# Patient Record
Sex: FEMALE | Race: BLACK OR AFRICAN AMERICAN | Employment: UNEMPLOYED | URBAN - METROPOLITAN AREA
[De-identification: names, ages, dates, MRNs, and addresses within clinical notes are randomized per-mention and may not be internally consistent; named-entity substitution may affect disease eponyms.]

---

## 2017-01-01 ENCOUNTER — ED HISTORICAL/CONVERTED ENCOUNTER (OUTPATIENT)
Dept: OTHER | Age: 32
End: 2017-01-01

## 2017-02-02 ENCOUNTER — HOSPITAL ENCOUNTER (EMERGENCY)
Age: 32
Discharge: HOME | End: 2017-02-02
Attending: EMERGENCY MEDICINE
Payer: MEDICAID

## 2017-02-02 VITALS
BODY MASS INDEX: 25.76 KG/M2 | TEMPERATURE: 98.7 F | DIASTOLIC BLOOD PRESSURE: 78 MMHG | HEIGHT: 68 IN | RESPIRATION RATE: 12 BRPM | OXYGEN SATURATION: 100 % | SYSTOLIC BLOOD PRESSURE: 127 MMHG | HEART RATE: 86 BPM | WEIGHT: 170 LBS

## 2017-02-02 DIAGNOSIS — S39.012A BACK STRAIN, INITIAL ENCOUNTER: Primary | ICD-10-CM

## 2017-02-02 PROCEDURE — 99282 EMERGENCY DEPT VISIT SF MDM: CPT

## 2017-02-02 RX ORDER — METHOCARBAMOL 500 MG/1
500 TABLET, FILM COATED ORAL 4 TIMES DAILY
Qty: 30 TAB | Refills: 0 | Status: SHIPPED | OUTPATIENT
Start: 2017-02-02 | End: 2017-06-25

## 2017-02-02 RX ORDER — TRAMADOL HYDROCHLORIDE AND ACETAMINOPHEN 37.5; 325 MG/1; MG/1
1 TABLET ORAL
Qty: 12 TAB | Refills: 0 | Status: SHIPPED | OUTPATIENT
Start: 2017-02-02 | End: 2017-06-25

## 2017-02-02 NOTE — ED NOTES
Patient given copy of discharge instructions and 2 script(s). Patient given a current medication reconciliation form and verbalized understanding of their medications and importance of discussing medications at follow-up. Patient stable at discharge. Ambulatory out of ED with self.

## 2017-02-02 NOTE — ED NOTES
Pt reports mid, lower back pain that began yesterday while pt was cleaning a bathroom, denies any urinary symptoms; denies any numbness or tingling down legs      Emergency Department Nursing Plan of Care       The Nursing Plan of Care is developed from the Nursing assessment and Emergency Department Attending provider initial evaluation. The plan of care may be reviewed in the ED Provider note.     The Plan of Care was developed with the following considerations:   Patient / Family readiness to learn indicated by:verbalized understanding  Persons(s) to be included in education: patient  Barriers to Learning/Limitations:No    Signed     Rupert Early RN    2/2/2017   2:59 PM

## 2017-02-02 NOTE — LETTER
[address] 
[facility] EMERGENCY DEPT 
[address] [place] [address] [address] [id number] [phone] Work/School Note Date: 2/2/2017 To Whom It May concern: 
 
Beka Camacho was seen and treated today in the emergency room by the following provider(s): 
Attending Provider: Radha Pascal MD 
Physician Assistant: Leander Pozo PA-C. Beka Camacho may return to work on 2/4/2017. Sincerely, Leander Pozo PA-C

## 2017-02-02 NOTE — ED PROVIDER NOTES
Patient is a 28 y.o. female presenting with back pain. Back Pain    This is a new (Pt reports bending over to scrub a ub at work yesterday and when she stood back up she had low back pain. Pain has continued today at work. Denies fever, chills, abd pain, n/v, urinary sxs.) problem. The current episode started yesterday. The problem has not changed since onset. The problem occurs constantly. Patient reports work related injury. The pain is associated with no known injury. The pain is present in the lower back. The quality of the pain is described as aching. The pain does not radiate. The pain is at a severity of 8/10. The symptoms are aggravated by bending, certain positions and twisting. The pain is worse during the day. Pertinent negatives include no chest pain, no fever, no numbness, no weight loss, no headaches, no abdominal pain, no abdominal swelling, no bowel incontinence, no perianal numbness, no bladder incontinence, no dysuria, no pelvic pain, no leg pain, no paresthesias, no paresis, no tingling and no weakness. Treatments tried: tylenol. The treatment provided mild relief.         Past Medical History:   Diagnosis Date    Asthma     Delivery normal     Depression     Ectopic pregnancy     Miscarriage     Other ill-defined conditions(199.89)      ectopic pregnancy    Psychiatric disorder      anger        Past Surgical History:   Procedure Laterality Date    Pr abdomen surgery proc unlisted      Hx cholecystectomy      Hx orthopaedic       Right arm/leg    Hx gyn       right oophetrecomy    Hx hernia repair  12/5/14     Open repair right inguinal hernia with mesh         Family History:   Problem Relation Age of Onset    Cancer Mother      breast    Hypertension Mother     Thyroid Disease Mother     Sickle Cell Trait Father     Asthma Sister     Asthma Brother     Asthma Sister     Asthma Sister     Asthma Sister     Asthma Sister     Asthma Sister     Asthma Brother     Asthma Brother     Asthma Brother     Asthma Brother     Sickle Cell Anemia Brother     Asthma Brother     Diabetes Paternal Grandmother        Social History     Social History    Marital status:      Spouse name: N/A    Number of children: N/A    Years of education: N/A     Occupational History    Not on file. Social History Main Topics    Smoking status: Former Smoker     Packs/day: 0.50     Types: Cigarettes     Quit date: 04/2016    Smokeless tobacco: Never Used      Comment: social smoker    Alcohol use Yes      Comment: occasionally    Drug use: No      Comment: last used March 1, 2015    Sexual activity: No     Other Topics Concern    Not on file     Social History Narrative         ALLERGIES: Ibuprofen    Review of Systems   Constitutional: Negative for activity change, appetite change, chills, fatigue, fever and weight loss. HENT: Negative. Eyes: Negative. Respiratory: Negative for cough and shortness of breath. Cardiovascular: Negative. Negative for chest pain. Gastrointestinal: Negative. Negative for abdominal pain, bowel incontinence, constipation, diarrhea, nausea and vomiting. Genitourinary: Negative. Negative for bladder incontinence, difficulty urinating, dysuria, flank pain, frequency and pelvic pain. Musculoskeletal: Positive for back pain and myalgias. Negative for arthralgias, gait problem, joint swelling, neck pain and neck stiffness. Skin: Negative. Negative for rash and wound. Neurological: Negative. Negative for tingling, weakness, numbness, headaches and paresthesias. Psychiatric/Behavioral: Negative. Vitals:    02/02/17 1439   BP: 127/78   Pulse: 86   Resp: 12   Temp: 98.7 °F (37.1 °C)   SpO2: 100%   Weight: 77.1 kg (170 lb)   Height: 5' 8\" (1.727 m)            Physical Exam   Constitutional: She is oriented to person, place, and time. She appears well-developed and well-nourished. No distress.    HENT:   Head: Normocephalic and atraumatic. Right Ear: External ear normal.   Left Ear: External ear normal.   Nose: Nose normal.   Mouth/Throat: Oropharynx is clear and moist. No oropharyngeal exudate. Eyes: Conjunctivae and EOM are normal. Pupils are equal, round, and reactive to light. Neck: Normal range of motion. Neck supple. Cardiovascular: Normal rate, regular rhythm, normal heart sounds and intact distal pulses. Pulmonary/Chest: Effort normal and breath sounds normal.   Abdominal: Soft. Bowel sounds are normal. There is no tenderness. Musculoskeletal: Normal range of motion. She exhibits tenderness. She exhibits no edema or deformity. Cervical back: Normal.        Thoracic back: Normal.        Lumbar back: She exhibits tenderness, bony tenderness and pain. She exhibits normal range of motion, no swelling, no edema, no deformity, no laceration, no spasm and normal pulse. Back:    Moderate paraspinal TTP. No midline spinal TTP. ROM limited due to pain. SLR (-) while seated. Neurological: She is alert and oriented to person, place, and time. No cranial nerve deficit. Skin: Skin is warm and dry. She is not diaphoretic. Psychiatric: She has a normal mood and affect. Her behavior is normal. Judgment and thought content normal.   Nursing note and vitals reviewed. MDM  Number of Diagnoses or Management Options  Back strain, initial encounter:   Diagnosis management comments: DDx: back strain, sprain, herniated disc, fx, dislocation (unlikely based on hx)    LABORATORY TESTS:  No results found for this or any previous visit (from the past 12 hour(s)). IMAGING RESULTS:  No orders to display    MEDICATIONS GIVEN:  Medications - No data to display    IMPRESSION:  Back strain, initial encounter  (primary encounter diagnosis)    PLAN:  1. Discharge Medication List as of 2/2/2017  3:15 PM    START taking these medications    methocarbamol (ROBAXIN) 500 mg tablet  Take 1 Tab by mouth four (4) times daily. , Print, Disp-30 Tab, R-0    traMADol-acetaminophen (ULTRACET) 37.5-325 mg per tablet  Take 1 Tab by mouth every six (6) hours as needed for Pain. Max Daily Amount: 4 Tabs., Print, Disp-12 Tab, R-0      CONTINUE these medications which have NOT CHANGED    albuterol (PROVENTIL HFA, VENTOLIN HFA) 90 mcg/actuation inhaler  Take 2 Puffs by inhalation every four (4) hours as needed for Wheezing., Print, Disp-1 Inhaler, R-0        2. Follow-up Information     Follow up With Details Comments Contact Info    Shelia Gan MD Schedule an appointment as soon as possible for a visit   in 1 week As needed, If symptoms worsen [address] [facility]  [phone]      [facility] Schedule an appointment as soon as possible for a visit in 1   week If symptoms worsen, As needed [place] [address] [place] [address] Markel Stoner  Schedule an appointment as soon as possible for a   visit in 1 week As needed, If symptoms worsen [address] [facility] [address] [phone]      Return to ED if worse               Patient Progress  Patient progress: stable    ED Course       Procedures    3:20 PM  I have discussed with patient their diagnosis, treatment, and follow up plan. The patient agrees to follow up as outlined in discharge paperwork and also to return to the ED with any worsening.  Diego Stein PA-C

## 2017-03-10 ENCOUNTER — HOSPITAL ENCOUNTER (EMERGENCY)
Age: 32
Discharge: HOME | End: 2017-03-10
Attending: EMERGENCY MEDICINE | Admitting: EMERGENCY MEDICINE
Payer: MEDICAID

## 2017-03-10 VITALS
BODY MASS INDEX: 25.82 KG/M2 | TEMPERATURE: 99.2 F | DIASTOLIC BLOOD PRESSURE: 82 MMHG | WEIGHT: 170.38 LBS | OXYGEN SATURATION: 100 % | HEIGHT: 68 IN | SYSTOLIC BLOOD PRESSURE: 124 MMHG | HEART RATE: 86 BPM | RESPIRATION RATE: 16 BRPM

## 2017-03-10 DIAGNOSIS — R51.9 ACUTE NONINTRACTABLE HEADACHE, UNSPECIFIED HEADACHE TYPE: ICD-10-CM

## 2017-03-10 DIAGNOSIS — H92.01 OTALGIA, RIGHT: ICD-10-CM

## 2017-03-10 DIAGNOSIS — J02.9 PHARYNGITIS, UNSPECIFIED ETIOLOGY: Primary | ICD-10-CM

## 2017-03-10 PROCEDURE — 99282 EMERGENCY DEPT VISIT SF MDM: CPT

## 2017-03-10 RX ORDER — AMOXICILLIN 875 MG/1
875 TABLET, FILM COATED ORAL 2 TIMES DAILY
Qty: 20 TAB | Refills: 0 | Status: SHIPPED | OUTPATIENT
Start: 2017-03-10 | End: 2017-03-20

## 2017-03-10 RX ORDER — BUTALBITAL, ACETAMINOPHEN AND CAFFEINE 300; 40; 50 MG/1; MG/1; MG/1
1 CAPSULE ORAL
Qty: 15 CAP | Refills: 0 | Status: SHIPPED | OUTPATIENT
Start: 2017-03-10 | End: 2017-06-25

## 2017-03-10 NOTE — ED PROVIDER NOTES
Patient is a 28 y.o. female presenting with sore throat. The history is provided by the patient. No  was used. Sore Throat    This is a new problem. The problem has not changed since onset. There has been no fever. Associated symptoms include ear pain and headaches. Pertinent negatives include no shortness of breath. She has had no exposure to strep. She has tried nothing for the symptoms. Past Medical History:   Diagnosis Date    Asthma     Delivery normal     Depression     Ectopic pregnancy     Miscarriage     Other ill-defined conditions(799.89)     ectopic pregnancy    Psychiatric disorder     anger        Past Surgical History:   Procedure Laterality Date    ABDOMEN SURGERY PROC UNLISTED      HX CHOLECYSTECTOMY      HX GYN      right oophetrecomy    HX HERNIA REPAIR  12/5/14    Open repair right inguinal hernia with mesh    HX ORTHOPAEDIC      Right arm/leg         Family History:   Problem Relation Age of Onset    Cancer Mother      breast    Hypertension Mother     Thyroid Disease Mother     Sickle Cell Trait Father     Asthma Sister     Asthma Brother     Asthma Sister     Asthma Sister     Asthma Sister     Asthma Sister     Asthma Sister     Asthma Brother     Asthma Brother     Asthma Brother     Asthma Brother     Sickle Cell Anemia Brother     Asthma Brother     Diabetes Paternal Grandmother        Social History     Social History    Marital status:      Spouse name: N/A    Number of children: N/A    Years of education: N/A     Occupational History    Not on file.      Social History Main Topics    Smoking status: Former Smoker     Packs/day: 0.50     Types: Cigarettes     Quit date: 04/2016    Smokeless tobacco: Never Used      Comment: social smoker    Alcohol use Yes      Comment: occasionally    Drug use: No      Comment: last used March 1, 2015    Sexual activity: No     Other Topics Concern    Not on file     Social History Narrative         ALLERGIES: Ibuprofen    Review of Systems   Constitutional: Negative for fatigue and fever. HENT: Positive for ear pain and sore throat. Respiratory: Negative for shortness of breath and wheezing. Cardiovascular: Negative for chest pain and palpitations. Gastrointestinal: Negative for abdominal pain. Musculoskeletal: Negative for arthralgias, myalgias, neck pain and neck stiffness. Skin: Negative for pallor and rash. Neurological: Positive for headaches. Negative for dizziness, tremors and weakness. Hematological: Negative for adenopathy. Psychiatric/Behavioral: Negative for agitation and behavioral problems. All other systems reviewed and are negative. Vitals:    03/10/17 1734   BP: 124/82   Pulse: 86   Resp: 16   Temp: 99.2 °F (37.3 °C)   SpO2: 100%   Weight: 77.3 kg (170 lb 6 oz)   Height: 5' 8\" (1.727 m)            Physical Exam   Constitutional: She is oriented to person, place, and time. She appears well-developed and well-nourished. No distress. HENT:   Head: Normocephalic and atraumatic. Nose: Nose normal.   Mouth/Throat: No oropharyngeal exudate. Posterior oropharynx erythema R TM retracted   Eyes: Conjunctivae and EOM are normal. Right eye exhibits no discharge. Left eye exhibits no discharge. Neck: Normal range of motion. Neck supple. No tracheal deviation present. Cardiovascular: Normal rate, regular rhythm and normal heart sounds. No murmur heard. Pulmonary/Chest: Effort normal and breath sounds normal. No respiratory distress. She has no wheezes. She exhibits no tenderness. Abdominal: Soft. Bowel sounds are normal. She exhibits no distension. There is no tenderness. There is no rebound. Musculoskeletal: Normal range of motion. She exhibits no edema or tenderness. Lymphadenopathy:     She has no cervical adenopathy. Neurological: She is alert and oriented to person, place, and time. No cranial nerve deficit.    Skin: Skin is warm and dry. No rash noted. No erythema. Psychiatric: She has a normal mood and affect. Her behavior is normal. Judgment and thought content normal.   Nursing note and vitals reviewed. MDM  Number of Diagnoses or Management Options  Diagnosis management comments: DDX pharyngitis otitis       Amount and/or Complexity of Data Reviewed  Discuss the patient with other providers: yes      ED Course       Procedures      IMPRESSION  Headache  Pharyngitis  Otitis  PLAN  Amoxicillin  fiorcet  Follow up PCP  6:21 PM  I have discussed with patient their diagnosis, treatment, and follow up plan. The patient agrees to follow up as outlined in discharge paperwork and also to return to the ED with any worsening.  Raimundo Vasquez NP

## 2017-03-10 NOTE — ED NOTES
Patient (s)  given copy of dc instructions and 2 paper script(s) and  electronic scripts. Patient (s)  verbalized understanding of instructions and script (s). Patient given a current medication reconciliation form and verbalized understanding of their medications. Patient (s) verbalized understanding of the importance of discussing medications with his or her physician or clinic they will be following up with. Patient alert and oriented and in no acute distress. Patient offered wheelchair from treatment area to hospital entrance, patient decline wheelchair.

## 2017-03-10 NOTE — DISCHARGE INSTRUCTIONS
Earache: Care Instructions  Your Care Instructions  Even though infection is a common cause of ear pain, not all ear pain means an infection. If you have ear pain and don't have an infection, it could be because of a jaw problem, such as temporomandibular joint (TMJ) pain. Or it could be because of a neck problem. When ear discomfort or pain is mild or comes and goes without other symptoms, home treatment may be all you need. Follow-up care is a key part of your treatment and safety. Be sure to make and go to all appointments, and call your doctor if you are having problems. It's also a good idea to know your test results and keep a list of the medicines you take. How can you care for yourself at home? · Apply heat on the ear to ease pain. To apply heat, put a warm water bottle, a heating pad set on low, or a warm cloth on your ear. Do not go to sleep with a heating pad on your skin. · Take an over-the-counter pain medicine, such as acetaminophen (Tylenol), ibuprofen (Advil, Motrin), or naproxen (Aleve). Be safe with medicines. Read and follow all instructions on the label. · Do not take two or more pain medicines at the same time unless the doctor told you to. Many pain medicines have acetaminophen, which is Tylenol. Too much acetaminophen (Tylenol) can be harmful. · Never insert anything, such as a cotton swab or a abdiel pin, into the ear. When should you call for help? Call your doctor now or seek immediate medical care if:  · You have a fever. · You feel a lump in your neck or jaw. · The area around the ear starts to swell. · There is pus or blood draining from the ear. · There is new or different drainage from the ear. Watch closely for changes in your health, and be sure to contact your doctor if:  · Your pain gets worse. · You do not get better as expected. Where can you learn more? Go to http://bethany-nia.info/.   Enter X896 in the search box to learn more about \"Earache: Care Instructions. \"  Current as of: July 29, 2016  Content Version: 11.1  © 0235-2511 [employer]. Care instructions adapted under license by [employer] (which disclaims liability or warranty for this information). If you have questions about a medical condition or this instruction, always ask your healthcare professional. [place] [address] any warranty or liability for your use of this information.

## 2017-04-17 ENCOUNTER — HOSPITAL ENCOUNTER (EMERGENCY)
Age: 32
Discharge: HOME | End: 2017-04-17
Attending: EMERGENCY MEDICINE | Admitting: EMERGENCY MEDICINE
Payer: MEDICAID

## 2017-04-17 VITALS
DIASTOLIC BLOOD PRESSURE: 83 MMHG | OXYGEN SATURATION: 98 % | TEMPERATURE: 98.4 F | HEIGHT: 68 IN | HEART RATE: 89 BPM | BODY MASS INDEX: 25.76 KG/M2 | RESPIRATION RATE: 16 BRPM | SYSTOLIC BLOOD PRESSURE: 123 MMHG | WEIGHT: 170 LBS

## 2017-04-17 DIAGNOSIS — F43.10 PTSD (POST-TRAUMATIC STRESS DISORDER): Primary | ICD-10-CM

## 2017-04-17 LAB
ALBUMIN SERPL BCP-MCNC: 3.8 G/DL (ref 3.5–5)
ALBUMIN/GLOB SERPL: 1.1 {RATIO} (ref 1.1–2.2)
ALP SERPL-CCNC: 76 U/L (ref 45–117)
ALT SERPL-CCNC: 17 U/L (ref 12–78)
AMPHET UR QL SCN: NEGATIVE
ANION GAP BLD CALC-SCNC: 10 MMOL/L (ref 5–15)
APPEARANCE UR: CLEAR
AST SERPL W P-5'-P-CCNC: 15 U/L (ref 15–37)
BACTERIA URNS QL MICRO: NEGATIVE /HPF
BARBITURATES UR QL SCN: POSITIVE
BASOPHILS # BLD AUTO: 0 K/UL (ref 0–0.1)
BASOPHILS # BLD: 0 % (ref 0–1)
BENZODIAZ UR QL: NEGATIVE
BILIRUB SERPL-MCNC: 0.3 MG/DL (ref 0.2–1)
BILIRUB UR QL: NEGATIVE
BUN SERPL-MCNC: 16 MG/DL (ref 6–20)
BUN/CREAT SERPL: 21 (ref 12–20)
CALCIUM SERPL-MCNC: 8.6 MG/DL (ref 8.5–10.1)
CANNABINOIDS UR QL SCN: NEGATIVE
CHLORIDE SERPL-SCNC: 106 MMOL/L (ref 97–108)
CO2 SERPL-SCNC: 27 MMOL/L (ref 21–32)
COCAINE UR QL SCN: NEGATIVE
COLOR UR: ABNORMAL
CREAT SERPL-MCNC: 0.78 MG/DL (ref 0.55–1.02)
DRUG SCRN COMMENT,DRGCM: ABNORMAL
EOSINOPHIL # BLD: 0.3 K/UL (ref 0–0.4)
EOSINOPHIL NFR BLD: 4 % (ref 0–7)
EPITH CASTS URNS QL MICRO: ABNORMAL /LPF
ERYTHROCYTE [DISTWIDTH] IN BLOOD BY AUTOMATED COUNT: 14.3 % (ref 11.5–14.5)
ETHANOL SERPL-MCNC: <10 MG/DL
GLOBULIN SER CALC-MCNC: 3.6 G/DL (ref 2–4)
GLUCOSE SERPL-MCNC: 93 MG/DL (ref 65–100)
GLUCOSE UR STRIP.AUTO-MCNC: NEGATIVE MG/DL
HCG UR QL: NEGATIVE
HCT VFR BLD AUTO: 36.9 % (ref 35–47)
HGB BLD-MCNC: 12 G/DL (ref 11.5–16)
HGB UR QL STRIP: ABNORMAL
KETONES UR QL STRIP.AUTO: NEGATIVE MG/DL
LEUKOCYTE ESTERASE UR QL STRIP.AUTO: NEGATIVE
LYMPHOCYTES # BLD AUTO: 37 % (ref 12–49)
LYMPHOCYTES # BLD: 2.7 K/UL (ref 0.8–3.5)
MCH RBC QN AUTO: 27.5 PG (ref 26–34)
MCHC RBC AUTO-ENTMCNC: 32.5 G/DL (ref 30–36.5)
MCV RBC AUTO: 84.4 FL (ref 80–99)
METHADONE UR QL: NEGATIVE
MONOCYTES # BLD: 0.7 K/UL (ref 0–1)
MONOCYTES NFR BLD AUTO: 9 % (ref 5–13)
NEUTS SEG # BLD: 3.6 K/UL (ref 1.8–8)
NEUTS SEG NFR BLD AUTO: 50 % (ref 32–75)
NITRITE UR QL STRIP.AUTO: NEGATIVE
OPIATES UR QL: NEGATIVE
PCP UR QL: NEGATIVE
PH UR STRIP: 6 [PH] (ref 5–8)
PLATELET # BLD AUTO: 263 K/UL (ref 150–400)
POTASSIUM SERPL-SCNC: 3.6 MMOL/L (ref 3.5–5.1)
PROT SERPL-MCNC: 7.4 G/DL (ref 6.4–8.2)
PROT UR STRIP-MCNC: NEGATIVE MG/DL
RBC # BLD AUTO: 4.37 M/UL (ref 3.8–5.2)
RBC #/AREA URNS HPF: ABNORMAL /HPF (ref 0–5)
SODIUM SERPL-SCNC: 143 MMOL/L (ref 136–145)
SP GR UR REFRACTOMETRY: 1.02 (ref 1–1.03)
UA: UC IF INDICATED,UAUC: ABNORMAL
UROBILINOGEN UR QL STRIP.AUTO: 1 EU/DL (ref 0.2–1)
WBC # BLD AUTO: 7.3 K/UL (ref 3.6–11)
WBC URNS QL MICRO: ABNORMAL /HPF (ref 0–4)

## 2017-04-17 PROCEDURE — 80307 DRUG TEST PRSMV CHEM ANLYZR: CPT | Performed by: EMERGENCY MEDICINE

## 2017-04-17 PROCEDURE — 99284 EMERGENCY DEPT VISIT MOD MDM: CPT

## 2017-04-17 PROCEDURE — 81025 URINE PREGNANCY TEST: CPT

## 2017-04-17 PROCEDURE — 36415 COLL VENOUS BLD VENIPUNCTURE: CPT | Performed by: EMERGENCY MEDICINE

## 2017-04-17 PROCEDURE — 80053 COMPREHEN METABOLIC PANEL: CPT | Performed by: EMERGENCY MEDICINE

## 2017-04-17 PROCEDURE — 90791 PSYCH DIAGNOSTIC EVALUATION: CPT

## 2017-04-17 PROCEDURE — 85025 COMPLETE CBC W/AUTO DIFF WBC: CPT | Performed by: EMERGENCY MEDICINE

## 2017-04-17 PROCEDURE — 81001 URINALYSIS AUTO W/SCOPE: CPT | Performed by: EMERGENCY MEDICINE

## 2017-04-17 NOTE — DISCHARGE INSTRUCTIONS
Post-Traumatic Stress Disorder (PTSD): Care Instructions  Your Care Instructions  Post-traumatic stress disorder (PTSD) is a mental condition that can result from being in or seeing a traumatic or terrifying event. These events can include combat, a terrorist attack, a natural disaster, a serious accident, an assault, or a rape. If you have PTSD, you may often relive the experience in nightmares or flashbacks. These are clear and frightening memories of the event. You may also have trouble sleeping. PTSD affects people in very different ways. It can interfere with daily activities such as work or school, and it can make you withdraw from friends or loved ones. Follow-up care is a key part of your treatment and safety. Be sure to make and go to all appointments, and call your doctor if you are having problems. It's also a good idea to know your test results and keep a list of the medicines you take. How can you care for yourself at home? · Take medicines exactly as directed. Call your doctor if you think you are having a problem with your medicine. · Go to your counseling sessions and follow-up appointments. · Recognize and accept your anxiety. Then, when you are in a situation that makes you anxious, say to yourself, \"This is not an emergency. I feel uncomfortable, but I am not in danger. I can keep going even if I feel anxious. \"  · Be kind to your body:  ¨ Relieve tension with exercise or a massage. ¨ Get enough rest.  ¨ Avoid alcohol, caffeine, nicotine, and illegal drugs. They can increase your anxiety level and cause sleep problems. ¨ Learn and do relaxation techniques. See below for more about these techniques. · Engage your mind. Get out and do something you enjoy. Go to a funny movie, or take a walk or hike. Plan your day. Having too much or too little to do can make you anxious. · Keep a record of your symptoms.  Discuss your fears with a good friend or family member, or join a support group for people with similar problems. Talking to others sometimes relieves stress. · Get involved in social groups, or volunteer to help others. Being alone sometimes makes things seem worse than they are. · Get at least 30 minutes of exercise on most days of the week. Walking is a good choice. You also may want to do other activities, such as running, swimming, cycling, or playing tennis or team sports. · Keep the numbers for these national suicide hotlines: 4-225-888-TALK (0-526.665.8891) and 2-861-QTTXPWI (1-429.759.5474). If you or someone you know talks about suicide or feeling hopeless, get help right away. Relaxation techniques  Do relaxation exercises 10 to 20 minutes a day. You can play soothing, relaxing music while you do them, if you wish. · Tell others in your house that you are going to do your relaxation exercises. Ask them not to disturb you. · Find a comfortable place, away from all distractions and noise. · Lie down on your back, or sit with your back straight. · Focus on your breathing. Make it slow and steady. · Breathe in through your nose. Breathe out through either your nose or mouth. · Breathe deeply, filling up the area between your navel and your rib cage. Breathe so that your belly goes up and down. · Do not hold your breath. · Breathe like this for 5 to 10 minutes. Notice the feeling of calmness throughout your whole body. As you continue to breathe slowly and deeply, relax by doing the following for another 5 to 10 minutes:  · Tighten and relax each muscle group in your body. You can begin at your toes and work your way up to your head. · Imagine your muscle groups relaxing and becoming heavy. · Empty your mind of all thoughts. · Let yourself relax more and more deeply. · Become aware of the state of calmness that surrounds you.   · When your relaxation time is over, you can bring yourself back to alertness by moving your fingers and toes and then your hands and feet and then stretching and moving your entire body. Sometimes people fall asleep during relaxation, but they usually wake up shortly afterward. · Always give yourself time to return to full alertness before you drive a car or do anything that might cause an accident if you are not fully alert. Never play a relaxation tape while you drive a car. When should you call for help? Call 911 anytime you think you may need emergency care. For example, call if:  · You feel you cannot stop from hurting yourself or someone else. Watch closely for changes in your health, and be sure to contact your doctor if:  · Your PTSD symptoms are getting worse. · You have new or worsening symptoms of anxiety. · You are not getting better as expected. Where can you learn more? Go to http://bethany-nia.info/. [place] G in the search box to learn more about \"Post-Traumatic Stress Disorder (PTSD): Care Instructions. \"  Current as of: July 26, 2016  Content Version: 11.2  © 6780-7192 [employer]. Care instructions adapted under license by [employer] (which disclaims liability or warranty for this information). If you have questions about a medical condition or this instruction, always ask your healthcare professional. [place] [address] any warranty or liability for your use of this information. Grief (Actual/Anticipated): Care Instructions  Your Care Instructions  Grief is your emotional reaction to a major loss. The words \"sorrow\" and \"heartache\" often are used to describe feelings of grief. You feel grief when you lose a beloved person, pet, place, or thing. It is also natural to feel grief when you lose a valued way of life, such as a job, marriage, or good health. You may begin to grieve before a loss occurs. You may grieve for a loved one who is sick and dying. Children and adults often feel the pain of loss before a big move or divorce.  This type of grief helps you get ready for a loss. Grief is different for each person. There is no \"normal\" or \"expected\" period of time for grieving. Some people adjust to their loss within a couple of months. Others may take 2 years or longer, especially if their lives were changed a lot or if the loss was sudden and shocking. Grieving can cause problems such as headaches, loss of appetite, and trouble with thinking or sleeping. You may withdraw from friends and family and behave in ways that are unusual for you. Grief may cause you to question your beliefs and views about life. Grief is natural and does not require medical treatment. But if you have trouble sleeping, it may help to take sleeping pills for a short time. It may help to talk with people who have been through or are going through similar losses. You may also want to talk to a counselor about your feelings. Talking about your loss, sharing your cares and concerns, and getting support from others are important parts of healthy grieving. Follow-up care is a key part of your treatment and safety. Be sure to make and go to all appointments, and call your doctor if you are having problems. Its also a good idea to know your test results and keep a list of the medicines you take. How can you care for yourself at home? · Get enough sleep. Your mind helps make sense of your life while you sleep. Missing sleep can lead to illness and make it harder for you to deal with your grief. · Eat healthy foods. Try to avoid eating only foods that give you comfort. Ask someone to join you for a meal if you do not like eating alone. Consider taking a multivitamin every day. · Get some exercise every day. Even a walk can help you deal with your grief. Other exercises, such as yoga, can also help you manage stress. · Comfort yourself. Take time to look at photos or use special items that make you feel better. · Stay involved in your life. Do not withdraw from the activities you enjoy.  People you know at work, [employer], clubs, or other groups can help you get through your period of grief. · Think about joining a support group to help you deal with your grief. There are many support groups to help people recover from grief. When should you call for help? Be sure to contact your doctor if:  · You feel that life is meaningless, or you think about killing yourself. · A grieving person you know talks about hurting himself or herself. · You have any of the following problems that last for 2 or more weeks:  ¨ You feel sad a lot or cry all the time. ¨ You have trouble sleeping, or you sleep too much. ¨ You find it hard to concentrate, make decisions, or remember things. ¨ You change how you normally eat. ¨ You feel guilty about the death or loss you have suffered. ¨ You are using alcohol or drugs to help you cope with your loss. Where can you learn more? Go to http://bethany-nia.info/. Enter H249 in the search box to learn more about \"Grief (Actual/Anticipated): Care Instructions. \"  Current as of: February 24, 2016  Content Version: 11.2  © 8408-6220 [employer]. Care instructions adapted under license by [employer] (which disclaims liability or warranty for this information). If you have questions about a medical condition or this instruction, always ask your healthcare professional. [address] any warranty or liability for your use of this information.

## 2017-04-17 NOTE — ED PROVIDER NOTES
HPI Comments: Darren Martinez is a 28 y.o. female with hx depression, bipolar disorder, who presents ambulatory to the ED c/o worsening depression, anxiety x few weeks. She reports intermittent life stressors over the past year, and states she recently lost 2 family members. She believes she has PTSD form the loss of her family members. She has had previous hospital admissions for psychiatric conditions, and has not taken any medication for 1 year. She is currently on her menstrual cycle. She has a hx of migraines and currently admits to HA. She specifically denies any SI, fevers, chills, nausea, vomiting, chest pain, shortness of breath, rash, diarrhea, sweating or weight loss. PCP: Kailey Cantrell MD  Soc Hx: -tobacco (former), +EtOH    There are no other complaints, changes or physical findings at this time. The history is provided by the patient.         Past Medical History:   Diagnosis Date    Asthma     Delivery normal     Depression     Ectopic pregnancy     Miscarriage     Other ill-defined conditions(799.89)     ectopic pregnancy    Psychiatric disorder     anger        Past Surgical History:   Procedure Laterality Date    ABDOMEN SURGERY PROC UNLISTED      HX CHOLECYSTECTOMY      HX GYN      right oophetrecomy    HX HERNIA REPAIR  12/5/14    Open repair right inguinal hernia with mesh    HX ORTHOPAEDIC      Right arm/leg         Family History:   Problem Relation Age of Onset    Cancer Mother      breast    Hypertension Mother     Thyroid Disease Mother     Sickle Cell Trait Father     Asthma Sister     Asthma Brother     Asthma Sister     Asthma Sister    Tilman Stakes Asthma Sister    Til[place] Stakes Asthma Sister     Asthma Sister     Asthma Brother     Asthma Brother     Asthma Brother     Asthma Brother     Sickle Cell Anemia Brother     Asthma Brother     Diabetes Paternal Grandmother        Social History     Social History    Marital status:      Spouse name: N/A    Number of children: N/A    Years of education: N/A     Occupational History    Not on file. Social History Main Topics    Smoking status: Former Smoker     Packs/day: 0.50     Types: Cigarettes     Quit date: 04/2016    Smokeless tobacco: Never Used      Comment: social smoker    Alcohol use Yes      Comment: occasionally    Drug use: No      Comment: last used March 1, 2015    Sexual activity: No     Other Topics Concern    Not on file     Social History Narrative         ALLERGIES: Ibuprofen    Review of Systems   Constitutional: Negative. Negative for activity change, appetite change, chills, fatigue, fever and unexpected weight change. HENT: Negative for congestion, hearing loss, rhinorrhea, sneezing and voice change. Eyes: Negative. Negative for pain and visual disturbance. Respiratory: Negative. Negative for apnea, cough, choking, chest tightness and shortness of breath. Cardiovascular: Negative. Negative for chest pain and palpitations. Gastrointestinal: Negative. Negative for abdominal distention, abdominal pain, blood in stool, diarrhea, nausea and vomiting. Genitourinary: Negative. Negative for difficulty urinating, flank pain, frequency and urgency. No discharge   Musculoskeletal: Negative. Negative for arthralgias, back pain, myalgias and neck stiffness. Skin: Negative. Negative for color change and rash. Neurological: Positive for headaches. Negative for dizziness, seizures, syncope, speech difficulty, weakness and numbness. Hematological: Negative for adenopathy. Psychiatric/Behavioral: Positive for dysphoric mood. Negative for agitation, behavioral problems and suicidal ideas. The patient is nervous/anxious. All other systems reviewed and are negative.       Vitals:    04/17/17 1141   BP: 123/83   Pulse: 89   Resp: 16   Temp: 98.4 °F (36.9 °C)   SpO2: 98%   Weight: 77.1 kg (170 lb)   Height: 5' 8\" (1.727 m)            Physical Exam     Physical Examination: General appearance - WDWN, in no apparent distress  Head - NC/AT  Eyes - pupils equal, round  and reactive, extraocular eye movements intact, conj/sclera clear, anicteric  Mouth - mucous membranes moist, pharynx normal without lesions  Nose/Ears - nares clear, Tms & canals clear  Neck - supple, no significant adenopathy, trachea midline, no crepitus, c spine diffusely non-tender, no step offs  Chest - Normal respiratory effort, clear to auscultation bilaterally, no wheezes/rales/rhonchi  Heart - normal rate and regular rhythm, S1 and S2 normal, no murmurs, gallops, or rubs  Abdomen - soft, nontender, nondistended, nabs, no masses, guarding, rebound or rigidity  Neurological - alert, oriented, normal speech, cranial nerves intact, no focal motor findings, motor & sensory diffusely intact, normal gait  Extremities/MS - peripheral pulses normal, no pedal edema, all joints atraumatic, FROM, non-tender, no gross deformities, spine diffusely non-tender  Skin - normal coloration and turgor, no rashes, no lesions or lacerations    MDM  Number of Diagnoses or Management Options  Diagnosis management comments: PTSD, depression, anxiety, stress       Amount and/or Complexity of Data Reviewed  Clinical lab tests: ordered and reviewed  Review and summarize past medical records: yes  Discuss the patient with other providers: yes [facility])      ED Course       Procedures    LABORATORY TESTS:  Recent Results (from the past 12 hour(s))   URINALYSIS W/ REFLEX CULTURE    Collection Time: 04/17/17 12:14 PM   Result Value Ref Range    Color YELLOW/STRAW      Appearance CLEAR CLEAR      Specific gravity 1.020 1.003 - 1.030      pH (UA) 6.0 5.0 - 8.0      Protein NEGATIVE  NEG mg/dL    Glucose NEGATIVE  NEG mg/dL    Ketone NEGATIVE  NEG mg/dL    Bilirubin NEGATIVE  NEG      Blood TRACE (A) NEG      Urobilinogen 1.0 0.2 - 1.0 EU/dL    Nitrites NEGATIVE  NEG      Leukocyte Esterase NEGATIVE  NEG      WBC 0-4 0 - 4 /hpf    RBC 0-5 0 - 5 /hpf Epithelial cells FEW FEW /lpf    Bacteria NEGATIVE  NEG /hpf    UA:UC IF INDICATED CULTURE NOT INDICATED BY UA RESULT CNI     DRUG SCREEN, URINE    Collection Time: 04/17/17 12:14 PM   Result Value Ref Range    AMPHETAMINE NEGATIVE  NEG      BARBITURATES POSITIVE (A) NEG      BENZODIAZEPINE NEGATIVE  NEG      COCAINE NEGATIVE  NEG      METHADONE NEGATIVE  NEG      OPIATES NEGATIVE  NEG      PCP(PHENCYCLIDINE) NEGATIVE  NEG      THC (TH-CANNABINOL) NEGATIVE  NEG      Drug screen comment (NOTE)    HCG URINE, QL. - POC    Collection Time: 04/17/17 12:15 PM   Result Value Ref Range    Pregnancy test,urine (POC) NEGATIVE  NEG     CBC WITH AUTOMATED DIFF    Collection Time: 04/17/17 12:36 PM   Result Value Ref Range    WBC 7.3 3.6 - 11.0 K/uL    RBC 4.37 3.80 - 5.20 M/uL    HGB 12.0 11.5 - 16.0 g/dL    HCT 36.9 35.0 - 47.0 %    MCV 84.4 80.0 - 99.0 FL    MCH 27.5 26.0 - 34.0 PG    MCHC 32.5 30.0 - 36.5 g/dL    RDW 14.3 11.5 - 14.5 %    PLATELET 126 162 - 163 K/uL    NEUTROPHILS 50 32 - 75 %    LYMPHOCYTES 37 12 - 49 %    MONOCYTES 9 5 - 13 %    EOSINOPHILS 4 0 - 7 %    BASOPHILS 0 0 - 1 %    ABS. NEUTROPHILS 3.6 1.8 - 8.0 K/UL    ABS. LYMPHOCYTES 2.7 0.8 - 3.5 K/UL    ABS. MONOCYTES 0.7 0.0 - 1.0 K/UL    ABS. EOSINOPHILS 0.3 0.0 - 0.4 K/UL    ABS. BASOPHILS 0.0 0.0 - 0.1 K/UL   METABOLIC PANEL, COMPREHENSIVE    Collection Time: 04/17/17 12:36 PM   Result Value Ref Range    Sodium 143 136 - 145 mmol/L    Potassium 3.6 3.5 - 5.1 mmol/L    Chloride 106 97 - 108 mmol/L    CO2 27 21 - 32 mmol/L    Anion gap 10 5 - 15 mmol/L    Glucose 93 65 - 100 mg/dL    BUN 16 6 - 20 MG/DL    Creatinine 0.78 0.55 - 1.02 MG/DL    BUN/Creatinine ratio 21 (H) 12 - 20      GFR est AA >60 >60 ml/min/1.73m2    GFR est non-AA >60 >60 ml/min/1.73m2    Calcium 8.6 8.5 - 10.1 MG/DL    Bilirubin, total 0.3 0.2 - 1.0 MG/DL    ALT (SGPT) 17 12 - 78 U/L    AST (SGOT) 15 15 - 37 U/L    Alk.  phosphatase 76 45 - 117 U/L    Protein, total 7.4 6.4 - 8.2 g/dL    Albumin 3.8 3.5 - 5.0 g/dL    Globulin 3.6 2.0 - 4.0 g/dL    A-G Ratio 1.1 1.1 - 2.2     ETHYL ALCOHOL    Collection Time: 04/17/17 12:36 PM   Result Value Ref Range    ALCOHOL(ETHYL),SERUM <10 <10 MG/DL       IMPRESSION:  1. PTSD (post-traumatic stress disorder)        PLAN:  1. Discharge Medication List as of 4/17/2017  1:36 PM        2. Follow-up Information     Follow up With Details Comments [address], [place] Schedule an appointment as soon as possible for a visit in 1 day  [address] [facility]  [address] [facility]   [facility]  [phone]        Return to ED if worse     DISCHARGE NOTE  1:50 PM  The patient has been re-evaluated and is ready for discharge. Reviewed available results, diagnosis, and discharge instructions with patient. Pt has conveyed understanding and agreement with the diagnosis and plan. Pt agrees to F/U as recommended, or return to the ED if their sxs worsen. Written by Nany La ED Scribe, as dictated by Shellie Garcia MD.    This note is prepared by Nany La acting as Scribe for Shellie Thomson. Roland Garcia, [address] [place] [address]. Roland Garcia MD: The scribe's documentation has been prepared under my direction and personally reviewed by me in its entirety. I confirm that the note above accurately reflects all work, treatment, procedures, and medical decision making performed by me.

## 2017-04-17 NOTE — ED NOTES
Patient here with depression. Patient reports hx of loss of three children, patient states that she has had recent loss of babies with other family members that have bought previous emotions \"cycled back through\". Patient denies suicidal thoughts however reports worsening depression.

## 2017-04-17 NOTE — BSMART NOTE
Comprehensive Assessment Form Part 1      Section I - Disposition    Axis I - Anxiety Disorder NOS   PTSD vs Depression Disorder   Axis II - Deferred  Axis III -   Past Medical History:   Diagnosis Date    Asthma     Delivery normal     Depression     Ectopic pregnancy     Miscarriage     Other ill-defined conditions     ectopic pregnancy    Psychiatric disorder     anger    Axis IV - lack of structure, grief/recent loss  [place] - 56      The Medical Doctor to Psychiatrist conference was not completed. The Medical Doctor is in agreement with Psychiatrist disposition because of (reason) patient does not warrant inpatient care. The plan is discharge with resources and follow up with PCP and [facility] outpatient clinic. The on-call Psychiatrist consulted was Dr. Linnea Medina. The admitting Psychiatrist will be Dr. Shelley Carson. The admitting Diagnosis is N/A. The Payor source is [facility]. Section II - Integrated Summary  Summary:  Patient is a 33yo female who presents to ER with bogdan for a mental health problem. She reports increased anxiety and depression due to recent deaths. She reports that she lost 3 children years ago but that her sister has had pregnancy complications/stillbirths that she has been present for and this has caused her to have symptoms of sleeplessness, increased irritability, agitation, labile mood, and lack of focus. Patient is hoping to receive medication and/or referrals for a psychiatrist as she has not had care recently and thinks this would be beneficial. Patient recently saw Dr. Casey June but he lost his license and she has not had recent care. She reports last medication was 1 year ago and she was diagnosed with Bipolar disorder. Patient given resources for local psychiatrists, counselors, trauma therapists, and grief support groups. Bogdan reports he will be home and patient denies suicidal or homicidal ideation.  Reports she would never hurt herself or others due to having 3 children at home that she loves and wouldn't want to hurt them by hurting herself. The patienthas demonstrated mental capacity to provide informed consent. The information is given by the patient and spouse/SO. The Chief Complaint is mental health problem. The Precipitant Factors are recent losses triggering thoughts of the death of 3 of her children. Previous Hospitalizations: Yes  The patient has not previously been in restraints. Current Psychiatrist and/or  is N/A. Lethality Assessment:    The potential for suicide noted by the following: not noted . The potential for homicide is not noted. The patient has not been a perpetrator of sexual or physical abuse. There are not pending charges. The patient is not felt to be at risk for self harm or harm to others. The attending nurse was advised that security has not been notified. Section III - Psychosocial  The patient's overall mood and attitude is anxious and irritable. Feelings of helplessness and hopelessness are observed by verbal report. Generalized anxiety is not observed. Panic is not observed. Phobias are not observed. Obsessive compulsive tendencies are not observed. Section IV - Mental Status Exam  The patient's appearance shows no evidence of impairment. The patient's behavior shows poor eye contact and is restless. The patient is oriented to time, place, person and situation. The patient's speech shows no evidence of impairment. The patient's mood is anxious and is irritable. The range of affect is constricted. The patient's thought content demonstrates no evidence of impairment. The thought process shows no evidence of impairment. The patient's perception shows no evidence of impairment. The patient's memory shows no evidence of impairment. The patient's appetite shows no evidence of impairment. The patient's sleep shows no evidence of impairment. The patient's insight shows no evidence of impairment.   The patient's judgement shows no evidence of impairment. Section V - Substance Abuse  The patient is not using substances. Section VI - Living Arrangements  The patient has a significant other/fiance. The patient lives with a significant other and with a child/children. The patient has 3 children ages 12, 15, 3. The patient does plan to return home upon discharge. The patient does not have legal issues pending. The patient's source of income comes from employment. Mosque and cultural practices have not been voiced at this time. The patient's greatest support comes from [facility] and this person will be involved with the treatment. The patient has been in an event described as horrible or outside the realm of ordinary life experience either currently or in the past.  The patient has not been a victim of sexual/physical abuse. Section VII - Other Areas of Clinical Concern  The highest grade achieved is not assessed with the overall quality of school experience being described as not assessed. The patient is currently employed and speaks [place] as a primary language. The patient has no communication impairments affecting communication. The patient's preference for learning can be described as: can read and write adequately.   The patient's hearing is normal.  The patient's vision is normal.      Sherlyn Palacio MA [facility] Resident in Counseling

## 2017-04-17 NOTE — ED NOTES
Patient given copy of dc instructions and 0 paper script(s) and 0 electronic scripts. Patient  verbalized understanding of instructions and script (s). Patient given a current medication reconciliation form and verbalized understanding of their medications. Patient  verbalized understanding of the importance of discussing medications with  his or her physician or clinic they will be following up with. Patient alert and oriented and in no acute distress. Patient offered wheelchair from treatment area to hospital entrance, patient declines wheelchair. BSMART at bedside just prior to leaving.

## 2017-04-17 NOTE — ED TRIAGE NOTES
Pt c/o worsening depression, recently lost 2 family members in the past few weeks, c/o HI but denies SI (no specific plan of specific person), PMH of bipolar, been off medication for around 1 year

## 2017-05-15 ENCOUNTER — HOSPITAL ENCOUNTER (EMERGENCY)
Age: 32
Discharge: HOME | End: 2017-05-15
Attending: EMERGENCY MEDICINE
Payer: MEDICAID

## 2017-05-15 VITALS
DIASTOLIC BLOOD PRESSURE: 95 MMHG | TEMPERATURE: 98.7 F | SYSTOLIC BLOOD PRESSURE: 142 MMHG | BODY MASS INDEX: 25.56 KG/M2 | HEIGHT: 68 IN | RESPIRATION RATE: 16 BRPM | OXYGEN SATURATION: 100 % | HEART RATE: 91 BPM | WEIGHT: 168.65 LBS

## 2017-05-15 DIAGNOSIS — N30.00 ACUTE CYSTITIS WITHOUT HEMATURIA: Primary | ICD-10-CM

## 2017-05-15 LAB
ALBUMIN SERPL BCP-MCNC: 4 G/DL (ref 3.5–5)
ALBUMIN/GLOB SERPL: 1 {RATIO} (ref 1.1–2.2)
ALP SERPL-CCNC: 81 U/L (ref 45–117)
ALT SERPL-CCNC: 20 U/L (ref 12–78)
ANION GAP BLD CALC-SCNC: 7 MMOL/L (ref 5–15)
APPEARANCE UR: CLEAR
AST SERPL W P-5'-P-CCNC: 10 U/L (ref 15–37)
BACTERIA URNS QL MICRO: ABNORMAL /HPF
BASOPHILS # BLD AUTO: 0.1 K/UL (ref 0–0.1)
BASOPHILS # BLD: 1 % (ref 0–1)
BILIRUB SERPL-MCNC: 0.3 MG/DL (ref 0.2–1)
BILIRUB UR QL: NEGATIVE
BUN SERPL-MCNC: 18 MG/DL (ref 6–20)
BUN/CREAT SERPL: 21 (ref 12–20)
CALCIUM SERPL-MCNC: 8.9 MG/DL (ref 8.5–10.1)
CHLORIDE SERPL-SCNC: 103 MMOL/L (ref 97–108)
CO2 SERPL-SCNC: 28 MMOL/L (ref 21–32)
COLOR UR: ABNORMAL
CREAT SERPL-MCNC: 0.85 MG/DL (ref 0.55–1.02)
EOSINOPHIL # BLD: 0.4 K/UL (ref 0–0.4)
EOSINOPHIL NFR BLD: 4 % (ref 0–7)
EPITH CASTS URNS QL MICRO: ABNORMAL /LPF
ERYTHROCYTE [DISTWIDTH] IN BLOOD BY AUTOMATED COUNT: 14.2 % (ref 11.5–14.5)
GLOBULIN SER CALC-MCNC: 4.1 G/DL (ref 2–4)
GLUCOSE SERPL-MCNC: 86 MG/DL (ref 65–100)
GLUCOSE UR STRIP.AUTO-MCNC: NEGATIVE MG/DL
HCG UR QL: NEGATIVE
HCT VFR BLD AUTO: 38.9 % (ref 35–47)
HGB BLD-MCNC: 12.9 G/DL (ref 11.5–16)
HGB UR QL STRIP: ABNORMAL
KETONES UR QL STRIP.AUTO: NEGATIVE MG/DL
LEUKOCYTE ESTERASE UR QL STRIP.AUTO: NEGATIVE
LIPASE SERPL-CCNC: 129 U/L (ref 73–393)
LYMPHOCYTES # BLD AUTO: 30 % (ref 12–49)
LYMPHOCYTES # BLD: 2.5 K/UL (ref 0.8–3.5)
MCH RBC QN AUTO: 27.8 PG (ref 26–34)
MCHC RBC AUTO-ENTMCNC: 33.2 G/DL (ref 30–36.5)
MCV RBC AUTO: 83.8 FL (ref 80–99)
MONOCYTES # BLD: 0.8 K/UL (ref 0–1)
MONOCYTES NFR BLD AUTO: 10 % (ref 5–13)
NEUTS SEG # BLD: 4.6 K/UL (ref 1.8–8)
NEUTS SEG NFR BLD AUTO: 55 % (ref 32–75)
NITRITE UR QL STRIP.AUTO: NEGATIVE
PH UR STRIP: 6.5 [PH] (ref 5–8)
PLATELET # BLD AUTO: 269 K/UL (ref 150–400)
POTASSIUM SERPL-SCNC: 3.9 MMOL/L (ref 3.5–5.1)
PROT SERPL-MCNC: 8.1 G/DL (ref 6.4–8.2)
PROT UR STRIP-MCNC: NEGATIVE MG/DL
RBC # BLD AUTO: 4.64 M/UL (ref 3.8–5.2)
RBC #/AREA URNS HPF: ABNORMAL /HPF (ref 0–5)
SODIUM SERPL-SCNC: 138 MMOL/L (ref 136–145)
SP GR UR REFRACTOMETRY: 1.02 (ref 1–1.03)
UROBILINOGEN UR QL STRIP.AUTO: 1 EU/DL (ref 0.2–1)
WBC # BLD AUTO: 8.3 K/UL (ref 3.6–11)
WBC URNS QL MICRO: ABNORMAL /HPF (ref 0–4)

## 2017-05-15 PROCEDURE — 85025 COMPLETE CBC W/AUTO DIFF WBC: CPT | Performed by: PHYSICIAN ASSISTANT

## 2017-05-15 PROCEDURE — 83690 ASSAY OF LIPASE: CPT | Performed by: PHYSICIAN ASSISTANT

## 2017-05-15 PROCEDURE — 81025 URINE PREGNANCY TEST: CPT | Performed by: EMERGENCY MEDICINE

## 2017-05-15 PROCEDURE — 81001 URINALYSIS AUTO W/SCOPE: CPT | Performed by: EMERGENCY MEDICINE

## 2017-05-15 PROCEDURE — 80053 COMPREHEN METABOLIC PANEL: CPT | Performed by: PHYSICIAN ASSISTANT

## 2017-05-15 PROCEDURE — 36415 COLL VENOUS BLD VENIPUNCTURE: CPT | Performed by: PHYSICIAN ASSISTANT

## 2017-05-15 PROCEDURE — 99283 EMERGENCY DEPT VISIT LOW MDM: CPT

## 2017-05-15 RX ORDER — CEPHALEXIN 500 MG/1
500 CAPSULE ORAL 3 TIMES DAILY
Qty: 21 CAP | Refills: 0 | Status: SHIPPED | OUTPATIENT
Start: 2017-05-15 | End: 2017-05-22

## 2017-05-15 RX ORDER — HYDROCODONE BITARTRATE AND ACETAMINOPHEN 5; 325 MG/1; MG/1
1 TABLET ORAL
Qty: 15 TAB | Refills: 0 | Status: SHIPPED | OUTPATIENT
Start: 2017-05-15 | End: 2017-06-25

## 2017-05-15 NOTE — ED NOTES
PA has reviewed discharge instructions with the patient. The patient verbalized understanding. Patient discharged home ambulatory.

## 2017-05-15 NOTE — ED PROVIDER NOTES
HPI Comments: Darren Martinez is a 28 y.o. female with PMHx significant for asthma, depression and ectopic pregnancy presenting ambulatory to [facility] ED with c/o cramping lower abdominal pain x 1 week. the pt reports that she usually experiences similar pain a day prior to beginning her menstrual cycle but notes that it has been a week now and her pain has still not improved. Per the pt's medical records the pt was last seen here in the ED on 04/17/2017 with c/o worsening depression, anxiety x few weeks s/p recently losing 2 family members. She denies n/v/d, fever or any chance of pregnancy. PCP: Kailey Cantrell MD  PSHx: cholecystectomy   Social History:  (-) Tobacco (quit 04/01/2016),   (+) EtOH,      (-) Drugs     There are no other complaints, changes, or physical findings at this time. The history is provided by the patient.         Past Medical History:   Diagnosis Date    Asthma     Delivery normal     Depression     Ectopic pregnancy     Miscarriage     Other ill-defined conditions     ectopic pregnancy    Psychiatric disorder     anger        Past Surgical History:   Procedure Laterality Date    ABDOMEN SURGERY PROC UNLISTED      HX CHOLECYSTECTOMY      HX GYN      right oophetrecomy    HX HERNIA REPAIR  12/5/14    Open repair right inguinal hernia with mesh    HX ORTHOPAEDIC      Right arm/leg         Family History:   Problem Relation Age of Onset    Cancer Mother      breast    Hypertension Mother     Thyroid Disease Mother     Sickle Cell Trait Father     Asthma Sister     Asthma Brother     Asthma Sister     Asthma Sister     Asthma Sister     Asthma Sister     Asthma Sister     Asthma Brother     Asthma Brother     Asthma Brother     Asthma Brother     Sickle Cell Anemia Brother     Asthma Brother     Diabetes Paternal Grandmother        Social History     Social History    Marital status:      Spouse name: N/A    Number of children: N/A    Years of education: N/A     Occupational History    Not on file. Social History Main Topics    Smoking status: Current Some Day Smoker     Packs/day: 0.25     Types: Cigarettes     Last attempt to quit: 04/2016    Smokeless tobacco: Never Used      Comment: social smoker    Alcohol use Yes      Comment: occasionally    Drug use: No      Comment: last used March 1, 2015    Sexual activity: No     Other Topics Concern    Not on file     Social History Narrative         ALLERGIES: Ibuprofen    Review of Systems   Constitutional: Negative for fatigue and fever. HENT: Negative for ear pain and sore throat. Eyes: Negative for pain, redness and visual disturbance. Respiratory: Negative for cough and shortness of breath. Cardiovascular: Negative for chest pain and palpitations. Gastrointestinal: Positive for abdominal pain. Negative for nausea and vomiting. Genitourinary: Negative for dysuria, frequency and urgency. Musculoskeletal: Negative for back pain, gait problem, neck pain and neck stiffness. Skin: Negative for rash and wound. Neurological: Negative for dizziness, weakness, light-headedness, numbness and headaches. All other systems reviewed and are negative. Vitals:    05/15/17 1419   BP: (!) 142/95   Pulse: 91   Resp: 16   Temp: 98.7 °F (37.1 °C)   SpO2: 100%   Weight: 76.5 kg (168 lb 10.4 oz)   Height: 5' 8\" (1.727 m)            Physical Exam   Constitutional: She is oriented to person, place, and time. She appears well-developed and well-nourished. No distress. HENT:   Head: Normocephalic and atraumatic. Mouth/Throat: Oropharynx is clear and moist.   Eyes: Conjunctivae and EOM are normal. Pupils are equal, round, and reactive to light. No scleral icterus. Neck: Normal range of motion. Neck supple. Cardiovascular: Normal rate and regular rhythm. Exam reveals no gallop. No murmur heard. Pulmonary/Chest: Effort normal. No stridor. No respiratory distress. She has no wheezes. She has no rales. Abdominal: Soft. Bowel sounds are normal. She exhibits no distension and no mass. There is no tenderness. There is no rebound and no guarding. Mild suprapubic discomfort    Musculoskeletal: Normal range of motion. She exhibits no edema. Lymphadenopathy:     She has no cervical adenopathy. Neurological: She is alert and oriented to person, place, and time. No cranial nerve deficit. Coordination normal.   Skin: Skin is warm and dry. No rash noted. No erythema. Psychiatric: She has a normal mood and affect. Nursing note and vitals reviewed. MDM  Number of Diagnoses or Management Options  Acute cystitis without hematuria:   Diagnosis management comments: DDx includes hepatitis, pancreatitis, cholecystitis, appendicitis, diverticulitis, obstruction, UTI, pyelonephritis, gastroenteritis, gastritis. Though some of these considerations can be excluded by history and physical exam, others may require additional testing such as laboratory and imaging. Amount and/or Complexity of Data Reviewed  Clinical lab tests: ordered and reviewed  Review and summarize past medical records: yes    Patient Progress  Patient progress: stable    ED Course       Procedures    4:54 PM  Carlene Alexander's final results have been reviewed with her. She has been counseled regarding her diagnosis. She verbally conveys understanding and agreement of the signs, symptoms, diagnosis, treatment and prognosis .      LABORATORY TESTS:  Recent Results (from the past 12 hour(s))   URINALYSIS W/ RFLX MICROSCOPIC    Collection Time: 05/15/17  2:24 PM   Result Value Ref Range    Color YELLOW/STRAW      Appearance CLEAR CLEAR      Specific gravity 1.024 1.003 - 1.030      pH (UA) 6.5 5.0 - 8.0      Protein NEGATIVE  NEG mg/dL    Glucose NEGATIVE  NEG mg/dL    Ketone NEGATIVE  NEG mg/dL    Bilirubin NEGATIVE  NEG      Blood SMALL (A) NEG      Urobilinogen 1.0 0.2 - 1.0 EU/dL    Nitrites NEGATIVE  NEG      Leukocyte Esterase NEGATIVE  NEG      WBC 0-4 0 - 4 /hpf    RBC 5-10 0 - 5 /hpf    Epithelial cells FEW FEW /lpf    Bacteria 1+ (A) NEG /hpf   HCG URINE, QL    Collection Time: 05/15/17  2:24 PM   Result Value Ref Range    HCG urine, Ql. NEGATIVE  NEG     CBC WITH AUTOMATED DIFF    Collection Time: 05/15/17  2:44 PM   Result Value Ref Range    WBC 8.3 3.6 - 11.0 K/uL    RBC 4.64 3.80 - 5.20 M/uL    HGB 12.9 11.5 - 16.0 g/dL    HCT 38.9 35.0 - 47.0 %    MCV 83.8 80.0 - 99.0 FL    MCH 27.8 26.0 - 34.0 PG    MCHC 33.2 30.0 - 36.5 g/dL    RDW 14.2 11.5 - 14.5 %    PLATELET 252 106 - 135 K/uL    NEUTROPHILS 55 32 - 75 %    LYMPHOCYTES 30 12 - 49 %    MONOCYTES 10 5 - 13 %    EOSINOPHILS 4 0 - 7 %    BASOPHILS 1 0 - 1 %    ABS. NEUTROPHILS 4.6 1.8 - 8.0 K/UL    ABS. LYMPHOCYTES 2.5 0.8 - 3.5 K/UL    ABS. MONOCYTES 0.8 0.0 - 1.0 K/UL    ABS. EOSINOPHILS 0.4 0.0 - 0.4 K/UL    ABS. BASOPHILS 0.1 0.0 - 0.1 K/UL   METABOLIC PANEL, COMPREHENSIVE    Collection Time: 05/15/17  2:44 PM   Result Value Ref Range    Sodium 138 136 - 145 mmol/L    Potassium 3.9 3.5 - 5.1 mmol/L    Chloride 103 97 - 108 mmol/L    CO2 28 21 - 32 mmol/L    Anion gap 7 5 - 15 mmol/L    Glucose 86 65 - 100 mg/dL    BUN 18 6 - 20 MG/DL    Creatinine 0.85 0.55 - 1.02 MG/DL    BUN/Creatinine ratio 21 (H) 12 - 20      GFR est AA >60 >60 ml/min/1.73m2    GFR est non-AA >60 >60 ml/min/1.73m2    Calcium 8.9 8.5 - 10.1 MG/DL    Bilirubin, total 0.3 0.2 - 1.0 MG/DL    ALT (SGPT) 20 12 - 78 U/L    AST (SGOT) 10 (L) 15 - 37 U/L    Alk. phosphatase 81 45 - 117 U/L    Protein, total 8.1 6.4 - 8.2 g/dL    Albumin 4.0 3.5 - 5.0 g/dL    Globulin 4.1 (H) 2.0 - 4.0 g/dL    A-G Ratio 1.0 (L) 1.1 - 2.2     LIPASE    Collection Time: 05/15/17  2:44 PM   Result Value Ref Range    Lipase 129 73 - 393 U/L     MEDICATIONS GIVEN:  Medications - No data to display    IMPRESSION:  1. Acute cystitis without hematuria        PLAN:  1.    Current Discharge Medication List      START taking these medications    Details   cephALEXin (KEFLEX) 500 mg capsule Take 1 Cap by mouth three (3) times daily for 7 days. Qty: 21 Cap, Refills: 0      HYDROcodone-acetaminophen (NORCO) 5-325 mg per tablet Take 1 Tab by mouth every four (4) hours as needed for Pain. Max Daily Amount: 6 Tabs. Qty: 15 Tab, Refills: 0           2. Follow-up Information     Follow up With Details Comments Contact Info    Shelia Geronimo MD Schedule an appointment as soon as possible for a visit PRIMARY CARE: call to schedule follow up [address] [facility]  [phone]          Return to ED if worse     DISCHARGE NOTE  4:54 PM  The patient has been re-evaluated and is ready for discharge. Reviewed available results with patient. Counseled pt on diagnosis and care plan. Pt has expressed understanding, and all questions have been answered. Pt agrees with plan and agrees to F/U as recommended, or return to the ED if their sxs worsen. Discharge instructions have been provided and explained to the pt, along with reasons to return to the ED. This note is prepared by Janine Chin, acting as Scribe for Kenisha Hook. DORI Oneil: The scribe's documentation has been prepared under my direction and personally reviewed by me in its entirety. I confirm that the note above accurately reflects all work, treatment, procedures, and medical decision making performed by me.

## 2017-05-15 NOTE — DISCHARGE INSTRUCTIONS
Urinary Tract Infection in Women: Care Instructions  Your Care Instructions    A urinary tract infection, or UTI, is a general term for an infection anywhere between the kidneys and the urethra (where urine comes out). Most UTIs are bladder infections. They often cause pain or burning when you urinate. UTIs are caused by bacteria and can be cured with antibiotics. Be sure to complete your treatment so that the infection goes away. Follow-up care is a key part of your treatment and safety. Be sure to make and go to all appointments, and call your doctor if you are having problems. It's also a good idea to know your test results and keep a list of the medicines you take. How can you care for yourself at home? · Take your antibiotics as directed. Do not stop taking them just because you feel better. You need to take the full course of antibiotics. · Drink extra water and other fluids for the next day or two. This may help wash out the bacteria that are causing the infection. (If you have kidney, heart, or liver disease and have to limit fluids, talk with your doctor before you increase your fluid intake.)  · Avoid drinks that are carbonated or have caffeine. They can irritate the bladder. · Urinate often. Try to empty your bladder each time. · To relieve pain, take a hot bath or lay a heating pad set on low over your lower belly or genital area. Never go to sleep with a heating pad in place. To prevent UTIs  · Drink plenty of water each day. This helps you urinate often, which clears bacteria from your system. (If you have kidney, heart, or liver disease and have to limit fluids, talk with your doctor before you increase your fluid intake.)  · Urinate when you need to. · Urinate right after you have sex. · Change sanitary pads often. · Avoid douches, bubble baths, feminine hygiene sprays, and other feminine hygiene products that have deodorants.   · After going to the bathroom, wipe from front to back.  When should you call for help? Call your doctor now or seek immediate medical care if:  · Symptoms such as fever, chills, nausea, or vomiting get worse or appear for the first time. · You have new pain in your back just below your rib cage. This is called flank pain. · There is new blood or pus in your urine. · You have any problems with your antibiotic medicine. Watch closely for changes in your health, and be sure to contact your doctor if:  · You are not getting better after taking an antibiotic for 2 days. · Your symptoms go away but then come back. Where can you learn more? Go to http://bethany-nia.info/. Enter B772 in the search box to learn more about \"Urinary Tract Infection in Women: Care Instructions. \"  Current as of: November 28, 2016  Content Version: 11.2  © 0648-5082 [employer]. Care instructions adapted under license by [employer] (which disclaims liability or warranty for this information). If you have questions about a medical condition or this instruction, always ask your healthcare professional. [address] any warranty or liability for your use of this information.

## 2017-05-15 NOTE — ED NOTES
Assumed care of patient who presents to ED with c/o 1 week hx abdominal pain which has become worse over last 2-3 days. The pain is lower mid abdomen. She denies nausea, vomiting, and dysuria. She also denies vaginal discharge. Patient is in NAD and is resting in position of comfort. Updated her on test results. Call bell in reach and visitor in room. Will continue to monitor.

## 2017-06-25 ENCOUNTER — HOSPITAL ENCOUNTER (EMERGENCY)
Age: 32
Discharge: HOME | End: 2017-06-25
Attending: EMERGENCY MEDICINE
Payer: MEDICAID

## 2017-06-25 VITALS
WEIGHT: 170 LBS | RESPIRATION RATE: 17 BRPM | HEART RATE: 64 BPM | DIASTOLIC BLOOD PRESSURE: 77 MMHG | BODY MASS INDEX: 25.18 KG/M2 | OXYGEN SATURATION: 100 % | TEMPERATURE: 98.5 F | SYSTOLIC BLOOD PRESSURE: 135 MMHG | HEIGHT: 69 IN

## 2017-06-25 DIAGNOSIS — M76.899 ADDUCTOR TENDONITIS: Primary | ICD-10-CM

## 2017-06-25 PROCEDURE — 99282 EMERGENCY DEPT VISIT SF MDM: CPT

## 2017-06-25 RX ORDER — HYDROCODONE BITARTRATE AND ACETAMINOPHEN 5; 325 MG/1; MG/1
1 TABLET ORAL
Qty: 8 TAB | Refills: 0 | Status: SHIPPED | OUTPATIENT
Start: 2017-06-25 | End: 2017-06-27

## 2017-06-26 NOTE — ED NOTES
Discharge instructions were given to the patient by Farhan Mosher RN. The patient left the Emergency Department ambulatory, alert and oriented and in no acute distress with 1 prescription. The patient was encouraged to call or return to the ED for worsening issues or problems and was encouraged to schedule a follow up appointment for continuing care. The patient verbalized understanding of discharge instructions and prescriptions, all questions were answered. The patient has no further concerns at this time.

## 2017-06-26 NOTE — ED NOTES
Pt presents ambulatory to ED complaining of right sided groin pain x3 weeks. Pt reports taking Tylenol with no relief. Pt denies recent injury or trauma to leg. Pt is alert and oriented x 4, RR even and unlabored, skin is warm and dry. Assesment completed and pt updated on plan of care. Emergency Department Nursing Plan of Care       The Nursing Plan of Care is developed from the Nursing assessment and Emergency Department Attending provider initial evaluation. The plan of care may be reviewed in the ED Provider note.     The Plan of Care was developed with the following considerations:   Patient / Family readiness to learn indicated by:verbalized understanding  Persons(s) to be included in education: patient  Barriers to Learning/Limitations:No    Signed     Nancy Deleon RN    6/25/2017   8:50 PM

## 2017-06-26 NOTE — ED PROVIDER NOTES
HPI Comments: Jaz Concepcion is a 28 y.o. female with PMHx of asthma / depression who presents ambulatory to the ED c/o intermittent episodes of L groin pain radiating down her L leg x 3 weeks. Pt notes that her pain has become more constant over the last 3 days. She states that her pain is exacerbated with ambulation. She denies any known trauma or injury. Pt specifically denies nausea, vomiting, fever, chills, CP, or SOB. Social hx: - Tobacco use (former - 4/1/2016), + EtOH use (occasional), - Illicit drug use    PCP: Jeff Todd MD    There are no other complaints, changes or physical findings at this time. The history is provided by the patient. No  was used. Past Medical History:   Diagnosis Date    Asthma     Delivery normal     Depression     Ectopic pregnancy     Miscarriage     Other ill-defined conditions     ectopic pregnancy    Psychiatric disorder     anger        Past Surgical History:   Procedure Laterality Date    ABDOMEN SURGERY PROC UNLISTED      HX CHOLECYSTECTOMY      HX GYN      right oophetrecomy    HX HERNIA REPAIR  12/5/14    Open repair right inguinal hernia with mesh    HX ORTHOPAEDIC      Right arm/leg         Family History:   Problem Relation Age of Onset    Cancer Mother      breast    Hypertension Mother     Thyroid Disease Mother     Sickle Cell Trait Father     Asthma Sister     Asthma Brother     Asthma Sister     Asthma Sister     Asthma Sister     Asthma Sister     Asthma Sister     Asthma Brother     Asthma Brother     Asthma Brother     Asthma Brother     Sickle Cell Anemia Brother     Asthma Brother     Diabetes Paternal Grandmother        Social History     Social History    Marital status:      Spouse name: N/A    Number of children: N/A    Years of education: N/A     Occupational History    Not on file.      Social History Main Topics    Smoking status: Former Smoker     Packs/day: 0.25 Types: Cigarettes     Quit date: 04/2016    Smokeless tobacco: Never Used      Comment: social smoker    Alcohol use Yes      Comment: occasionally    Drug use: No      Comment: last used March 1, 2015    Sexual activity: No     Other Topics Concern    Not on file     Social History Narrative         ALLERGIES: Ibuprofen    Review of Systems   Constitutional: Negative for chills, diaphoresis, fever and unexpected weight change. HENT: Negative for congestion and sore throat. Eyes: Negative for discharge and visual disturbance. Respiratory: Negative for cough and shortness of breath. Cardiovascular: Negative for chest pain. - dyspnea on exacerbation   Gastrointestinal: Negative for abdominal pain, diarrhea, nausea and vomiting. Endocrine: Negative for polydipsia, polyphagia and polyuria. Genitourinary: Negative for dysuria and frequency. Musculoskeletal: Negative for arthralgias and neck pain.        + L groin pain   Skin: Negative for rash and wound. Allergic/Immunologic: Negative for environmental allergies, food allergies and immunocompromised state. Neurological: Negative for seizures, syncope and headaches. Hematological: Negative for adenopathy. Does not bruise/bleed easily. Psychiatric/Behavioral: Negative for behavioral problems, hallucinations and sleep disturbance. Vitals:    06/25/17 2035   BP: 135/77   Pulse: 64   Resp: 17   Temp: 98.5 °F (36.9 °C)   SpO2: 100%   Weight: 77.1 kg (170 lb)   Height: 5' 9\" (1.753 m)            Physical Exam   Constitutional: She is oriented to person, place, and time. She appears well-nourished. No distress. HENT:   Head: Normocephalic and atraumatic. Mouth/Throat: Oropharynx is clear and moist.   Eyes: Conjunctivae are normal. Right eye exhibits no discharge. Left eye exhibits no discharge. Neck: Neck supple. No tracheal deviation present. Cardiovascular: Normal rate and regular rhythm.   Exam reveals no gallop and no friction rub.    No murmur heard. Pulmonary/Chest: Breath sounds normal. No respiratory distress. She has no wheezes. She has no rales. Abdominal: Soft. Bowel sounds are normal. She exhibits no distension. There is no tenderness. Musculoskeletal: She exhibits no edema. L groin pain brought on by SLR and adduction of L leg. Tenderness of L adductor tendon. Lymphadenopathy:     She has no cervical adenopathy. Neurological: She is alert and oriented to person, place, and time. Skin: Skin is warm. No rash noted. She is not diaphoretic. Psychiatric: She has a normal mood and affect. Thought content normal.        MDM  Number of Diagnoses or Management Options  Adductor tendonitis:   Diagnosis management comments: DDx: strain, sprain, overuse. No signs of DVT. Amount and/or Complexity of Data Reviewed  Review and summarize past medical records: yes    Patient Progress  Patient progress: stable    ED Course       Procedures      IMPRESSION:  1. Adductor tendonitis        PLAN:  1. Current Discharge Medication List      START taking these medications    Details   HYDROcodone-acetaminophen (NORCO) 5-325 mg per tablet Take 1 Tab by mouth every four (4) hours as needed for Pain. Max Daily Amount: 6 Tabs. Qty: 8 Tab, Refills: 0           2. Follow-up Information     Follow up With Details Comments 350 Pete Wray MD   [facility] [phone]          Return to ED if worse     DISCHARGE NOTE  8:57 PM  The patient has been re-evaluated and is ready for discharge. Reviewed available results with patient. Counseled pt on diagnosis and care plan. Pt has expressed understanding, and all questions have been answered. Pt agrees with plan and agrees to F/U as recommended, or return to the ED if their sxs worsen. Discharge instructions have been provided and explained to the pt, along with reasons to return to the ED.     This note is prepared by Rajwinder Quiñones, acting as Scribe for Margaret More MD.    Margaret More MD: The scribe's documentation has been prepared under my direction and personally reviewed by me in its entirety. I confirm that the note above accurately reflects all work, treatment, procedures, and medical decision making performed by me.

## 2017-06-27 ENCOUNTER — HOSPITAL ENCOUNTER (EMERGENCY)
Age: 32
Discharge: HOME | End: 2017-06-27
Attending: EMERGENCY MEDICINE
Payer: MEDICAID

## 2017-06-27 ENCOUNTER — APPOINTMENT (OUTPATIENT)
Dept: GENERAL RADIOLOGY | Age: 32
End: 2017-06-27
Attending: PHYSICIAN ASSISTANT
Payer: MEDICAID

## 2017-06-27 VITALS
HEIGHT: 68 IN | BODY MASS INDEX: 26.06 KG/M2 | DIASTOLIC BLOOD PRESSURE: 86 MMHG | HEART RATE: 68 BPM | OXYGEN SATURATION: 99 % | SYSTOLIC BLOOD PRESSURE: 137 MMHG | RESPIRATION RATE: 16 BRPM | TEMPERATURE: 98.6 F | WEIGHT: 171.96 LBS

## 2017-06-27 DIAGNOSIS — M79.652 ACUTE THIGH PAIN, LEFT: Primary | ICD-10-CM

## 2017-06-27 LAB
APPEARANCE UR: CLEAR
BACTERIA URNS QL MICRO: NEGATIVE /HPF
BILIRUB UR QL: NEGATIVE
COLOR UR: ABNORMAL
EPITH CASTS URNS QL MICRO: ABNORMAL /LPF
GLUCOSE UR STRIP.AUTO-MCNC: NEGATIVE MG/DL
HCG UR QL: NEGATIVE
HGB UR QL STRIP: NEGATIVE
KETONES UR QL STRIP.AUTO: ABNORMAL MG/DL
LEUKOCYTE ESTERASE UR QL STRIP.AUTO: NEGATIVE
MUCOUS THREADS URNS QL MICRO: ABNORMAL /LPF
NITRITE UR QL STRIP.AUTO: NEGATIVE
PH UR STRIP: 6 [PH] (ref 5–8)
PROT UR STRIP-MCNC: NEGATIVE MG/DL
RBC #/AREA URNS HPF: ABNORMAL /HPF (ref 0–5)
SP GR UR REFRACTOMETRY: 1.02 (ref 1–1.03)
UA: UC IF INDICATED,UAUC: ABNORMAL
UROBILINOGEN UR QL STRIP.AUTO: 1 EU/DL (ref 0.2–1)
WBC URNS QL MICRO: ABNORMAL /HPF (ref 0–4)

## 2017-06-27 PROCEDURE — 74011250637 HC RX REV CODE- 250/637: Performed by: PHYSICIAN ASSISTANT

## 2017-06-27 PROCEDURE — 81025 URINE PREGNANCY TEST: CPT

## 2017-06-27 PROCEDURE — 73502 X-RAY EXAM HIP UNI 2-3 VIEWS: CPT

## 2017-06-27 PROCEDURE — 99283 EMERGENCY DEPT VISIT LOW MDM: CPT

## 2017-06-27 PROCEDURE — 81001 URINALYSIS AUTO W/SCOPE: CPT | Performed by: PHYSICIAN ASSISTANT

## 2017-06-27 RX ORDER — CYCLOBENZAPRINE HCL 10 MG
5 TABLET ORAL
Qty: 20 TAB | Refills: 0 | Status: SHIPPED | OUTPATIENT
Start: 2017-06-27 | End: 2017-08-22

## 2017-06-27 RX ORDER — HYDROCODONE BITARTRATE AND ACETAMINOPHEN 10; 325 MG/1; MG/1
1 TABLET ORAL
Status: COMPLETED | OUTPATIENT
Start: 2017-06-27 | End: 2017-06-27

## 2017-06-27 RX ORDER — CYCLOBENZAPRINE HCL 10 MG
10 TABLET ORAL
Status: COMPLETED | OUTPATIENT
Start: 2017-06-27 | End: 2017-06-27

## 2017-06-27 RX ORDER — HYDROCODONE BITARTRATE AND ACETAMINOPHEN 7.5; 325 MG/1; MG/1
1 TABLET ORAL
Qty: 20 TAB | Refills: 0 | Status: SHIPPED | OUTPATIENT
Start: 2017-06-27 | End: 2017-08-22

## 2017-06-27 RX ADMIN — CYCLOBENZAPRINE HYDROCHLORIDE 10 MG: 10 TABLET, FILM COATED ORAL at 21:05

## 2017-06-27 RX ADMIN — HYDROCODONE BITARTRATE AND ACETAMINOPHEN 1 TABLET: 10; 325 TABLET ORAL at 21:05

## 2017-06-27 NOTE — LETTER
[address] 
[facility] EMERGENCY DEPT 
[address] [place] [address] [address] [id number] [phone] Work/School Note Date: 6/27/2017 To Whom It May concern: 
 
Mitch Lou was seen and treated today in the emergency room by the following provider(s): 
Attending Provider: Ronit Flannery MD 
Physician Assistant: HOWARD Lopez. Mitch Lou may return to work on 6/30/17 or sooner, if feeling better. Sincerely, HOWARD Lopez

## 2017-06-28 NOTE — ED PROVIDER NOTES
HPI Comments: Terrell Villeda is a 28 y.o. female with PMhx significant for depression and asthma who presents ambulatory to the ED c/o sharp, stabbing, intermittent left anterior thigh pain x3 weeks becoming constant over the last 2 days. The pt states that she was seen at [facility] on 6/25 and was discharged with medication for pain management. She endorses that she has been taking the medication WNR of her sx leading her back to the ED. She expresses that she is on her feet a lot at work but does not do any heavy lifting. She denies any injuries to the affected area. The pt denies being on any hormone therapy or any h/o DM, kidney disease, liver or thyroid disease. She specifically denies any color changes to the LLE. PCP: Raffi Dennis MD      There are no other complaints, changes or physical findings at this time. Written by Cherise Hodges ED Scribe, as dictated by DORI Farias    The history is provided by the patient. No  was used.         Past Medical History:   Diagnosis Date    Asthma     Delivery normal     Depression     Ectopic pregnancy     Miscarriage     Other ill-defined conditions     ectopic pregnancy    Psychiatric disorder     anger        Past Surgical History:   Procedure Laterality Date    ABDOMEN SURGERY PROC UNLISTED      HX CHOLECYSTECTOMY      HX GYN      right oophetrecomy    HX HERNIA REPAIR  12/5/14    Open repair right inguinal hernia with mesh    HX ORTHOPAEDIC      Right arm/leg         Family History:   Problem Relation Age of Onset    Cancer Mother      breast    Hypertension Mother     Thyroid Disease Mother     Sickle Cell Trait Father     Asthma Sister     Asthma Brother     Asthma Sister     Asthma Sister     Asthma Sister     Asthma Sister     Asthma Sister     Asthma Brother     Asthma Brother     Asthma Brother     Asthma Brother     Sickle Cell Anemia Brother     Asthma Brother     Diabetes Paternal Grandmother Social History     Social History    Marital status:      Spouse name: N/A    Number of children: N/A    Years of education: N/A     Occupational History    Not on file. Social History Main Topics    Smoking status: Former Smoker     Packs/day: 0.25     Types: Cigarettes     Quit date: 04/2016    Smokeless tobacco: Never Used      Comment: social smoker    Alcohol use Yes      Comment: occasionally    Drug use: No      Comment: last used March 1, 2015    Sexual activity: No     Other Topics Concern    Not on file     Social History Narrative         ALLERGIES: Ibuprofen    Review of Systems   Constitutional: Negative. Negative for fever. HENT: Negative. Eyes: Negative. Respiratory: Negative. Cardiovascular: Negative. Gastrointestinal: Negative. Negative for constipation, diarrhea, nausea and vomiting. Denies liver disease   Endocrine:        Denies thyroid disease   Genitourinary: Negative. Negative for dysuria. Denies kidney disease   Musculoskeletal: Positive for myalgias (left anterior thigh ). Skin: Negative. Negative for color change. Neurological: Negative. All other systems reviewed and are negative. Vitals:    06/27/17 1831   BP: 137/86   Pulse: 68   Resp: 16   Temp: 98.6 °F (37 °C)   SpO2: 99%   Weight: 78 kg (171 lb 15.3 oz)   Height: 5' 8\" (1.727 m)            Physical Exam   Constitutional: She is oriented to person, place, and time. She appears well-developed and well-nourished. No distress. HENT:   Head: Normocephalic and atraumatic. Right Ear: External ear normal.   Left Ear: External ear normal.   Nose: Nose normal.   Mouth/Throat: Oropharynx is clear and moist. No oropharyngeal exudate. Eyes: Conjunctivae and EOM are normal. Pupils are equal, round, and reactive to light. Right eye exhibits no discharge. Left eye exhibits no discharge. No scleral icterus. Neck: Normal range of motion. Neck supple.  No tracheal deviation present. Cardiovascular: Normal rate, regular rhythm, normal heart sounds and intact distal pulses. Exam reveals no gallop and no friction rub. No murmur heard. Pulmonary/Chest: Effort normal and breath sounds normal. No respiratory distress. She has no wheezes. She has no rales. She exhibits no tenderness. Abdominal: Soft. Bowel sounds are normal. She exhibits no distension and no mass. There is no tenderness. There is no rebound and no guarding. Musculoskeletal: She exhibits tenderness (left anterior thigh ). She exhibits no edema. No bony tenderness of the left hip      Lymphadenopathy:     She has no cervical adenopathy. Neurological: She is alert and oriented to person, place, and time. No cranial nerve deficit. Neurovascularly intact to the LLE    Skin: Skin is warm and dry. No rash noted. No erythema. Well healed scar to the left anterior thigh   No contusions or erythema of the LLE    Psychiatric: She has a normal mood and affect. Her behavior is normal.   Nursing note and vitals reviewed. MDM  Number of Diagnoses or Management Options  Diagnosis management comments: DDx: Dehydration, Strain, Sprain, Arthritis, Muscle spasms. Amount and/or Complexity of Data Reviewed  Clinical lab tests: ordered and reviewed  Tests in the radiology section of CPT®: ordered and reviewed  Review and summarize past medical records: yes    Patient Progress  Patient progress: stable    ED Course       Procedures    Procedure Note - Ace Wrap Placement:  10:00 PM  Performed by: Ruth Dominguez  Neurovascularly intact prior to tx. An ace wrap was placed on pt's left thigh. Joint was placed in neutral position. Neurovascularly intact after tx. The procedure took 1-15 minutes, and pt tolerated well. Written by Dorothy Gar ED Scribe, as dictated by DORI Alejandre.      LABORATORY TESTS:  Recent Results (from the past 12 hour(s))   HCG URINE, QL. - POC    Collection Time: 06/27/17  9:07 PM Result Value Ref Range    Pregnancy test,urine (POC) NEGATIVE  NEG     URINALYSIS W/ REFLEX CULTURE    Collection Time: 17  9:09 PM   Result Value Ref Range    Color YELLOW/STRAW      Appearance CLEAR CLEAR      Specific gravity 1.025 1.003 - 1.030      pH (UA) 6.0 5.0 - 8.0      Protein NEGATIVE  NEG mg/dL    Glucose NEGATIVE  NEG mg/dL    Ketone TRACE (A) NEG mg/dL    Bilirubin NEGATIVE  NEG      Blood NEGATIVE  NEG      Urobilinogen 1.0 0.2 - 1.0 EU/dL    Nitrites NEGATIVE  NEG      Leukocyte Esterase NEGATIVE  NEG      WBC 0-4 0 - 4 /hpf    RBC 0-5 0 - 5 /hpf    Epithelial cells FEW FEW /lpf    Bacteria NEGATIVE  NEG /hpf    UA:UC IF INDICATED CULTURE NOT INDICATED BY UA RESULT CNI      Mucus TRACE (A) NEG /lpf       IMAGING RESULTS:  XR HIP LT W OR WO PELV 2-3 VWS   Final Result   EXAM:  XR HIP LT W OR WO PELV 2-3 VWS     INDICATION:   pain. Left hip pain     COMPARISON: None.     FINDINGS: An AP view of the pelvis and a frogleg lateral view of the left hip  demonstrate no fracture, dislocation or other acute abnormality.     IMPRESSION  IMPRESSION:  No acute abnormality. MEDICATIONS GIVEN:  Medications   cyclobenzaprine (FLEXERIL) tablet 10 mg (10 mg Oral Given 17)   HYDROcodone-acetaminophen (NORCO)  mg tablet 1 Tab (1 Tab Oral Given 17)       IMPRESSION:  1. Acute thigh pain, left        PLAN:  1. Current Discharge Medication List      START taking these medications    Details   cyclobenzaprine (FLEXERIL) 10 mg tablet Take 0.5 Tabs by mouth three (3) times daily (with meals). Qty: 20 Tab, Refills: 0      HYDROcodone-acetaminophen (NORCO) 7.5-325 mg per tablet Take 1 Tab by mouth every eight (8) hours as needed for Pain. Max Daily Amount: 3 Tabs.   Qty: 20 Tab, Refills: 0         STOP taking these medications       HYDROcodone-acetaminophen (NORCO) 5-325 mg per tablet Comments:   Reason for Stoppin.   Follow-up Information     Follow up With Details Comments Contact Info    MD [facility] [address]  [phone]      Swetha Phillips,  Schedule an appointment as soon as possible for a visit bone and muscle specialist [facility] [id number]  [phone]      \[facility]\"" EMERGENCY DEPT  If symptoms worsen [address]  [address] [facility]  [phone]        3. Return to ED if worse   Discharge Note:  9:59 PM  The patient is ready for discharge. The patient's signs, symptoms, diagnosis, and discharge instruction have been discussed and the patient has conveyed their understanding. The patient is to follow up as recommended or return to the ER should their symptoms worsen. Plan has been discussed and the patient is in agreement. Written by Rashad Hodges ED Scribe, as dictated by DORI Banks Conception    Attestation: This note is prepared by Luella Ahumada. Barrett, acting as Scribe for Michelle Holm. DORI Banks Conception: The scribe's documentation has been prepared under my direction and personally reviewed by me in its entirety. I confirm that the note above accurately reflects all work, treatment, procedures, and medical decision making performed by me.

## 2017-06-28 NOTE — ED NOTES
HOWARD Banks reviewed discharge instructions with the patient and friend. The patient and friend verbalized understanding.

## 2017-06-28 NOTE — ED NOTES
Assumed care of pt from triage a this time. Pt arrives with c/o intermittent L upper leg pain x 3 weeks that has now become constant as of today. Pt denies any recent injuries. Pt says she was seen at [facility] a couple of weeks ago, \"and they didn't do anything. \"  Pt states pain radiated from L thigh to lower left leg (not left foot). Pt denies any warmness, swelling to the L calf. Pt resting comfortably on the stretcher in a position of comfort.  Pt in no acute distress at this time. Pt ANOx4.  Call bell within reach.  Side rails x 2.   Stretcher locked in the lowest position.  Pt aware of plan to await for MD/PA-C/NP assessment, and pt/family verbalizes understanding.  Will continue to monitor L leg pain.

## 2017-08-22 ENCOUNTER — HOSPITAL ENCOUNTER (EMERGENCY)
Age: 32
Discharge: HOME | End: 2017-08-22
Attending: EMERGENCY MEDICINE
Payer: MEDICAID

## 2017-08-22 VITALS
DIASTOLIC BLOOD PRESSURE: 81 MMHG | BODY MASS INDEX: 25.53 KG/M2 | TEMPERATURE: 97.3 F | RESPIRATION RATE: 16 BRPM | OXYGEN SATURATION: 98 % | HEIGHT: 68 IN | HEART RATE: 80 BPM | SYSTOLIC BLOOD PRESSURE: 127 MMHG | WEIGHT: 168.43 LBS

## 2017-08-22 DIAGNOSIS — B96.89 BV (BACTERIAL VAGINOSIS): ICD-10-CM

## 2017-08-22 DIAGNOSIS — N76.0 BV (BACTERIAL VAGINOSIS): ICD-10-CM

## 2017-08-22 DIAGNOSIS — N93.9 VAGINAL BLEEDING: Primary | ICD-10-CM

## 2017-08-22 LAB
APPEARANCE UR: CLEAR
BACTERIA URNS QL MICRO: NEGATIVE /HPF
BILIRUB UR QL: NEGATIVE
CLUE CELLS VAG QL WET PREP: NORMAL
COLOR UR: ABNORMAL
EPITH CASTS URNS QL MICRO: ABNORMAL /LPF
GLUCOSE UR STRIP.AUTO-MCNC: NEGATIVE MG/DL
HCG UR QL: NEGATIVE
HGB UR QL STRIP: ABNORMAL
HYALINE CASTS URNS QL MICRO: ABNORMAL /LPF (ref 0–5)
KETONES UR QL STRIP.AUTO: NEGATIVE MG/DL
KOH PREP SPEC: NORMAL
LEUKOCYTE ESTERASE UR QL STRIP.AUTO: NEGATIVE
NITRITE UR QL STRIP.AUTO: NEGATIVE
PH UR STRIP: 6.5 [PH] (ref 5–8)
PROT UR STRIP-MCNC: NEGATIVE MG/DL
RBC #/AREA URNS HPF: ABNORMAL /HPF (ref 0–5)
SERVICE CMNT-IMP: NORMAL
SP GR UR REFRACTOMETRY: 1.02 (ref 1–1.03)
T VAGINALIS VAG QL WET PREP: NORMAL
UA: UC IF INDICATED,UAUC: ABNORMAL
UROBILINOGEN UR QL STRIP.AUTO: 0.2 EU/DL (ref 0.2–1)
WBC URNS QL MICRO: ABNORMAL /HPF (ref 0–4)

## 2017-08-22 PROCEDURE — 99284 EMERGENCY DEPT VISIT MOD MDM: CPT

## 2017-08-22 PROCEDURE — 81001 URINALYSIS AUTO W/SCOPE: CPT | Performed by: PHYSICIAN ASSISTANT

## 2017-08-22 PROCEDURE — 81025 URINE PREGNANCY TEST: CPT | Performed by: PHYSICIAN ASSISTANT

## 2017-08-22 PROCEDURE — 87210 SMEAR WET MOUNT SALINE/INK: CPT | Performed by: PHYSICIAN ASSISTANT

## 2017-08-22 PROCEDURE — 74011250637 HC RX REV CODE- 250/637: Performed by: PHYSICIAN ASSISTANT

## 2017-08-22 PROCEDURE — 87491 CHLMYD TRACH DNA AMP PROBE: CPT | Performed by: PHYSICIAN ASSISTANT

## 2017-08-22 RX ORDER — HYDROCODONE BITARTRATE AND ACETAMINOPHEN 5; 325 MG/1; MG/1
1 TABLET ORAL
Qty: 10 TAB | Refills: 0 | Status: SHIPPED | OUTPATIENT
Start: 2017-08-22 | End: 2017-10-06

## 2017-08-22 RX ORDER — METRONIDAZOLE 500 MG/1
500 TABLET ORAL 2 TIMES DAILY
Qty: 14 TAB | Refills: 0 | Status: SHIPPED | OUTPATIENT
Start: 2017-08-22 | End: 2017-08-29

## 2017-08-22 RX ORDER — OXYCODONE AND ACETAMINOPHEN 5; 325 MG/1; MG/1
1 TABLET ORAL
Status: COMPLETED | OUTPATIENT
Start: 2017-08-22 | End: 2017-08-22

## 2017-08-22 RX ADMIN — OXYCODONE AND ACETAMINOPHEN 1 TABLET: 5; 325 TABLET ORAL at 16:24

## 2017-08-22 NOTE — DISCHARGE INSTRUCTIONS
Abnormal Uterine Bleeding: Care Instructions  Your Care Instructions  Abnormal uterine bleeding (AUB) is irregular bleeding from the uterus that is longer or heavier than usual or does not occur at your regular time. Sometimes it is caused by changes in hormone levels. It can also be caused by growths in the uterus, such as fibroids or polyps. Sometimes a cause cannot be found. You may have heavy bleeding when you are not expecting your period. Your doctor may suggest a pregnancy test, if you think you are pregnant. Follow-up care is a key part of your treatment and safety. Be sure to make and go to all appointments, and call your doctor if you are having problems. It's also a good idea to know your test results and keep a list of the medicines you take. How can you care for yourself at home? · Be safe with medicines. Take pain medicines exactly as directed. ¨ If the doctor gave you a prescription medicine for pain, take it as prescribed. ¨ If you are not taking a prescription pain medicine, ask your doctor if you can take an over-the-counter medicine. · You may be low in iron because of blood loss. Eat a balanced diet that is high in iron and vitamin C. Foods rich in iron include red meat, shellfish, eggs, beans, and leafy green vegetables. Talk to your doctor about whether you need to take iron pills or a multivitamin. When should you call for help? Call 911 anytime you think you may need emergency care. For example, call if:  · You passed out (lost consciousness). Call your doctor now or seek immediate medical care if:  · You have sudden, severe pain in your belly or pelvis. · You have severe vaginal bleeding. You are soaking through your usual pads or tampons every hour for 2 or more hours. · You feel dizzy or lightheaded, or you feel like you may faint. Watch closely for changes in your health, and be sure to contact your doctor if:  · You have new belly or pelvic pain.   · You have a fever.  · Your bleeding gets worse or lasts longer than 1 week. · You think you may be pregnant. Where can you learn more? Go to http://bethany-nia.info/. Enter Y894 in the search box to learn more about \"Abnormal Uterine Bleeding: Care Instructions. \"  Current as of: October 13, 2016  Content Version: 11.3  © 1234-2045 [employer] / [employer]. Care instructions adapted under license by [employer] (which disclaims liability or warranty for this information). If you have questions about a medical condition or this instruction, always ask your healthcare professional. [place] [address] any warranty or liability for your use of this information. Bacterial Vaginosis: Care Instructions  Your Care Instructions    Bacterial vaginosis is a type of vaginal infection. It is caused by excess growth of certain bacteria that are normally found in the vagina. Symptoms can include itching, swelling, pain when you urinate or have sex, and a gray or yellow discharge with a \"fishy\" odor. It is not considered an infection that is spread through sexual contact. Although symptoms can be annoying and uncomfortable, bacterial vaginosis does not usually cause other health problems. However, if you have it while you are pregnant, it can cause complications. While the infection may go away on its own, most doctors use antibiotics to treat it. You may have been prescribed pills or vaginal cream. With treatment, bacterial vaginosis usually clears up in 5 to 7 days. Follow-up care is a key part of your treatment and safety. Be sure to make and go to all appointments, and call your doctor if you are having problems. It's also a good idea to know your test results and keep a list of the medicines you take. How can you care for yourself at home? · Take your antibiotics as directed. Do not stop taking them just because you feel better.  You need to take the full course of antibiotics. · Do not eat or drink anything that contains alcohol if you are taking metronidazole (Flagyl). · Keep using your medicine if you start your period. Use pads instead of tampons while using a vaginal cream or suppository. Tampons can absorb the medicine. · Wear loose cotton clothing. Do not wear nylon and other materials that hold body heat and moisture close to the skin. · Do not scratch. Relieve itching with a cold pack or a cool bath. · Do not wash your vaginal area more than once a day. Use plain water or a mild, unscented soap. Do not douche. When should you call for help? Watch closely for changes in your health, and be sure to contact your doctor if:  · You have unexpected vaginal bleeding. · You have a fever. · You have new or increased pain in your vagina or pelvis. · You are not getting better after 1 week. · Your symptoms return after you finish the course of your medicine. Where can you learn more? Go to http://betahny-nia.info/. Brooke Oconnell in the search box to learn more about \"Bacterial Vaginosis: Care Instructions. \"  Current as of: October 13, 2016  Content Version: 11.3  © 2407-8113 [employer]. Care instructions adapted under license by [employer] (which disclaims liability or warranty for this information). If you have questions about a medical condition or this instruction, always ask your healthcare professional. [place] [address] any warranty or liability for your use of this information.

## 2017-08-22 NOTE — Clinical Note
Follow up with Ob/gyn for recheck if symptoms persist. 
 
Thank you for allowing us to provide you with medical care today. We realize that you have many choices for your emergency care needs. We thank you for choosing [facility]. Please choose us in the future for any continued health care needs. We hope we addressed all of your medical concerns. We strive to provide excellent quality care in the Emergency Department. Anything less than excellent care does not meet our exp ectations. If a prescription has been provided, please have it filled as soon as possible to avoid a delay in treatment. Read the entire medication instruction sheet provided to you by the pharmacy. If you have any questions or reservations about t aking the medication due to side effects or interactions with other medications please call the ER or your primary care physician. The exam and treatment you received in the Emergency Department were for an emergent problem and is not intended as com plete care. It is important that you follow up with a doctor, nurse practitioner, or  [id number] assistant for ongoing care. If your symptoms worsen or you do not improve as expected and you are unable to reach your usual health care provider, you should  return to the Emergency Department. We are available 24 hours a day. You may be contacted by a [address] for your opinion of this visit. We would appreciate it if you answer \"very satisfied\" to the survey questions. If you are unable to answer  that you are \"very satisfied\" with your visit please contact our nurse manager at [phone] to discuss your concerns. We strive to do the best we can and your opinions are important to us and anything less that \"very satisfied\" is not acceptable to [facility] or [facility].

## 2017-08-22 NOTE — ED NOTES
HOWARD Barcenas has reviewed discharge instructions with the patient. The patient verbalized understanding. Pt discharged with written instructions. No further concerns at this time. Pt given prescriptions and ED excuse note. Patient ambulatory to exit with steady gait.

## 2017-08-22 NOTE — ED NOTES
Patient comes to the ED complaining of lower abdominal pain. Patient states she went to the bathroom last night and noticed her urine to have an orange color. Patient states that she does not have any N/VD. Patient denies having sex within the last month.

## 2017-08-22 NOTE — ED PROVIDER NOTES
HPI Comments: Nataliia Ferrer is a 28 y.o. female presenting ambulatory to ED [facility] c/o abdominal pain since yesterday. Pt notes that yesterday when she went to the bathroom, while wiping, she noticed an orange color. She reports that she is currently on her menstrual cycle so she has vaginal bleeding, and feels like her cramps are more intense than normal. Pt notes that she has had multiple episodes of BV. She reports that her PCP does her yearly pap smears, but she has seen Dr. Joaquin Lorenzana in the past. Pt denies urinary symptoms, fever, chills, coughing, cold symptoms, rhinorrhea, sore throat, any new medications, concern for pregnancy, concern for miscarriage, or concern for STD. PCP: Farhad Hinson MD    There are no other complaints, changes, or physical findings at this time. The history is provided by the patient. No  was used.         Past Medical History:   Diagnosis Date    Asthma     Delivery normal     Depression     Ectopic pregnancy     Miscarriage     Other ill-defined conditions     ectopic pregnancy    Psychiatric disorder     anger        Past Surgical History:   Procedure Laterality Date    ABDOMEN SURGERY PROC UNLISTED      HX CHOLECYSTECTOMY      HX GYN      right oophetrecomy    HX HERNIA REPAIR  12/5/14    Open repair right inguinal hernia with mesh    HX ORTHOPAEDIC      Right arm/leg         Family History:   Problem Relation Age of Onset    Cancer Mother      breast    Hypertension Mother     Thyroid Disease Mother     Sickle Cell Trait Father     Asthma Sister     Asthma Brother     Asthma Sister    [address] [facility] Asthma Sister    [address] [facility] Asthma Sister    [facility] Asthma Sister     Asthma Sister     Asthma Brother     Asthma Brother     Asthma Brother     Asthma Brother     Sickle Cell Anemia Brother     Asthma Brother     Diabetes Paternal Grandmother        Social History     Social History    Marital status:      Spouse name: N/A    Number of children: N/A    Years of education: N/A     Occupational History    Not on file. Social History Main Topics    Smoking status: Former Smoker     Packs/day: 0.25     Types: Cigarettes     Quit date: 04/2016    Smokeless tobacco: Never Used      Comment: social smoker    Alcohol use Yes      Comment: occasionally    Drug use: No    Sexual activity: No     Other Topics Concern    Not on file     Social History Narrative         ALLERGIES: Ibuprofen    Review of Systems   Constitutional: Negative for chills and fever. HENT: Negative for congestion, rhinorrhea and sore throat. Respiratory: Negative for cough and shortness of breath. Cardiovascular: Negative for chest pain and palpitations. Gastrointestinal: Positive for abdominal pain. Negative for diarrhea, nausea and vomiting. Genitourinary: Positive for vaginal bleeding. Negative for dysuria and hematuria. Musculoskeletal: Negative for neck pain and neck stiffness. Skin: Negative for rash and wound. Neurological: Negative for dizziness and headaches. Psychiatric/Behavioral: Negative for agitation and confusion. Patient Vitals for the past 12 hrs:   Temp Pulse Resp BP SpO2   08/22/17 1515 - - 16 127/81 98 %   08/22/17 1328 97.3 °F (36.3 °C) 80 18 (!) 141/110 100 %            Physical Exam   Constitutional: She is oriented to person, place, and time. She appears well-developed and well-nourished. No distress. WDWN female, alert, in NAD   HENT:   Head: Normocephalic and atraumatic. Nose: Nose normal.   Mouth/Throat: Oropharynx is clear and moist. No oropharyngeal exudate. Eyes: Conjunctivae and EOM are normal. Right eye exhibits no discharge. Left eye exhibits no discharge. No scleral icterus. Neck: Normal range of motion. Neck supple. Cardiovascular: Normal rate, regular rhythm and normal heart sounds. Pulmonary/Chest: Effort normal and breath sounds normal. No respiratory distress. She has no wheezes. Abdominal: Soft.  She exhibits no distension. There is no tenderness. There is no rebound and no guarding. No CVAT   Genitourinary: Vaginal discharge found. Genitourinary Comments: See PELVIC exam for details   Musculoskeletal: Normal range of motion. She exhibits no edema. Neurological: She is alert and oriented to person, place, and time. She exhibits normal muscle tone. Coordination normal.   Skin: Skin is warm and dry. She is not diaphoretic. No pallor. Psychiatric: She has a normal mood and affect. Her behavior is normal. Judgment normal.   Nursing note and vitals reviewed. MDM  Number of Diagnoses or Management Options  BV (bacterial vaginosis):   Vaginal bleeding:   Diagnosis management comments: DDx: BV, STD, yeast infection       Amount and/or Complexity of Data Reviewed  Clinical lab tests: ordered and reviewed  Review and summarize past medical records: yes    Patient Progress  Patient progress: stable    ED Course       Pelvic Exam  Date/Time: 8/22/2017 2:50 PM  Performed by: PA  Procedure duration:  15 minutes. Documented by:  Rupert Beckford PA-C. Exam assisted by:  Lowella Litten, RN. Type of exam performed: bimanual and speculum. External genitalia appearance: normal.    Vaginal exam:  discharge and bleeding. Bleeding: mild  The amount of discharge was:  moderate. The discharge was mucousy, thin, mucoid and bloody. Cervical exam:  os closed and no cervical motion tenderness. Specimen(s) collected:  chlamydia and GC. Bimanual exam:  normal.    Patient tolerance: Patient tolerated the procedure well with no immediate complications          Procedure Note - Pelvic Exam:    3:05 PM  Performed by: Lyla Scott  Chaperoned by: Lowella Litten, RN  Pelvic exam was performed using bimanual and speculum. Further findings noted in physical exam.   The procedure took 1-15 minutes, and pt tolerated well. Written by Sandra Garcia ED Scribe, as dictated by Lyla Scott.     LABORATORY TESTS:  Recent Results (from the past 12 hour(s))   URINALYSIS W/ REFLEX CULTURE    Collection Time: 08/22/17  2:35 PM   Result Value Ref Range    Color YELLOW/STRAW      Appearance CLEAR CLEAR      Specific gravity 1.021 1.003 - 1.030      pH (UA) 6.5 5.0 - 8.0      Protein NEGATIVE  NEG mg/dL    Glucose NEGATIVE  NEG mg/dL    Ketone NEGATIVE  NEG mg/dL    Bilirubin NEGATIVE  NEG      Blood MODERATE (A) NEG      Urobilinogen 0.2 0.2 - 1.0 EU/dL    Nitrites NEGATIVE  NEG      Leukocyte Esterase NEGATIVE  NEG      WBC 0-4 0 - 4 /hpf    RBC 5-10 0 - 5 /hpf    Epithelial cells FEW FEW /lpf    Bacteria NEGATIVE  NEG /hpf    UA:UC IF INDICATED CULTURE NOT INDICATED BY UA RESULT CNI      Hyaline cast 0-2 0 - 5 /lpf   HCG URINE, QL    Collection Time: 08/22/17  2:35 PM   Result Value Ref Range    HCG urine, Ql. NEGATIVE  NEG     KOH, OTHER SOURCES    Collection Time: 08/22/17  3:02 PM   Result Value Ref Range    Special Requests: NO SPECIAL REQUESTS      KOH NO YEAST SEEN     WET PREP    Collection Time: 08/22/17  3:02 PM   Result Value Ref Range    Clue cells CLUE CELLS PRESENT      Wet prep NO TRICHOMONAS SEEN         MEDICATIONS GIVEN:  Medications   oxyCODONE-acetaminophen (PERCOCET) 5-325 mg per tablet 1 Tab (not administered)       IMPRESSION:  1. Vaginal bleeding    2. BV (bacterial vaginosis)        PLAN:  1. Current Discharge Medication List      START taking these medications    Details   HYDROcodone-acetaminophen (NORCO) 5-325 mg per tablet Take 1 Tab by mouth every six (6) hours as needed for Pain for up to 10 doses. Max Daily Amount: 4 Tabs. Qty: 10 Tab, Refills: 0      metroNIDAZOLE (FLAGYL) 500 mg tablet Take 1 Tab by mouth two (2) times a day for 7 days. Qty: 14 Tab, Refills: 0           2.    Follow-up Information     Follow up With Details Comments Oly Johnson MD  As needed [facility]  [phone]      [facility] EMERGENCY DEPT  If symptoms worsen [address] [facility]  [phone]        Return to ED if worse     DISCHARGE NOTE:  4:22 PM  The patient is ready for discharge. The patient's signs, symptoms, diagnosis, and discharge instructions have been discussed and the patient has conveyed their understanding. The patient is to follow up as recommended or return to the ER should their symptoms worsen. Plan has been discussed and the patient is in agreement. This note is prepared by Silva Cuba, acting as Scribe for [employer]: The scribe's documentation has been prepared under my direction and personally reviewed by me in its entirety. I confirm that the note above accurately reflects all work, treatment, procedures, and medical decision making performed by me.

## 2017-08-23 LAB
C TRACH DNA SPEC QL NAA+PROBE: NEGATIVE
N GONORRHOEA DNA SPEC QL NAA+PROBE: NEGATIVE
SAMPLE TYPE: NORMAL
SERVICE CMNT-IMP: NORMAL
SPECIMEN SOURCE: NORMAL

## 2017-10-06 ENCOUNTER — HOSPITAL ENCOUNTER (EMERGENCY)
Age: 32
Discharge: HOME | End: 2017-10-06
Attending: EMERGENCY MEDICINE | Admitting: EMERGENCY MEDICINE
Payer: MEDICAID

## 2017-10-06 ENCOUNTER — APPOINTMENT (OUTPATIENT)
Dept: GENERAL RADIOLOGY | Age: 32
End: 2017-10-06
Attending: PHYSICIAN ASSISTANT
Payer: MEDICAID

## 2017-10-06 VITALS
DIASTOLIC BLOOD PRESSURE: 82 MMHG | BODY MASS INDEX: 25.76 KG/M2 | SYSTOLIC BLOOD PRESSURE: 137 MMHG | RESPIRATION RATE: 16 BRPM | WEIGHT: 170 LBS | HEART RATE: 90 BPM | OXYGEN SATURATION: 99 % | HEIGHT: 68 IN | TEMPERATURE: 98.7 F

## 2017-10-06 DIAGNOSIS — J06.9 ACUTE UPPER RESPIRATORY INFECTION: ICD-10-CM

## 2017-10-06 DIAGNOSIS — J20.9 ACUTE BRONCHITIS, UNSPECIFIED ORGANISM: Primary | ICD-10-CM

## 2017-10-06 PROCEDURE — 99282 EMERGENCY DEPT VISIT SF MDM: CPT

## 2017-10-06 PROCEDURE — 71020 XR CHEST PA LAT: CPT

## 2017-10-06 RX ORDER — AZITHROMYCIN 250 MG/1
TABLET, FILM COATED ORAL
Qty: 6 TAB | Refills: 0 | Status: SHIPPED | OUTPATIENT
Start: 2017-10-06 | End: 2017-11-14

## 2017-10-06 RX ORDER — BENZONATATE 200 MG/1
200 CAPSULE ORAL
Qty: 20 CAP | Refills: 0 | Status: SHIPPED | OUTPATIENT
Start: 2017-10-06 | End: 2017-10-13

## 2017-10-06 RX ORDER — LORATADINE 10 MG/1
10 TABLET ORAL DAILY
Qty: 10 TAB | Refills: 0 | Status: SHIPPED | OUTPATIENT
Start: 2017-10-06 | End: 2018-03-24

## 2017-10-06 NOTE — DISCHARGE INSTRUCTIONS
Bronchitis: Care Instructions  Your Care Instructions    Bronchitis is inflammation of the bronchial tubes, which carry air to the lungs. The tubes swell and produce mucus, or phlegm. The mucus and inflamed bronchial tubes make you cough. You may have trouble breathing. Most cases of bronchitis are caused by viruses like those that cause colds. Antibiotics usually do not help and they may be harmful. Bronchitis usually develops rapidly and lasts about 2 to 3 weeks in otherwise healthy people. Follow-up care is a key part of your treatment and safety. Be sure to make and go to all appointments, and call your doctor if you are having problems. It's also a good idea to know your test results and keep a list of the medicines you take. How can you care for yourself at home? · Take all medicines exactly as prescribed. Call your doctor if you think you are having a problem with your medicine. · Get some extra rest.  · Take an over-the-counter pain medicine, such as acetaminophen (Tylenol), ibuprofen (Advil, Motrin), or naproxen (Aleve) to reduce fever and relieve body aches. Read and follow all instructions on the label. · Do not take two or more pain medicines at the same time unless the doctor told you to. Many pain medicines have acetaminophen, which is Tylenol. Too much acetaminophen (Tylenol) can be harmful. · Take an over-the-counter cough medicine that contains dextromethorphan to help quiet a dry, hacking cough so that you can sleep. Avoid cough medicines that have more than one active ingredient. Read and follow all instructions on the label. · Breathe moist air from a humidifier, hot shower, or sink filled with hot water. The heat and moisture will thin mucus so you can cough it out. · Do not smoke. Smoking can make bronchitis worse. If you need help quitting, talk to your doctor about stop-smoking programs and medicines. These can increase your chances of quitting for good.   When should you call for help? Call 911 anytime you think you may need emergency care. For example, call if:  · You have severe trouble breathing. Call your doctor now or seek immediate medical care if:  · You have new or worse trouble breathing. · You cough up dark brown or bloody mucus (sputum). · You have a new or higher fever. · You have a new rash. Watch closely for changes in your health, and be sure to contact your doctor if:  · You cough more deeply or more often, especially if you notice more mucus or a change in the color of your mucus. · You are not getting better as expected. Where can you learn more? Go to http://bethany-nia.info/. Enter H333 in the search box to learn more about \"Bronchitis: Care Instructions. \"  Current as of: March 25, 2017  Content Version: 11.3  © 5610-7236 [employer]. Care instructions adapted under license by [employer] (which disclaims liability or warranty for this information). If you have questions about a medical condition or this instruction, always ask your healthcare professional. [place] [address] any warranty or liability for your use of this information. Upper Respiratory Infection (Cold): Care Instructions  Your Care Instructions    An upper respiratory infection, or URI, is an infection of the nose, sinuses, or throat. URIs are spread by coughs, sneezes, and direct contact. The common cold is the most frequent kind of URI. The flu and sinus infections are other kinds of URIs. Almost all URIs are caused by viruses. Antibiotics won't cure them. But you can treat most infections with home care. This may include drinking lots of fluids and taking over-the-counter pain medicine. You will probably feel better in 4 to 10 days. The doctor has checked you carefully, but problems can develop later. If you notice any problems or new symptoms, get medical treatment right away.   Follow-up care is a key part of your treatment and safety. Be sure to make and go to all appointments, and call your doctor if you are having problems. It's also a good idea to know your test results and keep a list of the medicines you take. How can you care for yourself at home? · To prevent dehydration, drink plenty of fluids, enough so that your urine is light yellow or clear like water. Choose water and other caffeine-free clear liquids until you feel better. If you have kidney, heart, or liver disease and have to limit fluids, talk with your doctor before you increase the amount of fluids you drink. · Take an over-the-counter pain medicine, such as acetaminophen (Tylenol), ibuprofen (Advil, Motrin), or naproxen (Aleve). Read and follow all instructions on the label. · Before you use cough and cold medicines, check the label. These medicines may not be safe for young children or for people with certain health problems. · Be careful when taking over-the-counter cold or flu medicines and Tylenol at the same time. Many of these medicines have acetaminophen, which is Tylenol. Read the labels to make sure that you are not taking more than the recommended dose. Too much acetaminophen (Tylenol) can be harmful. · Get plenty of rest.  · Do not smoke or allow others to smoke around you. If you need help quitting, talk to your doctor about stop-smoking programs and medicines. These can increase your chances of quitting for good. When should you call for help? Call 911 anytime you think you may need emergency care. For example, call if:  · You have severe trouble breathing. Call your doctor now or seek immediate medical care if:  · You seem to be getting much sicker. · You have new or worse trouble breathing. · You have a new or higher fever. · You have a new rash. Watch closely for changes in your health, and be sure to contact your doctor if:  · You have a new symptom, such as a sore throat, an earache, or sinus pain.   · You cough more deeply or more often, especially if you notice more mucus or a change in the color of your mucus. · You do not get better as expected. Where can you learn more? Go to http://bethany-nia.info/. Enter I735 in the search box to learn more about \"Upper Respiratory Infection (Cold): Care Instructions. \"  Current as of: March 25, 2017  Content Version: 11.3  © 3465-6262 [employer] Care instructions adapted under license by [employer] (which disclaims liability or warranty for this information). If you have questions about a medical condition or this instruction, always ask your healthcare professional. [address] any warranty or liability for your use of this information.

## 2017-10-06 NOTE — ED NOTES
Emergency Department Nursing Plan of Care       The Nursing Plan of Care is developed from the Nursing assessment and Emergency Department Attending provider initial evaluation. The plan of care may be reviewed in the ED Provider note.     The Plan of Care was developed with the following considerations:   Patient / Family readiness to learn indicated by:verbalized understanding and appropriate questions asked  Persons(s) to be included in education: patient  Barriers to Learning/Limitations:No    Signed     Agustina Carlson    10/6/2017   4:41 PM

## 2017-10-06 NOTE — ED PROVIDER NOTES
Patient is a 28 y.o. female presenting with cough. The history is provided by the patient. Cough   This is a new problem. Episode onset: 5 days. The problem occurs constantly. The problem has not changed since onset. The cough is non-productive. There has been no fever. Associated symptoms include rhinorrhea and sore throat. Pertinent negatives include no myalgias, no shortness of breath, no wheezing, no nausea and no vomiting. Associated symptoms comments: 7/10 pain. She is not a smoker. Her past medical history is significant for asthma. Past medical history comments: depression. Past Medical History:   Diagnosis Date    Asthma     Delivery normal     Depression     Ectopic pregnancy     Miscarriage     Other ill-defined conditions(799.89)     ectopic pregnancy    Psychiatric disorder     anger        Past Surgical History:   Procedure Laterality Date    ABDOMEN SURGERY PROC UNLISTED      HX CHOLECYSTECTOMY      HX GYN      right oophetrecomy    HX HERNIA REPAIR  12/5/14    Open repair right inguinal hernia with mesh    HX ORTHOPAEDIC      Right arm/leg         Family History:   Problem Relation Age of Onset    Cancer Mother      breast    Hypertension Mother     Thyroid Disease Mother     Sickle Cell Trait Father     Asthma Sister     Asthma Brother     Asthma Sister     Asthma Sister     Asthma Sister     Asthma Sister     Asthma Sister     Asthma Brother     Asthma Brother     Asthma Brother     Asthma Brother     Sickle Cell Anemia Brother     Asthma Brother     Diabetes Paternal Grandmother        Social History     Social History    Marital status:      Spouse name: N/A    Number of children: N/A    Years of education: N/A     Occupational History    Not on file.      Social History Main Topics    Smoking status: Former Smoker     Packs/day: 0.25     Types: Cigarettes     Quit date: 04/2016    Smokeless tobacco: Never Used      Comment: social smoker    Alcohol use Yes      Comment: occasionally    Drug use: No    Sexual activity: No     Other Topics Concern    Not on file     Social History Narrative         ALLERGIES: Ibuprofen    Review of Systems   Constitutional: Negative for fever. HENT: Positive for congestion, rhinorrhea and sore throat. Respiratory: Positive for cough. Negative for shortness of breath, wheezing and stridor. Gastrointestinal: Negative for nausea and vomiting. Musculoskeletal: Negative. Negative for myalgias. Neurological: Negative for speech difficulty. All other systems reviewed and are negative. Vitals:    10/06/17 1614   BP: 137/82   Pulse: 90   Resp: 16   Temp: 98.7 °F (37.1 °C)   SpO2: 99%   Weight: 77.1 kg (170 lb)   Height: 5' 8\" (1.727 m)            Physical Exam   Constitutional: She is oriented to person, place, and time. She appears well-developed and well-nourished. No distress. HENT:   Head: Normocephalic and atraumatic. Mouth/Throat: Oropharynx is clear and moist. No oropharyngeal exudate. Eyes: Conjunctivae are normal.   Cardiovascular: Normal rate, regular rhythm and normal heart sounds. Pulmonary/Chest: Effort normal and breath sounds normal. No respiratory distress. She has no wheezes. She has no rales. Neurological: She is alert and oriented to person, place, and time. Skin: Skin is warm and dry. Psychiatric: She has a normal mood and affect. Her behavior is normal. Judgment and thought content normal.   Nursing note and vitals reviewed.        MDM  Number of Diagnoses or Management Options  Acute bronchitis, unspecified organism:   Acute upper respiratory infection:   Diagnosis management comments: DDX: URI, bronchitis, asthma, PNA       Amount and/or Complexity of Data Reviewed  Tests in the radiology section of CPT®: ordered and reviewed      ED Course       Procedures    MEDICATIONS GIVEN:  Medications - No data to display    LABS REVIEWED:  No results found for this or any previous visit (from the past 24 hour(s)). VITAL SIGNS:  Patient Vitals for the past 12 hrs:   Temp Pulse Resp BP SpO2   10/06/17 1614 98.7 °F (37.1 °C) 90 16 137/82 99 %       RADIOLOGY RESULTS:  The following have been ordered and reviewed:  XR CHEST PA LAT   Final Result        Study Result      EXAM:  XR CHEST PA LAT     INDICATION:  Productive cough, nasal congestion, and generalized body aches for  5 days.     COMPARISON: Chest views on 3/22/2016     TECHNIQUE: PA and lateral chest digital views     FINDINGS: The cardiomediastinal and hilar contours are within normal limits. The  pulmonary vasculature is within normal limits.      The lungs and pleural spaces are clear. The visualized bones and upper abdomen  are age-appropriate.     IMPRESSION  IMPRESSION:     Normal PA and lateral chest views. No change.            PROGRESS NOTES:  A/P  5:12 PM  Reviewed labs and/or imaging results with pt. The patient's signs, symptoms, diagnosis, and discharge instruction have been discussed and the patient has conveyed their understanding. The patient is to follow up with PCP prn or return to the ER should their symptoms worsen. Plan has been discussed and the patient is in agreement. Pt is ready for discharge          DIAGNOSIS:    1. Acute bronchitis, unspecified organism    2.  Acute upper respiratory infection        PLAN:  Follow-up Information     Follow up With Details Comments Contact Lexi Brasher MD Schedule an appointment as soon as possible for a visit on 10/9/2017 As needed [address] [place] [address] - [place] EMERGENCY DEPT  If symptoms worsen [address] [facility]        Current Discharge Medication List      START taking these medications    Details   azithromycin (ZITHROMAX) 250 mg tablet Take two tablets today then one tablet daily  Qty: 6 Tab, Refills: 0      benzonatate (TESSALON) 200 mg capsule Take 1 Cap by mouth three (3) times daily as needed for Cough for up to 7 days. Qty: 20 Cap, Refills: 0      loratadine (CLARITIN) 10 mg tablet Take 1 Tab by mouth daily. Qty: 10 Tab, Refills: 0         CONTINUE these medications which have NOT CHANGED    Details   albuterol (PROVENTIL HFA, VENTOLIN HFA) 90 mcg/actuation inhaler Take 2 Puffs by inhalation every four (4) hours as needed for Wheezing.   Qty: 1 Inhaler, Refills: 0

## 2017-11-14 ENCOUNTER — APPOINTMENT (OUTPATIENT)
Dept: ULTRASOUND IMAGING | Age: 32
End: 2017-11-14
Attending: PHYSICIAN ASSISTANT
Payer: MEDICAID

## 2017-11-14 ENCOUNTER — HOSPITAL ENCOUNTER (EMERGENCY)
Age: 32
Discharge: HOME | End: 2017-11-14
Attending: EMERGENCY MEDICINE
Payer: MEDICAID

## 2017-11-14 VITALS
HEART RATE: 86 BPM | BODY MASS INDEX: 25.96 KG/M2 | HEIGHT: 68 IN | OXYGEN SATURATION: 99 % | SYSTOLIC BLOOD PRESSURE: 126 MMHG | RESPIRATION RATE: 16 BRPM | DIASTOLIC BLOOD PRESSURE: 84 MMHG | TEMPERATURE: 98.1 F | WEIGHT: 171.3 LBS

## 2017-11-14 DIAGNOSIS — M79.652 PAIN OF LEFT THIGH: Primary | ICD-10-CM

## 2017-11-14 PROCEDURE — 74011250637 HC RX REV CODE- 250/637: Performed by: PHYSICIAN ASSISTANT

## 2017-11-14 PROCEDURE — 93971 EXTREMITY STUDY: CPT

## 2017-11-14 PROCEDURE — 99283 EMERGENCY DEPT VISIT LOW MDM: CPT

## 2017-11-14 RX ORDER — TRAMADOL HYDROCHLORIDE 50 MG/1
50 TABLET ORAL
Qty: 20 TAB | Refills: 0 | Status: SHIPPED | OUTPATIENT
Start: 2017-11-14 | End: 2018-03-24

## 2017-11-14 RX ORDER — ACETAMINOPHEN 325 MG/1
650 TABLET ORAL
Status: COMPLETED | OUTPATIENT
Start: 2017-11-14 | End: 2017-11-14

## 2017-11-14 RX ADMIN — ACETAMINOPHEN 650 MG: 325 TABLET ORAL at 13:07

## 2017-11-14 NOTE — ED TRIAGE NOTES
Pt. Presents to ED today for complaints of L leg pain that has been present for about a month. Pt. States the pain \"shoots down the front of the left leg. \"  Pt. Alert and oriented x4. Pt. Placed in position of comfort with call bell in reach.

## 2017-11-14 NOTE — ED NOTES
DORI Douglass reviewed discharge instructions with the patient. The patient verbalized understanding.   Ambulatory on discharge. '

## 2017-11-14 NOTE — ED PROVIDER NOTES
[facility].  EMERGENCY DEPARTMENT HISTORY AND PHYSICAL EXAM         Date of Service: 11/14/2017   Patient Name: Lisandro Zarate   YOB: 1985  Medical Record Number: [id number]    History of Presenting Illness     Chief Complaint   Patient presents with    Leg Pain     pt c/o intermittent sharp pain running from left hip to left calf x2 months. History Provided By:  patient    Additional History:   Lisandro Zarate is a 33 y.o. female with no significant PMHx who presents ambulatory to the ED with cc of intermittent, sharp, 8/10 pain shooting from her left hip to her left calf for the past 1-2 months. Pt states she has taken Tylenol and BC's with no relief of the pain. She denies any recent falls or injuries, or any headache, palpitations, chest pain, shortness, or breath, nausea, vomiting, diarrhea, hematuria, saddle anesthesia, numbness, weakness, recent long travel, hormone replacement therapy, or recent surgery. She called her PCP and the earliest appointment available is in January. Social Hx: former Tobacco, occasional EtOH, - Illicit Drugs    There are no other complaints, changes or physical findings at this time.     Primary Care Provider: Rambo Tompkins MD   Specialist:    Past History     Past Medical History:   Past Medical History:   Diagnosis Date    Asthma     Delivery normal     Depression     Ectopic pregnancy     Miscarriage     Other ill-defined conditions(799.89)     ectopic pregnancy    Psychiatric disorder     anger         Past Surgical History:   Past Surgical History:   Procedure Laterality Date    ABDOMEN SURGERY PROC UNLISTED      HX CHOLECYSTECTOMY      HX GYN      right oophetrecomy    HX HERNIA REPAIR  12/5/14    Open repair right inguinal hernia with mesh    HX ORTHOPAEDIC      Right arm/leg        Family History:   Family History   Problem Relation Age of Onset    Cancer Mother      breast    Hypertension Mother  Thyroid Disease Mother     Sickle Cell Trait Father     Asthma Sister     Asthma Brother     Asthma Sister     Asthma Sister     Asthma Sister     Asthma Sister     Asthma Sister     Asthma Brother     Asthma Brother     Asthma Brother     Asthma Brother     Sickle Cell Anemia Brother     Asthma Brother     Diabetes Paternal Grandmother         Social History:   Social History   Substance Use Topics    Smoking status: Former Smoker     Packs/day: 0.25     Types: Cigarettes     Quit date: 04/2016    Smokeless tobacco: Never Used      Comment: social smoker    Alcohol use Yes      Comment: occasionally        Allergies: Allergies   Allergen Reactions    Ibuprofen Hives and Itching        Review of Systems   Review of Systems   Constitutional: Negative. Negative for fever. HENT: Negative for congestion. Eyes: Negative. Respiratory: Negative. Negative for cough. Cardiovascular: Negative. Negative for chest pain and palpitations. Gastrointestinal: Negative. Negative for abdominal pain, diarrhea, nausea and vomiting. Genitourinary: Negative. Negative for dysuria and hematuria. Musculoskeletal: Positive for myalgias (left leg pain). Skin: Negative. Neurological: Negative. Negative for dizziness, weakness, numbness and headaches. All other systems reviewed and are negative. Physical Exam  Physical Exam   Constitutional: She is oriented to person, place, and time. She appears well-developed and well-nourished. No distress. 34 yo -American female   HENT:   Head: Normocephalic and atraumatic. Eyes: Conjunctivae are normal. Right eye exhibits no discharge. Left eye exhibits no discharge. Neck: Normal range of motion. Neck supple. Cardiovascular: Normal rate, regular rhythm, normal heart sounds and intact distal pulses. No murmur heard. Pulmonary/Chest: Effort normal and breath sounds normal. No respiratory distress. She has no wheezes. Musculoskeletal:   BACK: Normal spinal curvatures. No step off or deformity. NT to palpation of the back or the left leg. No swelling, ecchymosis or deformity of the leg. Negative seated SLR bilaterally. Strength of the LE 5/5 and equal bilaterally. Ambulatory without difficulty. Lymphadenopathy:     She has no cervical adenopathy. Neurological: She is alert and oriented to person, place, and time. Skin: Skin is warm and dry. She is not diaphoretic. Psychiatric: She has a normal mood and affect. Her behavior is normal.   Nursing note and vitals reviewed. Medical Decision Making   I am the first provider for this patient. I reviewed the vital signs, available nursing notes, past medical history, past surgical history, family history and social history. Old Medical Records: Old medical records. Provider Notes:   DDx: sciatica, DVT, chronic leg pain, sprain, strain      ED Course:  11:06 AM   Initial assessment performed. The patients presenting problems have been discussed, and they are in agreement with the care plan formulated and outlined with them. I have encouraged them to ask questions as they arise throughout their visit. 11:22 AM  Patient is requesting an ultrasound of her leg. Diagnostic Study Results   Radiologic Studies -  The following have been ordered and reviewed:  Study Result      INDICATION:  Left lower extremity swelling and pain, DVT suspected     COMPARISON: None.     FINDINGS: Duplex Doppler sonography of the left lower extremity was performed  from the groin to the calf. The left common femoral, femoral and popliteal veins  are compressible with normal color-flow and wave forms and response to  physiologic maneuvers including Valsalva and augmentation.     IMPRESSION  IMPRESSION:  No deep venous thrombosis identified. Vital Signs-Reviewed the patient's vital signs.    Patient Vitals for the past 12 hrs:   Temp Pulse Resp BP SpO2   11/14/17 1002 98.1 °F (36.7 °C) 86 16 126/84 99 %       Medications Given in the ED:  Medications   acetaminophen (TYLENOL) tablet 650 mg (650 mg Oral Given 11/14/17 1307)       Diagnosis:  Clinical Impression:   1. Pain of left thigh         Plan:  1:   Follow-up Information     Follow up With Details Comments Contact Carmen Reinoso MD In 1 week  [address]  [phone]            2:   Discharge Medication List as of 11/14/2017  2:22 PM      START taking these medications    Details   traMADol (ULTRAM) 50 mg tablet Take 1 Tab by mouth every six (6) hours as needed for Pain. Max Daily Amount: 200 mg., Print, Disp-20 Tab, R-0         CONTINUE these medications which have NOT CHANGED    Details   loratadine (CLARITIN) 10 mg tablet Take 1 Tab by mouth daily. , Normal, Disp-10 Tab, R-0      albuterol (PROVENTIL HFA, VENTOLIN HFA) 90 mcg/actuation inhaler Take 2 Puffs by inhalation every four (4) hours as needed for Wheezing., Print, Disp-1 Inhaler, R-0           Return to ED if worse. Disposition:    DISCHARGE NOTE  2:40 PM  The patient has been re-evaluated and is ready for discharge. Reviewed available results with patient. Counseled pt on diagnosis and care plan. Pt has expressed understanding, and all questions have been answered. Pt agrees with plan and agrees to follow up as recommended, or return to the ED if their symptoms worsen. Discharge instructions have been provided and explained to the pt, along with reasons to return to the ED. Attestations: This note is prepared by Levander Boas. Alexandre Pringle, acting as Scribe for DORI [facility]: The scribe's documentation has been prepared under my direction and personally reviewed by me in its entirety. I confirm that the note above accurately reflects all work, treatment, procedures, and medical decision making performed by me.

## 2017-11-14 NOTE — DISCHARGE INSTRUCTIONS
Leg Pain: Care Instructions  Your Care Instructions  Many things can cause leg pain. Too much exercise or overuse can cause a muscle cramp (or charley horse). You can get leg cramps from not eating a balanced diet that has enough potassium, calcium, and other minerals. If you do not drink enough fluids or are taking certain medicines, you may develop leg cramps. Other causes of leg pain include injuries, blood flow problems, nerve damage, and twisted and enlarged veins (varicose veins). You can usually ease pain with self-care. Your doctor may recommend that you rest your leg and keep it elevated. Follow-up care is a key part of your treatment and safety. Be sure to make and go to all appointments, and call your doctor if you are having problems. It's also a good idea to know your test results and keep a list of the medicines you take. How can you care for yourself at home? · Take pain medicines exactly as directed. ¨ If the doctor gave you a prescription medicine for pain, take it as prescribed. ¨ If you are not taking a prescription pain medicine, ask your doctor if you can take an over-the-counter medicine. · Take any other medicines exactly as prescribed. Call your doctor if you think you are having a problem with your medicine. · Rest your leg while you have pain, and avoid standing for long periods of time. · Prop up your leg at or above the level of your heart when possible. · Make sure you are eating a balanced diet that is rich in calcium, potassium, and magnesium, especially if you are pregnant. · If directed by your doctor, put ice or a cold pack on the area for 10 to 20 minutes at a time. Put a thin cloth between the ice and your skin. · Your leg may be in a splint, a brace, or an elastic bandage, and you may have crutches to help you walk. Follow your doctor's directions about how long to wear supports and how to use the crutches. When should you call for help?   Call 911 anytime you think you may need emergency care. For example, call if:  ? · You have sudden chest pain and shortness of breath, or you cough up blood. ? · Your leg is cool or pale or changes color. ?Call your doctor now or seek immediate medical care if:  ? · You have increasing or severe pain. ? · Your leg suddenly feels weak and you cannot move it. ? · You have signs of a blood clot, such as:  ¨ Pain in your calf, back of the knee, thigh, or groin. ¨ Redness and swelling in your leg or groin. ? · You have signs of infection, such as:  ¨ Increased pain, swelling, warmth, or redness. ¨ Red streaks leading from the sore area. ¨ Pus draining from a place on your leg. ¨ A fever. ? · You cannot bear weight on your leg. ? Watch closely for changes in your health, and be sure to contact your doctor if:  ? · You do not get better as expected. Where can you learn more? Go to http://bethany-nia.info/. Enter R255 in the search box to learn more about \"Leg Pain: Care Instructions. \"  Current as of: March 20, 2017  Content Version: 11.4  © 3938-1377 [employer]. Care instructions adapted under license by [employer] (which disclaims liability or warranty for this information). If you have questions about a medical condition or this instruction, always ask your healthcare professional. [place] [address] any warranty or liability for your use of this information.

## 2018-03-24 ENCOUNTER — HOSPITAL ENCOUNTER (EMERGENCY)
Age: 33
Discharge: HOME | End: 2018-03-24
Attending: EMERGENCY MEDICINE
Payer: MEDICAID

## 2018-03-24 VITALS
RESPIRATION RATE: 14 BRPM | HEIGHT: 68 IN | OXYGEN SATURATION: 99 % | TEMPERATURE: 97.6 F | BODY MASS INDEX: 25.69 KG/M2 | SYSTOLIC BLOOD PRESSURE: 137 MMHG | WEIGHT: 169.53 LBS | HEART RATE: 88 BPM | DIASTOLIC BLOOD PRESSURE: 89 MMHG

## 2018-03-24 DIAGNOSIS — J06.9 VIRAL URI WITH COUGH: Primary | ICD-10-CM

## 2018-03-24 DIAGNOSIS — Z87.09 HISTORY OF ASTHMA: ICD-10-CM

## 2018-03-24 PROCEDURE — 99282 EMERGENCY DEPT VISIT SF MDM: CPT

## 2018-03-24 RX ORDER — ALBUTEROL SULFATE 0.83 MG/ML
2.5 SOLUTION RESPIRATORY (INHALATION) ONCE
COMMUNITY
End: 2018-05-04

## 2018-03-24 RX ORDER — PREDNISONE 10 MG/1
TABLET ORAL
Qty: 21 TAB | Refills: 0 | Status: SHIPPED | OUTPATIENT
Start: 2018-03-24 | End: 2018-05-04

## 2018-03-24 RX ORDER — ALBUTEROL SULFATE 0.83 MG/ML
2.5 SOLUTION RESPIRATORY (INHALATION)
Qty: 24 EACH | Refills: 0 | OUTPATIENT
Start: 2018-03-24 | End: 2019-11-08

## 2018-03-24 RX ORDER — ALBUTEROL SULFATE 90 UG/1
2 AEROSOL, METERED RESPIRATORY (INHALATION)
Qty: 1 INHALER | Refills: 1 | OUTPATIENT
Start: 2018-03-24 | End: 2019-11-08

## 2018-03-24 NOTE — ED NOTES
Received patient to exam room, sitting in a chair in a position of comfort, call bell within reach; pt reports worsening productive cough, sore throat, ear pain, tinnitus, nasal congestion x 1 wk

## 2018-03-24 NOTE — ED PROVIDER NOTES
EMERGENCY DEPARTMENT HISTORY AND PHYSICAL EXAM      Date: 3/24/2018  Patient Name: Padmini Reece    History of Presenting Illness     Chief Complaint   Patient presents with    Sore Throat     The patient presents to the ED with complaints of sore throat, cough and congestion that started yesterday.  Cough    Nasal Congestion       History Provided By: Patient    HPI: Padmini Reece, 33 y.o. female with PMHx significant for asthma, presents herself to the ED with cc of progressively worsening cough w/ green sputum, sore throat, tinnitus, and nasal congestion x 1 week. Pt reports of associated ear pain since onset of symptoms. She notes using a nebulizer for improvement of symptoms but found very little relief. Pt was not in contact with sick individuals as of late. She denies being on oral steroids or antibiotics recently. Pt specifically denies fever. PCP: Korina Melendez MD    There are no other complaints, changes, or physical findings at this time. Current Outpatient Prescriptions   Medication Sig Dispense Refill    albuterol (PROVENTIL VENTOLIN) 2.5 mg /3 mL (0.083 %) nebulizer solution 2.5 mg by Nebulization route once.  predniSONE (STERAPRED DS) 10 mg dose pack Per Dose Pack instructions 21 Tab 0    albuterol (PROVENTIL HFA, VENTOLIN HFA, PROAIR HFA) 90 mcg/actuation inhaler Take 2 Puffs by inhalation every four (4) hours as needed for Wheezing. 1 Inhaler 1    albuterol (PROVENTIL VENTOLIN) 2.5 mg /3 mL (0.083 %) nebulizer solution 3 mL by Nebulization route every four (4) hours as needed for Wheezing. 24 Each 0    dextromethorphan-guaiFENesin (ROBITUSSIN-DM)  mg/5 mL syrup Take 10 mL by mouth every six (6) hours as needed for Cough.  1 Bottle 0       Past History     Past Medical History:  Past Medical History:   Diagnosis Date    Asthma     Delivery normal     Depression     Ectopic pregnancy     Miscarriage     Other ill-defined conditions(787.41)     ectopic pregnancy  Psychiatric disorder     anger        Past Surgical History:  Past Surgical History:   Procedure Laterality Date    ABDOMEN SURGERY PROC UNLISTED      HX CHOLECYSTECTOMY      HX GYN      right oophetrecomy    HX HERNIA REPAIR  12/5/14    Open repair right inguinal hernia with mesh    HX ORTHOPAEDIC      Right arm/leg       Family History:  Family History   Problem Relation Age of Onset    Cancer Mother      breast    Hypertension Mother     Thyroid Disease Mother     Sickle Cell Trait Father     Asthma Sister     Asthma Brother     Asthma Sister     Asthma Sister     Asthma Sister     Asthma Sister     Asthma Sister     Asthma Brother     Asthma Brother     Asthma Brother     Asthma Brother     Sickle Cell Anemia Brother     Asthma Brother     Diabetes Paternal Grandmother        Social History:  Social History   Substance Use Topics    Smoking status: Former Smoker     Packs/day: 0.25     Types: Cigarettes     Quit date: 04/2016    Smokeless tobacco: Never Used      Comment: social smoker    Alcohol use Yes      Comment: occasionally       Allergies: Allergies   Allergen Reactions    Ibuprofen Hives and Itching         Review of Systems   Review of Systems   Constitutional: Negative for fatigue and fever. HENT: Positive for congestion, ear pain, sore throat and tinnitus. Eyes: Negative for pain, redness and visual disturbance. Respiratory: Positive for cough (w/ green sputum). Negative for shortness of breath. Cardiovascular: Negative for chest pain and palpitations. Gastrointestinal: Negative for abdominal pain, nausea and vomiting. Genitourinary: Negative for dysuria, frequency and urgency. Musculoskeletal: Negative for back pain, gait problem, neck pain and neck stiffness. Skin: Negative for rash and wound. Neurological: Negative for dizziness, weakness, light-headedness, numbness and headaches.        Physical Exam   Physical Exam   Constitutional: She is oriented to person, place, and time. She appears well-developed and well-nourished. Non-toxic appearance. No distress. HENT:   Head: Normocephalic and atraumatic. Right Ear: External ear normal.   Left Ear: External ear normal.   Nose: Nose normal.   Mouth/Throat: Uvula is midline. No trismus in the jaw. No oropharyngeal exudate or posterior oropharyngeal erythema. Tympanic membranes translucent. Canals are unobstructed. Tonsils are flat. Eyes: Conjunctivae and EOM are normal. Pupils are equal, round, and reactive to light. No scleral icterus. Neck: Normal range of motion and full passive range of motion without pain. Cardiovascular: Normal rate and regular rhythm. Pulmonary/Chest: Effort normal. No accessory muscle usage. No tachypnea. No respiratory distress. She has no decreased breath sounds. She has no wheezes. Abdominal: Soft. There is no tenderness. Musculoskeletal: Normal range of motion. Neurological: She is alert and oriented to person, place, and time. She is not disoriented. No cranial nerve deficit. GCS eye subscore is 4. GCS verbal subscore is 5. GCS motor subscore is 6. Skin: Skin is intact. No rash noted. Psychiatric: She has a normal mood and affect. Her speech is normal.   Nursing note and vitals reviewed. Medical Decision Making   I am the first provider for this patient. I reviewed the vital signs, available nursing notes, past medical history, past surgical history, family history and social history. Vital Signs-Reviewed the patient's vital signs. Patient Vitals for the past 12 hrs:   Temp Pulse Resp BP SpO2   03/24/18 0902 97.6 °F (36.4 °C) 88 14 137/89 99 %     Records Reviewed: Nursing Notes and Old Medical Records    Provider Notes (Medical Decision Making): Afebrile. Well appearing. Well hydrated. Non toxic appearance. Constellation of symptoms suggest viral URI. Plan as below. ED Course:   Initial assessment performed.  The patients presenting problems have been discussed, and they are in agreement with the care plan formulated and outlined with them. I have encouraged them to ask questions as they arise throughout their visit. Discharge Note:  9:17 AM  The pt is ready for discharge. The pt's signs, symptoms, diagnosis, and discharge instructions have been discussed and pt has conveyed their understanding. The pt is to follow up as recommended or return to ER should their symptoms worsen. Plan has been discussed and pt is in agreement. PLAN:  1. Discharge Medication List as of 3/24/2018  9:14 AM      START taking these medications    Details   predniSONE (STERAPRED DS) 10 mg dose pack Per Dose Pack instructions, Print, Disp-21 Tab, R-0      albuterol (PROVENTIL HFA, VENTOLIN HFA, PROAIR HFA) 90 mcg/actuation inhaler Take 2 Puffs by inhalation every four (4) hours as needed for Wheezing., Print, Disp-1 Inhaler, R-1      !! albuterol (PROVENTIL VENTOLIN) 2.5 mg /3 mL (0.083 %) nebulizer solution 3 mL by Nebulization route every four (4) hours as needed for Wheezing., Print, Disp-24 Each, R-0      dextromethorphan-guaiFENesin (ROBITUSSIN-DM)  mg/5 mL syrup Take 10 mL by mouth every six (6) hours as needed for Cough. , Print, Disp-1 Bottle, R-0       !! - Potential duplicate medications found. Please discuss with provider. CONTINUE these medications which have NOT CHANGED    Details   !! albuterol (PROVENTIL VENTOLIN) 2.5 mg /3 mL (0.083 %) nebulizer solution 2.5 mg by Nebulization route once., Historical Med       !! - Potential duplicate medications found. Please discuss with provider. 2.   Follow-up Information     Follow up With Details Comments Contact Lucius Hansen MD Schedule an appointment as soon as possible for a visit As needed [place] [address] [phone]          Return to ED if worse     Diagnosis     Clinical Impression:   1. Viral URI with cough    2.  History of asthma Attestations: This note is prepared by [employer], acting as Scribe for Cookie Tobias PA-C: The scribe's documentation has been prepared under my direction and personally reviewed by me in its entirety. I confirm that the note above accurately reflects all work, treatment, procedures, and medical decision making performed by me.

## 2018-05-04 ENCOUNTER — HOSPITAL ENCOUNTER (EMERGENCY)
Age: 33
Discharge: HOME | End: 2018-05-04
Attending: EMERGENCY MEDICINE
Payer: MEDICAID

## 2018-05-04 VITALS
WEIGHT: 172.4 LBS | RESPIRATION RATE: 16 BRPM | HEART RATE: 82 BPM | HEIGHT: 68 IN | SYSTOLIC BLOOD PRESSURE: 115 MMHG | TEMPERATURE: 98.3 F | OXYGEN SATURATION: 100 % | BODY MASS INDEX: 26.13 KG/M2 | DIASTOLIC BLOOD PRESSURE: 81 MMHG

## 2018-05-04 DIAGNOSIS — G43.009 MIGRAINE WITHOUT AURA AND WITHOUT STATUS MIGRAINOSUS, NOT INTRACTABLE: Primary | ICD-10-CM

## 2018-05-04 LAB
APPEARANCE UR: CLEAR
BACTERIA URNS QL MICRO: NEGATIVE /HPF
BILIRUB UR QL: NEGATIVE
COLOR UR: ABNORMAL
EPITH CASTS URNS QL MICRO: ABNORMAL /LPF
GLUCOSE UR STRIP.AUTO-MCNC: NEGATIVE MG/DL
HGB UR QL STRIP: NEGATIVE
HYALINE CASTS URNS QL MICRO: ABNORMAL /LPF (ref 0–5)
KETONES UR QL STRIP.AUTO: NEGATIVE MG/DL
LEUKOCYTE ESTERASE UR QL STRIP.AUTO: ABNORMAL
NITRITE UR QL STRIP.AUTO: NEGATIVE
PH UR STRIP: 7 [PH] (ref 5–8)
PROT UR STRIP-MCNC: NEGATIVE MG/DL
RBC #/AREA URNS HPF: ABNORMAL /HPF (ref 0–5)
SP GR UR REFRACTOMETRY: 1.01 (ref 1–1.03)
UR CULT HOLD, URHOLD: NORMAL
UROBILINOGEN UR QL STRIP.AUTO: 1 EU/DL (ref 0.2–1)
WBC URNS QL MICRO: ABNORMAL /HPF (ref 0–4)

## 2018-05-04 PROCEDURE — 74011250636 HC RX REV CODE- 250/636: Performed by: NURSE PRACTITIONER

## 2018-05-04 PROCEDURE — 81001 URINALYSIS AUTO W/SCOPE: CPT | Performed by: NURSE PRACTITIONER

## 2018-05-04 PROCEDURE — 96374 THER/PROPH/DIAG INJ IV PUSH: CPT

## 2018-05-04 PROCEDURE — 96375 TX/PRO/DX INJ NEW DRUG ADDON: CPT

## 2018-05-04 PROCEDURE — 96361 HYDRATE IV INFUSION ADD-ON: CPT

## 2018-05-04 PROCEDURE — 99283 EMERGENCY DEPT VISIT LOW MDM: CPT

## 2018-05-04 RX ORDER — METOCLOPRAMIDE HYDROCHLORIDE 5 MG/ML
10 INJECTION INTRAMUSCULAR; INTRAVENOUS
Status: COMPLETED | OUTPATIENT
Start: 2018-05-04 | End: 2018-05-04

## 2018-05-04 RX ORDER — BUTALBITAL, ACETAMINOPHEN AND CAFFEINE 300; 40; 50 MG/1; MG/1; MG/1
1 CAPSULE ORAL
Qty: 20 CAP | Refills: 0 | Status: SHIPPED | OUTPATIENT
Start: 2018-05-04 | End: 2018-11-20

## 2018-05-04 RX ORDER — DEXAMETHASONE SODIUM PHOSPHATE 4 MG/ML
10 INJECTION, SOLUTION INTRA-ARTICULAR; INTRALESIONAL; INTRAMUSCULAR; INTRAVENOUS; SOFT TISSUE
Status: COMPLETED | OUTPATIENT
Start: 2018-05-04 | End: 2018-05-04

## 2018-05-04 RX ORDER — DIPHENHYDRAMINE HYDROCHLORIDE 50 MG/ML
25 INJECTION, SOLUTION INTRAMUSCULAR; INTRAVENOUS
Status: COMPLETED | OUTPATIENT
Start: 2018-05-04 | End: 2018-05-04

## 2018-05-04 RX ADMIN — SODIUM CHLORIDE 1000 ML: 900 INJECTION, SOLUTION INTRAVENOUS at 10:21

## 2018-05-04 RX ADMIN — DIPHENHYDRAMINE HYDROCHLORIDE 25 MG: 50 INJECTION, SOLUTION INTRAMUSCULAR; INTRAVENOUS at 10:48

## 2018-05-04 RX ADMIN — DEXAMETHASONE SODIUM PHOSPHATE 10 MG: 4 INJECTION, SOLUTION INTRAMUSCULAR; INTRAVENOUS at 10:45

## 2018-05-04 RX ADMIN — METOCLOPRAMIDE 10 MG: 5 INJECTION, SOLUTION INTRAMUSCULAR; INTRAVENOUS at 10:49

## 2018-05-04 NOTE — DISCHARGE INSTRUCTIONS
Migraine Headache: Care Instructions  Your Care Instructions  Migraines are painful, throbbing headaches that often start on one side of the head. They may cause nausea and vomiting and make you sensitive to light, sound, or smell. Without treatment, migraines can last from 4 hours to a few days. Medicines can help prevent migraines or stop them after they have started. Your doctor can help you find which ones work best for you. Follow-up care is a key part of your treatment and safety. Be sure to make and go to all appointments, and call your doctor if you are having problems. It's also a good idea to know your test results and keep a list of the medicines you take. How can you care for yourself at home? · Do not drive if you have taken a prescription pain medicine. · Rest in a quiet, dark room until your headache is gone. Close your eyes, and try to relax or go to sleep. Don't watch TV or read. · Put a cold, moist cloth or cold pack on the painful area for 10 to 20 minutes at a time. Put a thin cloth between the cold pack and your skin. · Use a warm, moist towel or a heating pad set on low to relax tight shoulder and neck muscles. · Have someone gently massage your neck and shoulders. · Take your medicines exactly as prescribed. Call your doctor if you think you are having a problem with your medicine. You will get more details on the specific medicines your doctor prescribes. · Be careful not to take pain medicine more often than the instructions allow. You could get worse or more frequent headaches when the medicine wears off. To prevent migraines  · Keep a headache diary so you can figure out what triggers your headaches. Avoiding triggers may help you prevent headaches. Record when each headache began, how long it lasted, and what the pain was like.  (Was it throbbing, aching, stabbing, or dull?) Write down any other symptoms you had with the headache, such as nausea, flashing lights or dark spots, or sensitivity to bright light or loud noise. Note if the headache occurred near your period. List anything that might have triggered the headache. Triggers may include certain foods (chocolate, cheese, wine) or odors, smoke, bright light, stress, or lack of sleep. · If your doctor has prescribed medicine for your migraines, take it as directed. You may have medicine that you take only when you get a migraine and medicine that you take all the time to help prevent migraines. ¨ If your doctor has prescribed medicine for when you get a headache, take it at the first sign of a migraine, unless your doctor has given you other instructions. ¨ If your doctor has prescribed medicine to prevent migraines, take it exactly as prescribed. Call your doctor if you think you are having a problem with your medicine. · Find healthy ways to deal with stress. Migraines are most common during or right after stressful times. Take time to relax before and after you do something that has caused a migraine in the past.  · Try to keep your muscles relaxed by keeping good posture. Check your jaw, face, neck, and shoulder muscles for tension. Try to relax them. When you sit at a desk, change positions often. And make sure to stretch for 30 seconds each hour. · Get plenty of sleep and exercise. · Eat meals on a regular schedule. Avoid foods and drinks that often trigger migraines. These include chocolate, alcohol (especially red wine and port), aspartame, monosodium glutamate (MSG), and some additives found in foods (such as hot dogs, cee, cold cuts, aged cheeses, and pickled foods). · Limit caffeine. Don't drink too much coffee, tea, or soda. But don't quit caffeine suddenly. That can also give you migraines. · Do not smoke or allow others to smoke around you. If you need help quitting, talk to your doctor about stop-smoking programs and medicines. These can increase your chances of quitting for good.   · If you are taking birth control pills or hormone therapy, talk to your doctor about whether they are triggering your migraines. When should you call for help? Call 911 anytime you think you may need emergency care. For example, call if:  ? · You have signs of a stroke. These may include:  ¨ Sudden numbness, paralysis, or weakness in your face, arm, or leg, especially on only one side of your body. ¨ Sudden vision changes. ¨ Sudden trouble speaking. ¨ Sudden confusion or trouble understanding simple statements. ¨ Sudden problems with walking or balance. ¨ A sudden, severe headache that is different from past headaches. ?Call your doctor now or seek immediate medical care if:  ? · You have new or worse nausea and vomiting. ? · You have a new or higher fever. ? · Your headache gets much worse. ? Watch closely for changes in your health, and be sure to contact your doctor if:  ? · You are not getting better after 2 days (48 hours). Where can you learn more? Go to http://bethany-nia.info/. Enter V890 in the search box to learn more about \"Migraine Headache: Care Instructions. \"  Current as of: October 14, 2016  Content Version: 11.4  © 7732-1096 [employer]. Care instructions adapted under license by [employer] (which disclaims liability or warranty for this information). If you have questions about a medical condition or this instruction, always ask your healthcare professional. [place] [address] any warranty or liability for your use of this information.

## 2018-05-04 NOTE — ED NOTES
Pt. Complains of migraine x3 days without complication. Pt. Usually takes Fioricet but has been out of medication.

## 2018-05-04 NOTE — ED PROVIDER NOTES
EMERGENCY DEPARTMENT HISTORY AND PHYSICAL EXAM      Date: 5/4/2018  Patient Name: Rasta Dos Santos    History of Presenting Illness     Chief Complaint   Patient presents with    Migraine     x today with bilateral eye pain; hx of migraines     EXPRESS CARE NOTE:  10:08 AM  I have seen and evaluated this patient in the Express Care portion of triage for HA x 3 days with associated photophobia. HA c/w previous migraines, but ran out of Fioricet. The patients care will begin now and orders have been placed. This patient will be seen and provided further care in the Emergency Room. Written by Shedrick Meigs, ED Scribe, as dictated by Maximo Del Toro FNP-BC. History Provided By: Patient    HPI: Rasta Dos Santos, 33 y.o. female with PMHx significant for migraines, presents ambulatory to the ED with cc of gradual onset, constant, worsening right frontal HA x 3 days with associated photophobia. Symptoms are worsened with light and improved when in the dark. Pt states that symptoms began 3 days ago and are similar to previous episodes of migraines. She notes an allergy to Ibuprofen stating it causes her to itch. She reports no chance of being pregnant. Pt specifically denies nausea, vomiting, numbness, tingling, weakness or trouble walking. There are no other complaints, changes, or physical findings at this time. PCP: Smith Feng MD    Current Outpatient Prescriptions   Medication Sig Dispense Refill    butalbital-acetaminophen-caff (FIORICET) -40 mg per capsule Take 1 Cap by mouth every four (4) hours as needed for Pain. 20 Cap 0    albuterol (PROVENTIL HFA, VENTOLIN HFA, PROAIR HFA) 90 mcg/actuation inhaler Take 2 Puffs by inhalation every four (4) hours as needed for Wheezing. 1 Inhaler 1    albuterol (PROVENTIL VENTOLIN) 2.5 mg /3 mL (0.083 %) nebulizer solution 3 mL by Nebulization route every four (4) hours as needed for Wheezing.  24 Each 0       Past History     Past Medical History:  Past Medical History:   Diagnosis Date    Asthma     Delivery normal     Depression     Ectopic pregnancy     Miscarriage     Neurological disorder     migraines    Other ill-defined conditions(799.89)     ectopic pregnancy    Psychiatric disorder     anger        Past Surgical History:  Past Surgical History:   Procedure Laterality Date    ABDOMEN SURGERY PROC UNLISTED      HX CHOLECYSTECTOMY      HX GYN      right oophetrecomy    HX HERNIA REPAIR  12/5/14    Open repair right inguinal hernia with mesh    HX ORTHOPAEDIC      Right arm/leg       Family History:  Family History   Problem Relation Age of Onset    Cancer Mother      breast    Hypertension Mother     Thyroid Disease Mother     Sickle Cell Trait Father     Asthma Sister     Asthma Brother     Asthma Sister     Asthma Sister     Asthma Sister     Asthma Sister     Asthma Sister     Asthma Brother     Asthma Brother     Asthma Brother     Asthma Brother     Sickle Cell Anemia Brother     Asthma Brother     Diabetes Paternal Grandmother        Social History:  Social History   Substance Use Topics    Smoking status: Former Smoker     Packs/day: 0.25     Types: Cigarettes     Quit date: 04/2016    Smokeless tobacco: Never Used      Comment: social smoker    Alcohol use Yes      Comment: occasionally       Allergies: Allergies   Allergen Reactions    Ibuprofen Hives and Itching         Review of Systems   Review of Systems   Constitutional: Negative for fatigue and fever. HENT: Negative. Eyes: Positive for photophobia. Respiratory: Negative for shortness of breath and wheezing. Cardiovascular: Negative for chest pain and leg swelling. Gastrointestinal: Negative for blood in stool, constipation, diarrhea, nausea and vomiting. Endocrine: Negative. Genitourinary: Negative for difficulty urinating and dysuria. Musculoskeletal: Negative. Skin: Negative for rash. Allergic/Immunologic: Negative.     Neurological: Positive for headaches. Negative for weakness and numbness. Hematological: Negative. Psychiatric/Behavioral: Negative. Physical Exam   Physical Exam   Constitutional: She is oriented to person, place, and time. She appears well-developed and well-nourished. HENT:   Head: Normocephalic and atraumatic. Mouth/Throat: Mucous membranes are normal.   Eyes: EOM are normal. Pupils are equal, round, and reactive to light. Neck: Normal range of motion. No JVD present. No tracheal deviation present. Cardiovascular: Normal rate, regular rhythm, normal heart sounds and intact distal pulses. Exam reveals no gallop and no friction rub. No murmur heard. Pulmonary/Chest: Effort normal and breath sounds normal. No stridor. She has no wheezes. She has no rales. Abdominal: Soft. Bowel sounds are normal. She exhibits no distension and no mass. There is no tenderness. There is no guarding. Musculoskeletal: Normal range of motion. She exhibits no edema or tenderness. No nuchal rigidity  Normal ROM of the neck   Neurological: She is alert and oriented to person, place, and time. Skin: Skin is warm and dry. No rash noted. Psychiatric: She has a normal mood and affect.  Her behavior is normal. Judgment and thought content normal.         Diagnostic Study Results     Labs -  Recent Results (from the past 12 hour(s))   URINALYSIS W/MICROSCOPIC    Collection Time: 05/04/18 10:44 AM   Result Value Ref Range    Color YELLOW/STRAW      Appearance CLEAR CLEAR      Specific gravity 1.015 1.003 - 1.030      pH (UA) 7.0 5.0 - 8.0      Protein NEGATIVE  NEG mg/dL    Glucose NEGATIVE  NEG mg/dL    Ketone NEGATIVE  NEG mg/dL    Bilirubin NEGATIVE  NEG      Blood NEGATIVE  NEG      Urobilinogen 1.0 0.2 - 1.0 EU/dL    Nitrites NEGATIVE  NEG      Leukocyte Esterase TRACE (A) NEG      WBC 10-20 0 - 4 /hpf    RBC 0-5 0 - 5 /hpf    Epithelial cells FEW FEW /lpf    Bacteria NEGATIVE  NEG /hpf    Hyaline cast 5-10 0 - 5 /lpf URINE CULTURE HOLD SAMPLE    Collection Time: 05/04/18 10:44 AM   Result Value Ref Range    Urine culture hold        URINE ON HOLD IN MICROBIOLOGY DEPT FOR 3 DAYS. IF UNPRESERVED URINE IS SUBMITTED, IT CANNOT BE USED FOR ADDITIONAL TESTING AFTER 24 HRS, RECOLLECTION WILL BE REQUIRED. Medical Decision Making   I am the first provider for this patient. I reviewed the vital signs, available nursing notes, past medical history, past surgical history, family history and social history. Vital Signs-Reviewed the patient's vital signs. Patient Vitals for the past 12 hrs:   Temp Pulse Resp BP SpO2   05/04/18 1146 - 82 16 115/81 100 %   05/04/18 1004 98.3 °F (36.8 °C) 78 18 119/79 100 %       Pulse Oximetry Analysis - 100% on RA    Cardiac Monitor:   Rate: 78 bpm  Rhythm: Normal Sinus Rhythm     Records Reviewed: Nursing Notes and Old Medical Records    Provider Notes (Medical Decision Making):   DDx: history of migraines with symptoms consistent with prior episodes. No focal neurological deficits. No signs or symptoms of meningitis. Will treat symptomatically and reassess. ED Course:   Initial assessment performed. The patients presenting problems have been discussed, and they are in agreement with the care plan formulated and outlined with them. I have encouraged them to ask questions as they arise throughout their visit. Progress Note:  11:53 AM  Pt has been re-evaluated and is feeling better. Pt has been given something to drink. Disposition:  DISCHARGE NOTE:  12:28 PM  The patient is ready for discharge. The patients signs, symptoms, diagnosis, and instructions for discharge have been discussed and the pt has conveyed their understanding. The patient is to follow up as recommended or return to the ER should their symptoms worsen. Plan has been discussed and patient has conveyed their agreement. PLAN:  1.  Discharge to home  Current Discharge Medication List      START taking these medications Details   butalbital-acetaminophen-caff (FIORICET) -40 mg per capsule Take 1 Cap by mouth every four (4) hours as needed for Pain. Qty: 20 Cap, Refills: 0           2. Follow-up Information     Follow up With Details Comments Contact Info    Shelia Cisneros MD Schedule an appointment as soon as possible for a visit  [place] [address]  [phone]      [facility] EMERGENCY DEPT  As needed, If symptoms worsen [address] [place] [address]  [phone]        Return to ED if worse     Diagnosis     Clinical Impression:   1. Migraine without aura and without status migrainosus, not intractable        Attestations: This note is prepared by Stacy Huddleston, acting as Scribe for Akila Arreola DO. Akila Arreola DO: The scribe's documentation has been prepared under my direction and personally reviewed by me in its entirety. I confirm that the note above accurately reflects all work, treatment, procedures, and medical decision making performed by me.

## 2018-05-17 ENCOUNTER — HOSPITAL ENCOUNTER (EMERGENCY)
Age: 33
Discharge: HOME | End: 2018-05-17
Attending: EMERGENCY MEDICINE | Admitting: EMERGENCY MEDICINE
Payer: MEDICAID

## 2018-05-17 VITALS
SYSTOLIC BLOOD PRESSURE: 135 MMHG | HEART RATE: 80 BPM | RESPIRATION RATE: 16 BRPM | DIASTOLIC BLOOD PRESSURE: 75 MMHG | WEIGHT: 176.81 LBS | TEMPERATURE: 98.7 F | OXYGEN SATURATION: 100 % | HEIGHT: 68 IN | BODY MASS INDEX: 26.8 KG/M2

## 2018-05-17 DIAGNOSIS — S76.112A QUADRICEPS STRAIN, LEFT, INITIAL ENCOUNTER: Primary | ICD-10-CM

## 2018-05-17 PROCEDURE — 99282 EMERGENCY DEPT VISIT SF MDM: CPT

## 2018-05-17 RX ORDER — PREDNISONE 10 MG/1
TABLET ORAL
Qty: 21 TAB | Refills: 0 | Status: SHIPPED | OUTPATIENT
Start: 2018-05-17 | End: 2018-11-20

## 2018-05-17 RX ORDER — HYDROCODONE BITARTRATE AND ACETAMINOPHEN 5; 325 MG/1; MG/1
1 TABLET ORAL
Qty: 10 TAB | Refills: 0 | Status: SHIPPED | OUTPATIENT
Start: 2018-05-17 | End: 2018-11-20

## 2018-05-17 RX ORDER — CYCLOBENZAPRINE HCL 10 MG
10 TABLET ORAL
Qty: 20 TAB | Refills: 0 | Status: SHIPPED | OUTPATIENT
Start: 2018-05-17 | End: 2019-03-21

## 2018-05-17 NOTE — ED PROVIDER NOTES
EMERGENCY DEPARTMENT HISTORY AND PHYSICAL EXAM      Date: 5/17/2018  Patient Name: Bryan Obrien    History of Presenting Illness     Chief Complaint   Patient presents with    Leg Pain     starting in hip, radiating down left leg x3 days; denies injury       History Provided By: Patient    HPI: Bryan Obrien, 33 y.o. female, presents ambulatory to the ED with cc of intermittent sharp left leg pain that radiates down from her hip for 3 months that is exacerbated with ambulation. Pt states she takes BC powder for her pain with some alleviation and denies any recent injury or fall. There are no other complaints, changes, or physical findings at this time. PCP: Darling King MD    Current Outpatient Prescriptions   Medication Sig Dispense Refill    HYDROcodone-acetaminophen (NORCO) 5-325 mg per tablet Take 1 Tab by mouth every four (4) hours as needed for Pain. Max Daily Amount: 6 Tabs. 10 Tab 0    cyclobenzaprine (FLEXERIL) 10 mg tablet Take 1 Tab by mouth three (3) times daily as needed for Muscle Spasm(s). 20 Tab 0    predniSONE (STERAPRED DS) 10 mg dose pack Per Dose Pack instructions 21 Tab 0    butalbital-acetaminophen-caff (FIORICET) -40 mg per capsule Take 1 Cap by mouth every four (4) hours as needed for Pain. 20 Cap 0    albuterol (PROVENTIL HFA, VENTOLIN HFA, PROAIR HFA) 90 mcg/actuation inhaler Take 2 Puffs by inhalation every four (4) hours as needed for Wheezing. 1 Inhaler 1    albuterol (PROVENTIL VENTOLIN) 2.5 mg /3 mL (0.083 %) nebulizer solution 3 mL by Nebulization route every four (4) hours as needed for Wheezing.  24 Each 0       Past History     Past Medical History:  Past Medical History:   Diagnosis Date    Asthma     Delivery normal     Depression     Ectopic pregnancy     Miscarriage     Neurological disorder     migraines    Other ill-defined conditions(279.88)     ectopic pregnancy    Psychiatric disorder     anger        Past Surgical History:  Past Surgical History:   Procedure Laterality Date    ABDOMEN SURGERY PROC UNLISTED      HX CHOLECYSTECTOMY      HX GYN      right oophetrecomy    HX HERNIA REPAIR  12/5/14    Open repair right inguinal hernia with mesh    HX ORTHOPAEDIC      Right arm/leg       Family History:  Family History   Problem Relation Age of Onset    Cancer Mother      breast    Hypertension Mother     Thyroid Disease Mother     Sickle Cell Trait Father     Asthma Sister     Asthma Brother     Asthma Sister     Asthma Sister     Asthma Sister     Asthma Sister     Asthma Sister     Asthma Brother     Asthma Brother     Asthma Brother     Asthma Brother     Sickle Cell Anemia Brother     Asthma Brother     Diabetes Paternal Grandmother        Social History:  Social History   Substance Use Topics    Smoking status: Former Smoker     Packs/day: 0.25     Types: Cigarettes     Quit date: 04/2016    Smokeless tobacco: Never Used      Comment: social smoker    Alcohol use Yes      Comment: occasionally       Allergies: Allergies   Allergen Reactions    Ibuprofen Hives and Itching         Review of Systems   Review of Systems   Constitutional: Negative for fatigue and fever. HENT: Negative for ear pain and sore throat. Eyes: Negative for pain, redness and visual disturbance. Respiratory: Negative for cough and shortness of breath. Cardiovascular: Negative for chest pain and palpitations. Gastrointestinal: Negative for abdominal pain, nausea and vomiting. Genitourinary: Negative for dysuria, frequency and urgency. Musculoskeletal: Positive for myalgias (left hip/leg pain). Negative for back pain, gait problem, neck pain and neck stiffness. Skin: Negative for rash and wound. Neurological: Negative for dizziness, weakness, light-headedness, numbness and headaches. Physical Exam   Physical Exam   Constitutional: She is oriented to person, place, and time. She appears well-developed and well-nourished. Non-toxic appearance. No distress. HENT:   Head: Normocephalic and atraumatic. Right Ear: External ear normal.   Left Ear: External ear normal.   Nose: Nose normal.   Mouth/Throat: Uvula is midline. No trismus in the jaw. Eyes: Conjunctivae and EOM are normal. Pupils are equal, round, and reactive to light. No scleral icterus. Neck: Normal range of motion and full passive range of motion without pain. Cardiovascular: Normal rate and regular rhythm. Pulmonary/Chest: Effort normal. No accessory muscle usage. No tachypnea. No respiratory distress. She has no decreased breath sounds. She has no wheezes. Abdominal: Soft. There is no tenderness. Musculoskeletal: Normal range of motion. Left upper leg: She exhibits no swelling. Left lower leg: She exhibits no swelling. Transitions from sitting to standing without difficulty. Normal gait. Marked discomfort to left quadriceps with palpation. Neurological: She is alert and oriented to person, place, and time. She is not disoriented. No cranial nerve deficit. GCS eye subscore is 4. GCS verbal subscore is 5. GCS motor subscore is 6. Skin: Skin is intact. No bruising and no rash noted. No erythema. Psychiatric: She has a normal mood and affect. Her speech is normal.   Nursing note and vitals reviewed. Medical Decision Making   I am the first provider for this patient. I reviewed the vital signs, available nursing notes, past medical history, past surgical history, family history and social history. Vital Signs-Reviewed the patient's vital signs. Patient Vitals for the past 12 hrs:   Temp Pulse Resp BP SpO2   05/17/18 1552 98.7 °F (37.1 °C) 80 16 135/75 100 %       Pulse Oximetry Analysis - 100% on room air    Cardiac Monitor:   Rate: 80 bpm  Rhythm: Normal Sinus Rhythm 135/75     Records Reviewed: Nursing Notes and Old Medical Records    Provider Notes (Medical Decision Making):    Afebrile; well appearing; reassuring exam; no specific injury; additional testing deferred; plan as below    ED Course:   Initial assessment performed. The patients presenting problems have been discussed, and they are in agreement with the care plan formulated and outlined with them. I have encouraged them to ask questions as they arise throughout their visit. 4:27 PM   reviewed. 4:36 PM  Review of medical records reveal this is the 5th visit for the same complaint over the last 18 months. Disposition:  DISCHARGE NOTE  4:38 PM  The patient has been re-evaluated and is ready for discharge. Reviewed available results with patient. Counseled patient on diagnosis and care plan. Patient has expressed understanding, and all questions have been answered. Patient agrees with plan and agrees to follow up as recommended, or return to the ED if their symptoms worsen. Discharge instructions have been provided and explained to the patient, along with reasons to return to the ED. PLAN:  1. Discharge Medication List as of 5/17/2018  4:39 PM      START taking these medications    Details   HYDROcodone-acetaminophen (NORCO) 5-325 mg per tablet Take 1 Tab by mouth every four (4) hours as needed for Pain. Max Daily Amount: 6 Tabs., Print, Disp-10 Tab, R-0      cyclobenzaprine (FLEXERIL) 10 mg tablet Take 1 Tab by mouth three (3) times daily as needed for Muscle Spasm(s). , Print, Disp-20 Tab, R-0      predniSONE (STERAPRED DS) 10 mg dose pack Per Dose Pack instructions, Print, Disp-21 Tab, R-0         CONTINUE these medications which have NOT CHANGED    Details   butalbital-acetaminophen-caff (FIORICET) -40 mg per capsule Take 1 Cap by mouth every four (4) hours as needed for Pain., Normal, Disp-20 Cap, R-0      albuterol (PROVENTIL HFA, VENTOLIN HFA, PROAIR HFA) 90 mcg/actuation inhaler Take 2 Puffs by inhalation every four (4) hours as needed for Wheezing., Print, Disp-1 Inhaler, R-1      albuterol (PROVENTIL VENTOLIN) 2.5 mg /3 mL (0.083 %) nebulizer solution 3 mL by Nebulization route every four (4) hours as needed for Wheezing., Print, Disp-24 Each, R-0           2. Follow-up Information     Follow up With Details Comments Contact Info    Cherise Vann MD Schedule an appointment as soon as possible for a visit ORTHO: call to schedule follow up [address] [place] [address]  [phone]          Return to ED if worse     Diagnosis     Clinical Impression:   1. Quadriceps strain, left, initial encounter        Attestations: This note is prepared by Antoni Garcia, acting as Scribe for DORI Kumar. DORI Kumar: The scribe's documentation has been prepared under my direction and personally reviewed by me in its entirety. I confirm that the note above accurately reflects all work, treatment, procedures, and medical decision making performed by me.

## 2018-08-02 ENCOUNTER — HOSPITAL ENCOUNTER (EMERGENCY)
Age: 33
Discharge: HOME | End: 2018-08-02
Attending: EMERGENCY MEDICINE
Payer: MEDICAID

## 2018-08-02 VITALS
DIASTOLIC BLOOD PRESSURE: 100 MMHG | BODY MASS INDEX: 26.83 KG/M2 | OXYGEN SATURATION: 100 % | WEIGHT: 177.03 LBS | HEIGHT: 68 IN | HEART RATE: 96 BPM | TEMPERATURE: 98.8 F | RESPIRATION RATE: 18 BRPM | SYSTOLIC BLOOD PRESSURE: 155 MMHG

## 2018-08-02 DIAGNOSIS — M54.50 LUMBAR PAIN: ICD-10-CM

## 2018-08-02 DIAGNOSIS — M62.838 MUSCLE SPASM: ICD-10-CM

## 2018-08-02 DIAGNOSIS — M54.50 ACUTE MIDLINE LOW BACK PAIN WITHOUT SCIATICA: ICD-10-CM

## 2018-08-02 DIAGNOSIS — S39.012A ACUTE MYOFASCIAL STRAIN OF LUMBAR REGION, INITIAL ENCOUNTER: Primary | ICD-10-CM

## 2018-08-02 LAB
AMORPH CRY URNS QL MICRO: ABNORMAL
APPEARANCE UR: CLEAR
BACTERIA URNS QL MICRO: NEGATIVE /HPF
BILIRUB UR QL: NEGATIVE
COLOR UR: ABNORMAL
EPITH CASTS URNS QL MICRO: ABNORMAL /LPF
GLUCOSE UR STRIP.AUTO-MCNC: NEGATIVE MG/DL
HGB UR QL STRIP: NEGATIVE
KETONES UR QL STRIP.AUTO: NEGATIVE MG/DL
LEUKOCYTE ESTERASE UR QL STRIP.AUTO: NEGATIVE
NITRITE UR QL STRIP.AUTO: NEGATIVE
PH UR STRIP: 7 [PH] (ref 5–8)
PROT UR STRIP-MCNC: NEGATIVE MG/DL
RBC #/AREA URNS HPF: ABNORMAL /HPF (ref 0–5)
SP GR UR REFRACTOMETRY: 1.02 (ref 1–1.03)
UA: UC IF INDICATED,UAUC: ABNORMAL
UROBILINOGEN UR QL STRIP.AUTO: 1 EU/DL (ref 0.2–1)
WBC URNS QL MICRO: ABNORMAL /HPF (ref 0–4)

## 2018-08-02 PROCEDURE — 81001 URINALYSIS AUTO W/SCOPE: CPT | Performed by: EMERGENCY MEDICINE

## 2018-08-02 PROCEDURE — 74011250637 HC RX REV CODE- 250/637: Performed by: EMERGENCY MEDICINE

## 2018-08-02 PROCEDURE — 81025 URINE PREGNANCY TEST: CPT

## 2018-08-02 PROCEDURE — 99283 EMERGENCY DEPT VISIT LOW MDM: CPT

## 2018-08-02 RX ORDER — LIDOCAINE 50 MG/G
PATCH TOPICAL
Qty: 1 EACH | Refills: 0 | Status: SHIPPED | OUTPATIENT
Start: 2018-08-02 | End: 2018-11-20

## 2018-08-02 RX ORDER — METHOCARBAMOL 750 MG/1
750 TABLET, FILM COATED ORAL ONCE
Status: COMPLETED | OUTPATIENT
Start: 2018-08-02 | End: 2018-08-02

## 2018-08-02 RX ORDER — METHOCARBAMOL 750 MG/1
750 TABLET, FILM COATED ORAL 4 TIMES DAILY
Qty: 20 TAB | Refills: 0 | Status: SHIPPED | OUTPATIENT
Start: 2018-08-02 | End: 2019-03-21

## 2018-08-02 RX ORDER — LIDOCAINE 50 MG/G
1 PATCH TOPICAL ONCE
Status: DISCONTINUED | OUTPATIENT
Start: 2018-08-02 | End: 2018-08-02 | Stop reason: HOSPADM

## 2018-08-02 RX ORDER — TRAMADOL HYDROCHLORIDE 50 MG/1
50 TABLET ORAL
Qty: 10 TAB | Refills: 0 | Status: SHIPPED | OUTPATIENT
Start: 2018-08-02 | End: 2018-11-20

## 2018-08-02 RX ADMIN — METHOCARBAMOL 750 MG: 750 TABLET ORAL at 17:37

## 2018-08-02 NOTE — ED PROVIDER NOTES
EMERGENCY DEPARTMENT HISTORY AND PHYSICAL EXAM 
 
 
Date: 8/2/2018 Patient Name: West Castillo History of Presenting Illness Chief Complaint Patient presents with  Back Pain  
  c/o atraumatic, reproducible, low back pain x three days. Denies n/v/d/urinary complaints/vaginal complaints History Provided By: Patient HPI: West Castillo, 33 y.o. female with PMHx significant for migraines, depression, presents ambulatory to the ED with cc of atraumatic and reproducible lower back pain with onset 3 days ago. Pt reports taking Advil for her pain with no relief. Pt discloses that she is allergic to Ibuprofen. Pt denies possibility of pregnancy. Pt specifically denies any HA, CP, SOB, abdominal pain, numbness, tingling, fever, chills, nausea, vomiting, diarrhea, urinary sxs, or vaginal complaints. There are no other complaints, changes, or physical findings at this time. PCP: Lorraine Berry MD 
 
Current Facility-Administered Medications Medication Dose Route Frequency Provider Last Rate Last Dose  lidocaine (LIDODERM) 5 % patch 1 Patch  1 Patch TransDERmal ONCE Kera Ogden MD   1 Patch at 08/02/18 3081 Current Outpatient Prescriptions Medication Sig Dispense Refill  methocarbamol (ROBAXIN-750) 750 mg tablet Take 1 Tab by mouth four (4) times daily. 20 Tab 0  
 lidocaine (LIDODERM) 5 % Apply patch to the affected area for 12 hours a day and remove for 12 hours a day. 1 Each 0  
 traMADol (ULTRAM) 50 mg tablet Take 1 Tab by mouth every six (6) hours as needed for Pain. Max Daily Amount: 200 mg. Indications: Pain 10 Tab 0  
 HYDROcodone-acetaminophen (NORCO) 5-325 mg per tablet Take 1 Tab by mouth every four (4) hours as needed for Pain. Max Daily Amount: 6 Tabs. 10 Tab 0  cyclobenzaprine (FLEXERIL) 10 mg tablet Take 1 Tab by mouth three (3) times daily as needed for Muscle Spasm(s). 20 Tab 0  predniSONE (STERAPRED DS) 10 mg dose pack Per Dose Pack instructions 21 Tab 0  butalbital-acetaminophen-caff (FIORICET) -40 mg per capsule Take 1 Cap by mouth every four (4) hours as needed for Pain. 20 Cap 0  
 albuterol (PROVENTIL HFA, VENTOLIN HFA, PROAIR HFA) 90 mcg/actuation inhaler Take 2 Puffs by inhalation every four (4) hours as needed for Wheezing. 1 Inhaler 1  
 albuterol (PROVENTIL VENTOLIN) 2.5 mg /3 mL (0.083 %) nebulizer solution 3 mL by Nebulization route every four (4) hours as needed for Wheezing. 24 Each 0 Past History Past Medical History: 
Past Medical History:  
Diagnosis Date  Asthma  Delivery normal   
 Depression  Ectopic pregnancy  Miscarriage  Neurological disorder   
 migraines  Other ill-defined conditions(799.89)   
 ectopic pregnancy  Psychiatric disorder   
 anger Past Surgical History: 
Past Surgical History:  
Procedure Laterality Date [address] UNLISTED  HX CHOLECYSTECTOMY  HX GYN    
 right oophetrecomy  HX HERNIA REPAIR  12/5/14 Open repair right inguinal hernia with mesh  HX ORTHOPAEDIC Right arm/leg Family History: 
Family History Problem Relation Age of Onset  Cancer Mother   
  breast  
 Hypertension Mother  Thyroid Disease Mother  Sickle Cell Trait Father  Asthma Sister  Asthma Brother  Asthma Sister  Asthma Sister  Asthma Sister  Asthma Sister  Asthma Sister  Asthma Brother  Asthma Brother  Asthma Brother  Asthma Brother  Sickle Cell Anemia Brother  Asthma Brother  Diabetes Paternal Grandmother Social History: 
Social History Substance Use Topics  Smoking status: Former Smoker Packs/day: 0.25 Types: Cigarettes Quit date: 04/2016  Smokeless tobacco: Never Used Comment: social smoker  Alcohol use Yes Comment: occasionally Allergies: Allergies Allergen Reactions  Ibuprofen Hives and Itching Review of Systems Review of Systems Constitutional: Negative. Negative for chills and fever. HENT: Negative. Negative for congestion, facial swelling, rhinorrhea, sore throat, trouble swallowing and voice change. Eyes: Negative. Respiratory: Negative. Negative for apnea, cough, chest tightness, shortness of breath and wheezing. Cardiovascular: Negative. Negative for chest pain, palpitations and leg swelling. Gastrointestinal: Negative. Negative for abdominal distention, abdominal pain, blood in stool, constipation, diarrhea, nausea and vomiting. Endocrine: Negative. Negative for cold intolerance, heat intolerance and polyuria. Genitourinary: Negative. Negative for difficulty urinating, dysuria, flank pain, frequency, hematuria and urgency. Musculoskeletal: Positive for back pain (lower). Negative for arthralgias, myalgias, neck pain and neck stiffness. Skin: Negative. Negative for color change and rash. Neurological: Negative. Negative for dizziness, syncope, facial asymmetry, speech difficulty, weakness, light-headedness, numbness and headaches. Hematological: Negative. Does not bruise/bleed easily. Psychiatric/Behavioral: Negative. Negative for confusion and self-injury. The patient is not nervous/anxious. Physical Exam  
Physical Exam  
Constitutional: She is oriented to person, place, and time. She appears well-developed and well-nourished. No distress. HENT:  
Head: Normocephalic and atraumatic. Mouth/Throat: No oropharyngeal exudate. Eyes: Conjunctivae and EOM are normal. Pupils are equal, round, and reactive to light. No scleral icterus. Neck: Normal range of motion. Neck supple. Cardiovascular: Normal rate, regular rhythm, normal heart sounds and intact distal pulses. Exam reveals no gallop and no friction rub. No murmur heard. Pulmonary/Chest: Effort normal and breath sounds normal.  
Abdominal: Soft. Bowel sounds are normal. There is no tenderness.  There is no rebound and no guarding. Musculoskeletal: Normal range of motion. She exhibits no edema or tenderness. Para lumbar tenderness No midline tenderness Neurological: She is alert and oriented to person, place, and time. She has normal reflexes. No cranial nerve deficit. She exhibits normal muscle tone. Coordination normal.  
Skin: Skin is warm and dry. She is not diaphoretic. Psychiatric: She has a normal mood and affect. Her behavior is normal. Judgment and thought content normal.  
 
 
Diagnostic Study Results Labs - Recent Results (from the past 12 hour(s)) URINALYSIS W/ REFLEX CULTURE Collection Time: 08/02/18  5:20 PM  
Result Value Ref Range Color YELLOW/STRAW Appearance CLEAR CLEAR Specific gravity 1.024 1.003 - 1.030    
 pH (UA) 7.0 5.0 - 8.0 Protein NEGATIVE  NEG mg/dL Glucose NEGATIVE  NEG mg/dL Ketone NEGATIVE  NEG mg/dL Bilirubin NEGATIVE  NEG Blood NEGATIVE  NEG Urobilinogen 1.0 0.2 - 1.0 EU/dL Nitrites NEGATIVE  NEG Leukocyte Esterase NEGATIVE  NEG    
 WBC 0-4 0 - 4 /hpf  
 RBC 0-5 0 - 5 /hpf Epithelial cells FEW FEW /lpf Bacteria NEGATIVE  NEG /hpf  
 UA:UC IF INDICATED CULTURE NOT INDICATED BY UA RESULT CNI Amorphous Crystals FEW (A) NEG Radiologic Studies - No orders to display CT Results  (Last 48 hours) None CXR Results  (Last 48 hours) None Medical Decision Making I am the first provider for this patient. I reviewed the vital signs, available nursing notes, past medical history, past surgical history, family history and social history. Vital Signs-Reviewed the patient's vital signs. Patient Vitals for the past 12 hrs: 
 Temp Pulse Resp BP SpO2  
08/02/18 1607 98.8 °F (37.1 °C) 96 18 (!) 155/100 100 % Records Reviewed: Nursing Notes, Old Medical Records, Previous electrocardiograms, Ambulance Run Sheet, Previous Radiology Studies and Previous Laboratory Studies Provider Notes (Medical Decision Making): DDx: UTI, muscle spasm, muscle strain No urine/bowel incontinence or perianal numbess to suggest cauda equina. No fever/chills, IVDA to suggest epidural abscess or discitis. No focal weakness or sensory changes to suggest transverse myelitis. Therefore MRI not indicated. No risk of compression fracture (not in right age group or suffer from [address]) or trauma to warrant x-ray. ED Course:  
Initial assessment performed. The patients presenting problems have been discussed, and they are in agreement with the care plan formulated and outlined with them. I have encouraged them to ask questions as they arise throughout their visit. Medications  
methocarbamol (ROBAXIN) tablet 750 mg (750 mg Oral Given 8/2/18 1737) TOBACCO COUNSELING: 
Upon evaluation, pt expressed that they are a current tobacco user. For approximately 10 minutes, pt has been counseled on the dangers of smoking and was encouraged to quit as soon as possible in order to decrease further risks to their health. Pt has conveyed their understanding of the risks involved should they continue to use tobacco products. Progress Note: 
5:55 PM 
Pt states she feels much better after ED treatment; pt able to tolerate PO and ambulate per baseline. Pt is clinically safe for discharge. Updated pt and/or family on all final lab and imaging findings. Will follow up as instructed. All questions have been answered, pt voiced understanding and agreement with plan. If narcotics were prescribed, pt was advised not to drive or operate heavy machinery. Additionally, if abx were prescribed, pt advised that diarrhea and rash are possible side effects of the medications. Specific return precautions provided as well as instructions to return to the ED should sx worsen at any time. At time of discharge, pt had stable vital signs and had no questions or concerns, and was very satisfied with overall care.    
 
Critical Care Time: None 
 
Disposition: 
Discharge Note: 
8:56 PM 
The pt is ready for discharge. The pt's signs, symptoms, diagnosis, and discharge instructions have been discussed and pt has conveyed their understanding. The pt is to follow up as recommended or return to ER should their symptoms worsen. Plan has been discussed and pt is in agreement. PLAN: 
1. Discharge Medication List as of 8/2/2018  5:55 PM  
  
CONTINUE these medications which have NOT CHANGED Details HYDROcodone-acetaminophen (NORCO) 5-325 mg per tablet Take 1 Tab by mouth every four (4) hours as needed for Pain. Max Daily Amount: 6 Tabs., Print, Disp-10 Tab, R-0  
  
cyclobenzaprine (FLEXERIL) 10 mg tablet Take 1 Tab by mouth three (3) times daily as needed for Muscle Spasm(s). , Print, Disp-20 Tab, R-0  
  
predniSONE (STERAPRED DS) 10 mg dose pack Per Dose Pack instructions, Print, Disp-21 Tab, R-0  
  
butalbital-acetaminophen-caff (FIORICET) -40 mg per capsule Take 1 Cap by mouth every four (4) hours as needed for Pain., Normal, Disp-20 Cap, R-0  
  
albuterol (PROVENTIL HFA, VENTOLIN HFA, PROAIR HFA) 90 mcg/actuation inhaler Take 2 Puffs by inhalation every four (4) hours as needed for Wheezing., Print, Disp-1 Inhaler, R-1  
  
albuterol (PROVENTIL VENTOLIN) 2.5 mg /3 mL (0.083 %) nebulizer solution 3 mL by Nebulization route every four (4) hours as needed for Wheezing., Print, Disp-24 Each, R-0  
  
  
 
2. Follow-up Information Follow up With Details Comments Contact Info Jie Amador MD   [address] [address] 
[phone] [facility] EMERGENCY DEPT  As needed, If symptoms worsen [address] 
[phone] Return to ED if worse Diagnosis Clinical Impression: 1. Acute myofascial strain of lumbar region, initial encounter 2. Lumbar pain 3. Muscle spasm 4. Acute midline low back pain without sciatica Attestations:  
 
This note is prepared by Aidee Isaacs. Aly Garcia, acting as Scribe for Live Mcpherson MD. Live Mcpherson MD: The scribe's documentation has been prepared under my direction and personally reviewed by me in its entirety. I confirm that the note above accurately reflects all work, treatment, procedures, and medical decision making performed by me. This note will not be viewable in [address]

## 2018-08-02 NOTE — ED NOTES
Dr. Kavita Norwood has reviewed discharge instructions with the patient. The patient verbalized understanding. Pt. A&Ox4, respirations even and unlabored. VS stable as noted in flowsheet. Declined wheelchair assist from department; paperwork in hand.

## 2018-08-02 NOTE — DISCHARGE INSTRUCTIONS
Thank you! Thank you for allowing us to provide you with excellent care today. We hope we addressed all of your concerns and needs. We strive to provide excellent quality care in the Emergency Department. You may receive a survey after your visit to evaluate the care you were provided. Should you receive a survey from us, we invite you to share your experience and tell us what made it excellent. It was a pleasure serving you, we invite you to share your experience with us, in our pursuit for excellence, should you be selected to receive a survey. If you feel that you have not received excellent quality care or timely care, please ask to speak to the nurse manager. Please choose us in the future for your continued health care needs. ------------------------------------------------------------------------------------------------------------  The exam and treatment you received in the Emergency Department were for an urgent problem and are not intended as complete care. It is important that you follow up with a doctor, nurse practitioner, or physician assistant for ongoing care. If your symptoms become worse or you do not improve as expected and you are unable to reach your usual health care provider, you should return to the Emergency Department. We are available 24 hours a day. Please take your discharge instructions with you when you go to your follow-up appointment. If you have any problem arranging a follow-up appointment, contact the Emergency Department immediately. If a prescription has been provided, please have it filled as soon as possible to prevent a delay in treatment. Read the entire medication instruction sheet provided to you by the pharmacy. If you have any questions or reservations about taking the medication due to side effects or interactions with other medications, please call your primary care physician or contact the ER to speak with the charge nurse.      Make an appointment with your family doctor or the physician you were referred to for follow-up of this visit as instructed on your discharge paperwork, as this is mandatory follow-up. Return to the ER if you are unable to be seen or if you are unable to be seen in a timely manner. If you have any problem arranging the follow-up visit, contact the Emergency Department immediately. Back Pain: Care Instructions  Your Care Instructions    Back pain has many possible causes. It is often related to problems with muscles and ligaments of the back. It may also be related to problems with the nerves, discs, or bones of the back. Moving, lifting, standing, sitting, or sleeping in an awkward way can strain the back. Sometimes you don't notice the injury until later. Arthritis is another common cause of back pain. Although it may hurt a lot, back pain usually improves on its own within several weeks. Most people recover in 12 weeks or less. Using good home treatment and being careful not to stress your back can help you feel better sooner. Follow-up care is a key part of your treatment and safety. Be sure to make and go to all appointments, and call your doctor if you are having problems. It's also a good idea to know your test results and keep a list of the medicines you take. How can you care for yourself at home? · Sit or lie in positions that are most comfortable and reduce your pain. Try one of these positions when you lie down:  ¨ Lie on your back with your knees bent and supported by large pillows. ¨ Lie on the floor with your legs on the seat of a sofa or chair. [place]  on your side with your knees and hips bent and a pillow between your legs. ¨ Lie on your stomach if it does not make pain worse. · Do not sit up in bed, and avoid soft couches and twisted positions. Bed rest can help relieve pain at first, but it delays healing. Avoid bed rest after the first day of back pain.   · Change positions every 30 minutes. If you must sit for long periods of time, take breaks from sitting. Get up and walk around, or lie in a comfortable position. · Try using a heating pad on a low or medium setting for 15 to 20 minutes every 2 or 3 hours. Try a warm shower in place of one session with the heating pad. · You can also try an ice pack for 10 to 15 minutes every 2 to 3 hours. Put a thin cloth between the ice pack and your skin. · Take pain medicines exactly as directed. ¨ If the doctor gave you a prescription medicine for pain, take it as prescribed. ¨ If you are not taking a prescription pain medicine, ask your doctor if you can take an over-the-counter medicine. · Take short walks several times a day. You can start with 5 to 10 minutes, 3 or 4 times a day, and work up to longer walks. Walk on level surfaces and avoid hills and stairs until your back is better. · Return to work and other activities as soon as you can. Continued rest without activity is usually not good for your back. · To prevent future back pain, do exercises to stretch and strengthen your back and stomach. Learn how to use good posture, safe lifting techniques, and proper body mechanics. When should you call for help? Call your doctor now or seek immediate medical care if:    · You have new or worsening numbness in your legs.     · You have new or worsening weakness in your legs. (This could make it hard to stand up.)     · You lose control of your bladder or bowels.    Watch closely for changes in your health, and be sure to contact your doctor if:    · You have a fever, lose weight, or don't feel well.     · You do not get better as expected. Where can you learn more? Go to http://bethany-nia.info/. Enter W296 in the search box to learn more about \"Back Pain: Care Instructions. \"  Current as of: November 29, 2017  Content Version: 11.7  © 2052-0486 [employer].  Care instructions adapted under license by [employer] (which disclaims liability or warranty for this information). If you have questions about a medical condition or this instruction, always ask your healthcare professional. [address] any warranty or liability for your use of this information.

## 2018-08-03 LAB — HCG UR QL: NEGATIVE

## 2018-11-20 ENCOUNTER — HOSPITAL ENCOUNTER (EMERGENCY)
Age: 33
Discharge: HOME | End: 2018-11-20
Attending: EMERGENCY MEDICINE
Payer: MEDICAID

## 2018-11-20 VITALS
OXYGEN SATURATION: 100 % | SYSTOLIC BLOOD PRESSURE: 136 MMHG | BODY MASS INDEX: 26.36 KG/M2 | HEART RATE: 83 BPM | RESPIRATION RATE: 16 BRPM | TEMPERATURE: 98.8 F | DIASTOLIC BLOOD PRESSURE: 85 MMHG | WEIGHT: 173.94 LBS | HEIGHT: 68 IN

## 2018-11-20 DIAGNOSIS — J00 ACUTE NASOPHARYNGITIS: Primary | ICD-10-CM

## 2018-11-20 PROCEDURE — 99282 EMERGENCY DEPT VISIT SF MDM: CPT

## 2018-11-20 RX ORDER — BUTALBITAL, ACETAMINOPHEN AND CAFFEINE 300; 40; 50 MG/1; MG/1; MG/1
1 CAPSULE ORAL
Qty: 30 CAP | Refills: 0 | Status: SHIPPED | OUTPATIENT
Start: 2018-11-20 | End: 2019-02-22 | Stop reason: ALTCHOICE

## 2018-11-20 RX ORDER — AZELASTINE 1 MG/ML
2 SPRAY, METERED NASAL 2 TIMES DAILY
Qty: 1 BOTTLE | Refills: 0 | Status: SHIPPED | OUTPATIENT
Start: 2018-11-20 | End: 2019-03-21

## 2018-11-20 RX ORDER — PREDNISONE 50 MG/1
50 TABLET ORAL DAILY
Qty: 5 TAB | Refills: 0 | Status: SHIPPED | OUTPATIENT
Start: 2018-11-20 | End: 2018-11-25

## 2018-11-20 RX ORDER — CODEINE PHOSPHATE AND GUAIFENESIN 10; 100 MG/5ML; MG/5ML
10 SOLUTION ORAL
Qty: 350 ML | Refills: 0 | Status: SHIPPED | OUTPATIENT
Start: 2018-11-20 | End: 2019-03-21

## 2018-11-20 NOTE — ED NOTES
DEBO Mercado gave and reviewed discharge instructions with the patient. The patient verbalized understanding. The patient was given opportunity for questions. Patient discharged in stable condition to the waiting room via ambulatory with steady gait.

## 2018-11-21 NOTE — ED PROVIDER NOTES
EMERGENCY DEPARTMENT HISTORY AND PHYSICAL EXAM 
 
 
Date: 11/20/2018 Patient Name: Lukasz Cee History of Presenting Illness Chief Complaint Patient presents with  
 Headache  
  Pt c.o headache, congestion, and productive cough x 3 days  Nasal Congestion  Cough History Provided By: Patient HPI: Lukasz Cee, 29 y.o. female with PMHx significant for depression, anxiety, and migraines, presents ambulatory from home to the ED with cc of nasal congestion, cough, and headache for the last three days. She states that the nasal congestion is giving her a migraine. She tried several OTC medications without relief. Pt denies fevers, chills, night sweats, chest pain, pressure, SOB, abdominal pain, n/v/d, melena, hematuria, dysuria, constipation, dizziness, and syncope. No nuchal rigidity. PCP: Dinesh Hughes MD 
 
Current Outpatient Medications Medication Sig Dispense Refill  predniSONE (DELTASONE) 50 mg tablet Take 1 Tab by mouth daily for 5 days. 5 Tab 0  
 azelastine (ASTELIN) 137 mcg (0.1 %) nasal spray 2 Sprays by Both Nostrils route two (2) times a day. Use in each nostril as directed 1 Bottle 0  
 butalbital-acetaminophen-caff (FIORICET) -40 mg per capsule Take 1 Cap by mouth every four (4) hours as needed for Pain or Headache. 30 Cap 0  
 guaiFENesin-codeine (ROBITUSSIN AC) 100-10 mg/5 mL solution Take 10 mL by mouth nightly as needed for Cough. Max Daily Amount: 10 mL. 350 mL 0  
 methocarbamol (ROBAXIN-750) 750 mg tablet Take 1 Tab by mouth four (4) times daily. 20 Tab 0  cyclobenzaprine (FLEXERIL) 10 mg tablet Take 1 Tab by mouth three (3) times daily as needed for Muscle Spasm(s). 20 Tab 0  
 albuterol (PROVENTIL HFA, VENTOLIN HFA, PROAIR HFA) 90 mcg/actuation inhaler Take 2 Puffs by inhalation every four (4) hours as needed for Wheezing.  1 Inhaler 1  
 albuterol (PROVENTIL VENTOLIN) 2.5 mg /3 mL (0.083 %) nebulizer solution 3 mL by Nebulization route every four (4) hours as needed for Wheezing. 24 Each 0 Past History Past Medical History: 
Past Medical History:  
Diagnosis Date  Asthma  Delivery normal   
 Depression  Ectopic pregnancy  Miscarriage  Neurological disorder   
 migraines  Other ill-defined conditions(799.89)   
 ectopic pregnancy  Psychiatric disorder   
 anger Past Surgical History: 
Past Surgical History:  
Procedure Laterality Date [address] UNLISTED  HX CHOLECYSTECTOMY  HX GYN    
 right oophetrecomy  HX HERNIA REPAIR  14 Open repair right inguinal hernia with mesh  HX ORTHOPAEDIC Right arm/leg Family History: 
Family History Problem Relation Age of Onset  Cancer Mother   
     breast  
 Hypertension Mother  Thyroid Disease Mother  Sickle Cell Trait Father  Asthma Sister  Asthma Brother  Asthma Sister  Asthma Sister  Asthma Sister  Asthma Sister  Asthma Sister  Asthma Brother  Asthma Brother  Asthma Brother  Asthma Brother  Sickle Cell Anemia Brother  Asthma Brother  Diabetes Paternal Grandmother Social History: 
Social History Tobacco Use  Smoking status: Former Smoker Packs/day: 0.25 Types: Cigarettes Last attempt to quit: 2016 Years since quittin.6  Smokeless tobacco: Never Used  Tobacco comment: social smoker Substance Use Topics  Alcohol use: Yes Comment: occasionally  Drug use: No  
 
 
Allergies: Allergies Allergen Reactions  Ibuprofen Hives and Itching Review of Systems Review of Systems Constitutional: Positive for fatigue. Negative for activity change, appetite change, chills, diaphoresis, fever and unexpected weight change. HENT: Positive for congestion, postnasal drip, sinus pressure and sneezing. Negative for ear pain, rhinorrhea, sore throat and tinnitus. Eyes: Negative for photophobia, pain, discharge and visual disturbance. Respiratory: Positive for cough. Negative for apnea, choking, chest tightness, shortness of breath, wheezing and stridor. Cardiovascular: Negative for chest pain, palpitations and leg swelling. Gastrointestinal: Negative for abdominal pain, constipation, diarrhea, nausea and vomiting. Endocrine: Negative for polydipsia, polyphagia and polyuria. Genitourinary: Negative for decreased urine volume, dyspareunia, dysuria, enuresis, flank pain, frequency, hematuria and urgency. Musculoskeletal: Negative for arthralgias, back pain, gait problem, myalgias and neck pain. Skin: Negative for color change, pallor, rash and wound. Allergic/Immunologic: Negative for immunocompromised state. Neurological: Negative for dizziness, seizures, syncope, weakness, light-headedness and headaches. Hematological: Does not bruise/bleed easily. Psychiatric/Behavioral: Negative for agitation and confusion. The patient is not nervous/anxious. Physical Exam  
Physical Exam  
Constitutional: She is oriented to person, place, and time. She appears well-developed and well-nourished. No distress. HENT:  
Head: Normocephalic. Right Ear: External ear normal.  
Left Ear: External ear normal.  
Nose: Mucosal edema present. Mouth/Throat: Oropharynx is clear and moist and mucous membranes are normal. No oropharyngeal exudate, posterior oropharyngeal edema, posterior oropharyngeal erythema or tonsillar abscesses. Eyes: Conjunctivae and EOM are normal. Pupils are equal, round, and reactive to light. Right eye exhibits no discharge. Left eye exhibits no discharge. No scleral icterus. Neck: Normal range of motion. Neck supple. No JVD present. No tracheal deviation present. No thyromegaly present. Cardiovascular: Normal rate, regular rhythm, normal heart sounds and intact distal pulses. Exam reveals no gallop and no friction rub. No murmur heard. Pulmonary/Chest: Effort normal and breath sounds normal. No stridor. No respiratory distress. She has no decreased breath sounds. She has no wheezes. She has no rhonchi. She has no rales. She exhibits no tenderness. Abdominal: Soft. Bowel sounds are normal. She exhibits no distension and no mass. There is no tenderness. There is no rebound and no guarding. Musculoskeletal: Normal range of motion. She exhibits no edema or tenderness. Lymphadenopathy:  
  She has no cervical adenopathy. Neurological: She is alert and oriented to person, place, and time. She displays normal reflexes. No cranial nerve deficit. Coordination normal.  
Skin: Skin is warm and dry. No rash noted. She is not diaphoretic. No erythema. No pallor. Psychiatric: She has a normal mood and affect. Her behavior is normal. Judgment and thought content normal.  
Nursing note and vitals reviewed. Diagnostic Study Results Labs - No results found for this or any previous visit (from the past 12 hour(s)). Radiologic Studies - No orders to display CT Results  (Last 48 hours) None CXR Results  (Last 48 hours) None Medical Decision Making I am the first provider for this patient. I reviewed the vital signs, available nursing notes, past medical history, past surgical history, family history and social history. Vital Signs-Reviewed the patient's vital signs. Patient Vitals for the past 12 hrs: 
 Temp Pulse Resp BP SpO2  
11/20/18 1712 98.8 °F (37.1 °C) 83 16 136/85 100 % Pulse Oximetry Analysis - 100% on RA Cardiac Monitor:  
Rate: 83 bpm 
 
Records Reviewed: Nursing Notes and Old Medical Records Provider Notes (Medical Decision Making): I 
 
 
ED Course:  
Initial assessment performed.  The patients presenting problems have been discussed, and they are in agreement with the care plan formulated and outlined with them. I have encouraged them to ask questions as they arise throughout their visit. Stable, ambulatory pt in [facility] Critical Care Time:  
0 Disposition: 
Discharge to home with PCP PLAN: 
1. Discharge Medication List as of 11/20/2018  5:31 PM  
  
START taking these medications Details  
predniSONE (DELTASONE) 50 mg tablet Take 1 Tab by mouth daily for 5 days. , Normal, Disp-5 Tab, R-0  
  
azelastine (ASTELIN) 137 mcg (0.1 %) nasal spray 2 Sprays by Both Nostrils route two (2) times a day. Use in each nostril as directed, Normal, Disp-1 Bottle, R-0  
  
guaiFENesin-codeine (ROBITUSSIN AC) 100-10 mg/5 mL solution Take 10 mL by mouth nightly as needed for Cough. Max Daily Amount: 10 mL., Print, Disp-350 mL, R-0  
  
  
CONTINUE these medications which have CHANGED Details  
butalbital-acetaminophen-caff (FIORICET) -40 mg per capsule Take 1 Cap by mouth every four (4) hours as needed for Pain or Headache., Print, Disp-30 Cap, R-0  
  
  
CONTINUE these medications which have NOT CHANGED Details  
methocarbamol (ROBAXIN-750) 750 mg tablet Take 1 Tab by mouth four (4) times daily. , Print, Disp-20 Tab, R-0  
  
cyclobenzaprine (FLEXERIL) 10 mg tablet Take 1 Tab by mouth three (3) times daily as needed for Muscle Spasm(s). , Print, Disp-20 Tab, R-0  
  
albuterol (PROVENTIL HFA, VENTOLIN HFA, PROAIR HFA) 90 mcg/actuation inhaler Take 2 Puffs by inhalation every four (4) hours as needed for Wheezing., Print, Disp-1 Inhaler, R-1  
  
albuterol (PROVENTIL VENTOLIN) 2.5 mg /3 mL (0.083 %) nebulizer solution 3 mL by Nebulization route every four (4) hours as needed for Wheezing., Print, Disp-24 Each, R-0  
  
  
STOP taking these medications  
  
 lidocaine (LIDODERM) 5 % Comments:  
Reason for Stopping:   
   
 traMADol (ULTRAM) 50 mg tablet Comments:  
Reason for Stopping:   
   
 HYDROcodone-acetaminophen (NORCO) 5-325 mg per tablet Comments:  
Reason for Stopping: predniSONE (STERAPRED DS) 10 mg dose pack Comments:  
Reason for Stoppin.  
Follow-up Information Follow up With Specialties Details Why Contact Info Checo Cruz MD Family Practice In 2 days  [address] [place] [address] 
[phone] [facility] EMERGENCY DEPT Emergency Medicine  As needed, If symptoms worsen [address] [facility] 
[phone] Return to ED if worse Diagnosis Clinical Impression: 1. Acute nasopharyngitis Attestations: 
 
Arpan Purdy NP 
7:35 PM

## 2019-02-22 ENCOUNTER — HOSPITAL ENCOUNTER (EMERGENCY)
Age: 34
Discharge: HOME | End: 2019-02-22
Attending: EMERGENCY MEDICINE
Payer: MEDICAID

## 2019-02-22 VITALS
HEART RATE: 80 BPM | OXYGEN SATURATION: 100 % | WEIGHT: 177 LBS | DIASTOLIC BLOOD PRESSURE: 82 MMHG | HEIGHT: 68 IN | BODY MASS INDEX: 26.83 KG/M2 | RESPIRATION RATE: 18 BRPM | SYSTOLIC BLOOD PRESSURE: 140 MMHG | TEMPERATURE: 97.7 F

## 2019-02-22 DIAGNOSIS — A59.9 TRICHOMONIASIS: Primary | ICD-10-CM

## 2019-02-22 LAB
APPEARANCE UR: CLEAR
BACTERIA URNS QL MICRO: ABNORMAL /HPF
BILIRUB UR QL: NEGATIVE
CLUE CELLS VAG QL WET PREP: NORMAL
COLOR UR: ABNORMAL
EPITH CASTS URNS QL MICRO: ABNORMAL /LPF
GLUCOSE UR STRIP.AUTO-MCNC: NEGATIVE MG/DL
HCG UR QL: NEGATIVE
HGB UR QL STRIP: ABNORMAL
KETONES UR QL STRIP.AUTO: NEGATIVE MG/DL
KOH PREP SPEC: NORMAL
LEUKOCYTE ESTERASE UR QL STRIP.AUTO: ABNORMAL
NITRITE UR QL STRIP.AUTO: NEGATIVE
PH UR STRIP: 6.5 [PH] (ref 5–8)
PROT UR STRIP-MCNC: NEGATIVE MG/DL
RBC #/AREA URNS HPF: ABNORMAL /HPF (ref 0–5)
SERVICE CMNT-IMP: NORMAL
SP GR UR REFRACTOMETRY: >1.03 (ref 1–1.03)
T VAGINALIS VAG QL WET PREP: NORMAL
TRICHOMONAS UR QL MICRO: PRESENT
UA: UC IF INDICATED,UAUC: ABNORMAL
UROBILINOGEN UR QL STRIP.AUTO: 1 EU/DL (ref 0.2–1)
WBC URNS QL MICRO: ABNORMAL /HPF (ref 0–4)

## 2019-02-22 PROCEDURE — 87210 SMEAR WET MOUNT SALINE/INK: CPT

## 2019-02-22 PROCEDURE — 99284 EMERGENCY DEPT VISIT MOD MDM: CPT

## 2019-02-22 PROCEDURE — 96372 THER/PROPH/DIAG INJ SC/IM: CPT

## 2019-02-22 PROCEDURE — 87086 URINE CULTURE/COLONY COUNT: CPT

## 2019-02-22 PROCEDURE — 87491 CHLMYD TRACH DNA AMP PROBE: CPT

## 2019-02-22 PROCEDURE — 81001 URINALYSIS AUTO W/SCOPE: CPT

## 2019-02-22 PROCEDURE — 74011250636 HC RX REV CODE- 250/636: Performed by: PHYSICIAN ASSISTANT

## 2019-02-22 PROCEDURE — 74011250637 HC RX REV CODE- 250/637: Performed by: PHYSICIAN ASSISTANT

## 2019-02-22 PROCEDURE — 81025 URINE PREGNANCY TEST: CPT

## 2019-02-22 RX ORDER — METRONIDAZOLE 500 MG/1
2000 TABLET ORAL
Status: COMPLETED | OUTPATIENT
Start: 2019-02-22 | End: 2019-02-22

## 2019-02-22 RX ORDER — ONDANSETRON 4 MG/1
4 TABLET, ORALLY DISINTEGRATING ORAL
Status: COMPLETED | OUTPATIENT
Start: 2019-02-22 | End: 2019-02-22

## 2019-02-22 RX ORDER — ACETAMINOPHEN 325 MG/1
650 TABLET ORAL
Qty: 20 TAB | Refills: 0 | Status: SHIPPED | OUTPATIENT
Start: 2019-02-22 | End: 2019-03-21

## 2019-02-22 RX ORDER — DOXYCYCLINE 100 MG/1
100 CAPSULE ORAL 2 TIMES DAILY
Qty: 28 CAP | Refills: 0 | Status: SHIPPED | OUTPATIENT
Start: 2019-02-22 | End: 2019-03-08

## 2019-02-22 RX ORDER — AZITHROMYCIN 500 MG/1
1000 TABLET, FILM COATED ORAL
Status: COMPLETED | OUTPATIENT
Start: 2019-02-22 | End: 2019-02-22

## 2019-02-22 RX ADMIN — ONDANSETRON 4 MG: 4 TABLET, ORALLY DISINTEGRATING ORAL at 23:29

## 2019-02-22 RX ADMIN — AZITHROMYCIN 1000 MG: 500 TABLET, FILM COATED ORAL at 22:38

## 2019-02-22 RX ADMIN — LIDOCAINE HYDROCHLORIDE 250 MG: 10 INJECTION, SOLUTION EPIDURAL; INFILTRATION; INTRACAUDAL; PERINEURAL at 22:38

## 2019-02-22 RX ADMIN — METRONIDAZOLE 2000 MG: 500 TABLET ORAL at 23:30

## 2019-02-23 NOTE — DISCHARGE INSTRUCTIONS
Patient Education        Trichomoniasis: Care Instructions  Your Care Instructions  Trichomoniasis is a sexually transmitted infection (STI) that is spread by having sex with an infected partner. Trichomoniasis is commonly called trich (say \"trick\"). In women, trich may cause vaginal itching and a smelly discharge. But in many cases, especially in men, there are no symptoms. Margie Welch is treated so that you do not spread it to others. Both you and your sex partner or partners should be treated at the same time so you do not infect each other again. Trich may cause problems with pregnancy. Your doctor will talk with you about treatment for Trich if you are pregnant. Follow-up care is a key part of your treatment and safety. Be sure to make and go to all appointments, and call your doctor if you are having problems. It's also a good idea to know your test results and keep a list of the medicines you take. How can you care for yourself at home? · Take your antibiotics as directed. Do not stop taking them just because you feel better. You need to take the full course of antibiotics. · Do not have sex while you are being treated. If your doctor gave you a single dose of antibiotics, do not have sex for one week after being treated and until your partner also has been treated. · Tell your sex partner (or partners) that he or she will also need to be tested and treated. · Use a cold water compress or cool baths to relieve itching. To prevent trichomoniasis in the future  · Use latex condoms every time you have sex. Use them from the beginning to the end of sexual contact. · Talk to your partner before having sex. Find out if he or she has or is at risk for trich or any other STI. Keep in mind that a person may be able to spread an STI even if he or she does not have symptoms. · Do not have sex if you are being treated for trich or any other STI.   · Do not have sex with anyone who has symptoms of an STI, such as sores on the genitals or mouth. · Having one sex partner (who does not have STIs and does not have sex with anyone else) is a good way to avoid STIs. When should you call for help? Call your doctor now or seek immediate medical care if:    · You have unusual vaginal bleeding.     · You have a fever.     · You have new discharge from the vagina or penis.     · You have pelvic pain.    Watch closely for changes in your health, and be sure to contact your doctor if:    · You do not get better as expected.     · You have any new symptoms or your symptoms get worse. Where can you learn more? Go to http://bethany-nia.info/. Enter B129 in the search box to learn more about \"Trichomoniasis: Care Instructions. \"  Current as of: September 11, 2018  Content Version: 11.9  © 4256-4428 [employer]. Care instructions adapted under license by [employer] (which disclaims liability or warranty for this information). If you have questions about a medical condition or this instruction, always ask your healthcare professional. [place] [address] any warranty or liability for your use of this information. Patient Education        Exposure to Sexually Transmitted Infections: Care Instructions  Your Care Instructions  Sexually transmitted infections (STIs) are those diseases spread by sexual contact. There are at least 20 different STIs, including chlamydia, gonorrhea, syphilis, and human immunodeficiency virus (HIV), which causes AIDS. Bacteria-caused STIs can be treated and cured. STIs caused by viruses, such as HIV, can be treated but not cured. Some STIs can reduce a woman's chances of getting pregnant in the future. STIs are spread during sexual contact, such as vaginal intercourse and oral or anal sex. Follow-up care is a key part of your treatment and safety. Be sure to make and go to all appointments, and call your doctor if you are having problems.  It's also a good idea to know your test results and keep a list of the medicines you take. How can you care for yourself at home? · Your doctor may have given you a shot of antibiotics. If your doctor prescribed antibiotic pills, take them as directed. Do not stop taking them just because you feel better. You need to take the full course of antibiotics. · Do not have sexual contact while you have symptoms of an STI or are being treated for an STI. · Tell your sex partner (or partners) that he or she will need treatment. · If you are a woman, do not douche. Douching changes the normal balance of bacteria in the vagina and may spread an infection up into your reproductive organs. To prevent exposure to STIs in the future  · Use latex condoms every time you have sex. Use them from the beginning to the end of sexual contact. · Talk to your partner before you have sex. Find out if he or she has or is at risk for any STI. Keep in mind that a person may be able to spread an STI even if he or she does not have symptoms. · Do not have sex if you are being treated for an STI. · Do not have sex with anyone who has symptoms of an STI, such as sores on the genitals or mouth. · Having one sex partner (who does not have STIs and does not have sex with anyone else) is a good way to avoid STIs. When should you call for help? Call your doctor now or seek immediate medical care if:    · You have new pain in your belly or pelvis.     · You have symptoms of a urinary tract infection. These may include:  ? Pain or burning when you urinate. ? A frequent need to urinate without being able to pass much urine. ? Pain in the flank, which is just below the rib cage and above the waist on either side of the back. ? Blood in your urine.   ? A fever.     · You have new or worsening pain or swelling in the scrotum.    Watch closely for changes in your health, and be sure to contact your doctor if:    · You have unusual vaginal bleeding.     · You have a discharge from the vagina or penis.     · You have any new symptoms, such as sores, bumps, rashes, blisters, or warts.     · You have itching, tingling, pain, or burning in the genital or anal area.     · You think you may have an STI. Where can you learn more? Go to http://bethany-nia.info/. Enter D537 in the search box to learn more about \"Exposure to Sexually Transmitted Infections: Care Instructions. \"  Current as of: September 11, 2018  Content Version: 11.9  © 6561-8436 [employer]. Care instructions adapted under license by [employer] (which disclaims liability or warranty for this information). If you have questions about a medical condition or this instruction, always ask your healthcare professional. [address] any warranty or liability for your use of this information.

## 2019-02-23 NOTE — ED PROVIDER NOTES
EMERGENCY DEPARTMENT HISTORY AND PHYSICAL EXAM 
 
 
Date: 2/22/2019 Patient Name: Waleska Simpson History of Presenting Illness HPI: Waleska Simpson is a 29 y.o. female with PMHx of depression/anxiety, ectopic pregnancy, presents to the ED for urinary urgency/frequency, vaginal itching and right sided pelvic pain x 3 days. Pt says that her pain is a 0/10 currently, 7/10 when present, sharp, non radiating pain. Pt says she just came off of her cycle yesterday. Pt denies pregnancy, abd pain, n/v, fever/chills, vaginal d/c, among other assoc sx's. PCP: Adali Abdi MD 
 
Current Outpatient Medications Medication Sig Dispense Refill  acetaminophen (TYLENOL) 325 mg tablet Take 2 Tabs by mouth every four (4) hours as needed for Pain. 20 Tab 0  
 doxycycline (MONODOX) 100 mg capsule Take 1 Cap by mouth two (2) times a day for 14 days. 28 Cap 0  
 azelastine (ASTELIN) 137 mcg (0.1 %) nasal spray 2 Sprays by Both Nostrils route two (2) times a day. Use in each nostril as directed 1 Bottle 0  
 guaiFENesin-codeine (ROBITUSSIN AC) 100-10 mg/5 mL solution Take 10 mL by mouth nightly as needed for Cough. Max Daily Amount: 10 mL. 350 mL 0  
 methocarbamol (ROBAXIN-750) 750 mg tablet Take 1 Tab by mouth four (4) times daily. 20 Tab 0  cyclobenzaprine (FLEXERIL) 10 mg tablet Take 1 Tab by mouth three (3) times daily as needed for Muscle Spasm(s). 20 Tab 0  
 albuterol (PROVENTIL HFA, VENTOLIN HFA, PROAIR HFA) 90 mcg/actuation inhaler Take 2 Puffs by inhalation every four (4) hours as needed for Wheezing. 1 Inhaler 1  
 albuterol (PROVENTIL VENTOLIN) 2.5 mg /3 mL (0.083 %) nebulizer solution 3 mL by Nebulization route every four (4) hours as needed for Wheezing. 24 Each 0 Past History Past Medical History: 
Past Medical History:  
Diagnosis Date  Asthma  Delivery normal   
 Depression  Ectopic pregnancy  Miscarriage  Neurological disorder   
 migraines  Other ill-defined conditions(799.89)   
 ectopic pregnancy  Psychiatric disorder   
 anger Past Surgical History: 
Past Surgical History:  
Procedure Laterality Date [address] UNLISTED  HX CHOLECYSTECTOMY  HX GYN    
 right oophetrecomy  HX HERNIA REPAIR  14 Open repair right inguinal hernia with mesh  HX ORTHOPAEDIC Right arm/leg Family History: 
Family History Problem Relation Age of Onset  Cancer Mother   
     breast  
 Hypertension Mother  Thyroid Disease Mother  Sickle Cell Trait Father  Asthma Sister  Asthma Brother  Asthma Sister  Asthma Sister  Asthma Sister  Asthma Sister  Asthma Sister  Asthma Brother  Asthma Brother  Asthma Brother  Asthma Brother  Sickle Cell Anemia Brother  Asthma Brother  Diabetes Paternal Grandmother Social History: 
Social History Tobacco Use  Smoking status: Former Smoker Packs/day: 0.25 Types: Cigarettes Last attempt to quit: 2016 Years since quittin.9  Smokeless tobacco: Never Used  Tobacco comment: social smoker Substance Use Topics  Alcohol use: Yes Comment: occasionally  Drug use: No  
 
 
Allergies: Allergies Allergen Reactions  Ibuprofen Hives and Itching Review of Systems Review of Systems Constitutional: Negative for activity change, appetite change, chills, diaphoresis, fatigue, fever and unexpected weight change. HENT: Negative for congestion, dental problem, ear pain, facial swelling, postnasal drip, sinus pressure, sinus pain, sore throat, trouble swallowing and voice change. Eyes: Negative for photophobia and pain. Respiratory: Negative for cough, shortness of breath and wheezing. Cardiovascular: Negative for chest pain, palpitations and leg swelling.   
Gastrointestinal: Negative for abdominal pain, blood in stool, constipation, diarrhea, nausea and vomiting. Endocrine: Negative for polydipsia, polyphagia and polyuria. Genitourinary: Positive for pelvic pain. Negative for dysuria, flank pain, frequency, genital sores, hematuria, menstrual problem, urgency, vaginal bleeding and vaginal discharge. Denies pregnancy, reports vaginal itching Musculoskeletal: Negative for back pain, joint swelling, myalgias, neck pain and neck stiffness. Skin: Negative for color change, pallor, rash and wound. Neurological: Negative for dizziness, syncope, speech difficulty, weakness, light-headedness, numbness and headaches. Physical Exam  
 
Vitals:  
 02/22/19 2200 BP: 140/82 Pulse: 80 Resp: 18 Temp: 97.7 °F (36.5 °C) SpO2: 100% Weight: 80.3 kg (177 lb) Height: 5' 8\" (1.727 m) Physical Exam  
Constitutional: She is oriented to person, place, and time. She appears well-developed and well-nourished. No distress. Cardiovascular: Normal rate, regular rhythm, normal heart sounds and intact distal pulses. Pulmonary/Chest: Effort normal and breath sounds normal. No respiratory distress. Abdominal: Soft. Bowel sounds are normal. She exhibits no distension and no mass. There is no tenderness. There is no rebound and no guarding. Neurological: She is alert and oriented to person, place, and time. Skin: Skin is warm and dry. No rash noted. She is not diaphoretic. No erythema. No pallor. Psychiatric: She has a normal mood and affect. Her behavior is normal. Judgment and thought content normal.  
Nursing note and vitals reviewed. Diagnostic Study Results Labs - No results found for this or any previous visit (from the past 12 hour(s)). Radiologic Studies - No orders to display CT Results  (Last 48 hours) None CXR Results  (Last 48 hours) None Medical Decision Making I am the first provider for this patient. I reviewed the vital signs, available nursing notes, past medical history, past surgical history, family history, social history ED Course:  
Initial assessment performed. The patients presenting problems have been discussed, and they are in agreement with the care plan formulated and outlined with them. I have encouraged them to ask questions as they arise throughout their visit. Vital Signs-Reviewed the patient's vital signs. Vitals:  
 02/22/19 2200 BP: 140/82 BP 1 Location: Left arm Pulse: 80 Resp: 18 Temp: 97.7 °F (36.5 °C) SpO2: 100% Weight: 80.3 kg (177 lb) Height: 5' 8\" (1.727 m) Medications Administered During ED Course Medications  
cefTRIAXone (ROCEPHIN) 250 mg in lidocaine (PF) (XYLOCAINE) 10 mg/mL (1 %) IM injection (250 mg IntraMUSCular Given 2/22/19 2238) azithromycin (ZITHROMAX) tablet 1,000 mg (1,000 mg Oral Given 2/22/19 2238) ondansetron (ZOFRAN ODT) tablet 4 mg (4 mg Oral Given 2/22/19 2329)  
metroNIDAZOLE (FLAGYL) tablet 2,000 mg (2,000 mg Oral Given 2/22/19 2330) Progress Note: On re evaluation pt is resting comfortably, is requesting discharge, and has no new complaints, changes, or physical findings. Disposition: D/c home DISCHARGE NOTE:  
I Counseled the patient on diagnosis and care plan. All available lab and imaging results have been reviewed by me and were discussed with the patient, including all incidental findings. The likelihood of other entities in the differential is insufficient to justify any further testing for them. This was explained to the patient. Patient agrees with plan and agrees to follow up with OBGYN as recommended, or return to the ED if their symptoms worsen. All medications were reviewed with the patient. All of pt's questions and concerns were addressed. The patient was advised that new or worsening symptoms would require further evaluation and should prompt immediate return to the Emergency Department.  Discharge instructions have been provided and explained to the patient, along with reasons to return to the ED. Patient voices understanding and is agreeable with the plan for discharge. Patient is ready to go home. Follow-up Information Follow up With Specialties Details Why Contact Info Rafael Rivera MD Family Practice Schedule an appointment as soon as possible for a visit  [address] [facility] [address] 
[phone] [facility] EMERGENCY DEPT Emergency Medicine Go to If symptoms worsen [address] OBGYN AT [facility] Obstetrics & Gynecology Schedule an appointment as soon as possible for a visit  [facility] [address] [facility] [address] 
[phone] Discharge Medication List as of 2/22/2019 10:59 PM  
  
START taking these medications Details  
acetaminophen (TYLENOL) 325 mg tablet Take 2 Tabs by mouth every four (4) hours as needed for Pain., Normal, Disp-20 Tab, R-0  
  
  
CONTINUE these medications which have NOT CHANGED Details  
azelastine (ASTELIN) 137 mcg (0.1 %) nasal spray 2 Sprays by Both Nostrils route two (2) times a day. Use in each nostril as directed, Normal, Disp-1 Bottle, R-0  
  
guaiFENesin-codeine (ROBITUSSIN AC) 100-10 mg/5 mL solution Take 10 mL by mouth nightly as needed for Cough. Max Daily Amount: 10 mL., Print, Disp-350 mL, R-0  
  
methocarbamol (ROBAXIN-750) 750 mg tablet Take 1 Tab by mouth four (4) times daily. , Print, Disp-20 Tab, R-0  
  
cyclobenzaprine (FLEXERIL) 10 mg tablet Take 1 Tab by mouth three (3) times daily as needed for Muscle Spasm(s). , Print, Disp-20 Tab, R-0  
  
albuterol (PROVENTIL HFA, VENTOLIN HFA, PROAIR HFA) 90 mcg/actuation inhaler Take 2 Puffs by inhalation every four (4) hours as needed for Wheezing., Print, Disp-1 Inhaler, R-1  
  
albuterol (PROVENTIL VENTOLIN) 2.5 mg /3 mL (0.083 %) nebulizer solution 3 mL by Nebulization route every four (4) hours as needed for Wheezing., Print, Disp-24 Each, R-0  
  
  
STOP taking these medications  
  
 butalbital-acetaminophen-caff (FIORICET) -40 mg per capsule Comments:  
Reason for Stopping:   
   
  
 
 
Provider Notes (Medical Decision Making): DDx: UTI, BV, candidiasis, G/C, HSV, trichomoniasis, PID, cervical polyp, rxn to douches/tampons, low concern for cervical or uterine ca. Procedures: 
Procedures Critical Care Time: none Diagnosis Clinical Impression: 1. Trichomoniasis

## 2019-02-23 NOTE — ED NOTES
Pt presents to ED ambulatory complaining of abdominal and vaginal pain. Pt is alert and oriented x 4, RR even and unlabored, skin is warm and dry. Assessment completed and pt updated on plan of care. Emergency Department Nursing Plan of Care The Nursing Plan of Care is developed from the Nursing assessment and Emergency Department Attending provider initial evaluation. The plan of care may be reviewed in the ED Provider note. The Plan of Care was developed with the following considerations:  
Patient / Family readiness to learn indicated by:verbalized understanding Persons(s) to be included in education: patient Barriers to Learning/Limitations:No 
 
Signed Ollie Chun RN   
2/23/2019   2:51 AM

## 2019-02-23 NOTE — ED TRIAGE NOTES
Pt with c/o lower abd pain and vaginal itching that started two days ago. Pt denies vaginal discharge.

## 2019-02-24 LAB
BACTERIA SPEC CULT: NORMAL
CC UR VC: NORMAL
SERVICE CMNT-IMP: NORMAL

## 2019-03-20 VITALS
OXYGEN SATURATION: 99 % | SYSTOLIC BLOOD PRESSURE: 140 MMHG | WEIGHT: 172 LBS | HEIGHT: 68 IN | TEMPERATURE: 98.7 F | HEART RATE: 83 BPM | DIASTOLIC BLOOD PRESSURE: 91 MMHG | BODY MASS INDEX: 26.07 KG/M2 | RESPIRATION RATE: 14 BRPM

## 2019-03-20 PROCEDURE — 99282 EMERGENCY DEPT VISIT SF MDM: CPT

## 2019-03-21 ENCOUNTER — HOSPITAL ENCOUNTER (EMERGENCY)
Age: 34
Discharge: HOME | End: 2019-03-21
Attending: EMERGENCY MEDICINE
Payer: MEDICAID

## 2019-03-21 DIAGNOSIS — S00.551A FOREIGN BODY IN LIP, INITIAL ENCOUNTER: Primary | ICD-10-CM

## 2019-03-21 PROCEDURE — 74011250636 HC RX REV CODE- 250/636: Performed by: EMERGENCY MEDICINE

## 2019-03-21 RX ORDER — CLINDAMYCIN HYDROCHLORIDE 150 MG/1
300 CAPSULE ORAL 3 TIMES DAILY
Qty: 30 CAP | Refills: 0 | Status: SHIPPED | OUTPATIENT
Start: 2019-03-21 | End: 2019-03-26

## 2019-03-21 RX ORDER — LIDOCAINE HYDROCHLORIDE 20 MG/ML
10 INJECTION, SOLUTION INFILTRATION; PERINEURAL ONCE
Status: COMPLETED | OUTPATIENT
Start: 2019-03-21 | End: 2019-03-21

## 2019-03-21 RX ADMIN — LIDOCAINE HYDROCHLORIDE 200 MG: 20 INJECTION, SOLUTION INFILTRATION; PERINEURAL at 00:57

## 2019-03-21 NOTE — LETTER
[facility] EMERGENCY DEPT 
[address] [facility] [id number] [phone] Work/School Note Date: 3/20/2019 To Whom It May concern: 
 
Maynor Denton was seen and treated today in the emergency room by the following provider(s): 
Attending Provider: Kenna Shepherd MD.   
 
Maynor Denton may return to work on 3/22/2019. Sincerely, oRmain Espinal MD

## 2019-03-21 NOTE — ED PROVIDER NOTES
EMERGENCY DEPARTMENT HISTORY AND PHYSICAL EXAM 
 
 
Date: 3/21/2019 Patient Name: Elizabeth Armendariz History of Presenting Illness Chief Complaint Patient presents with  Facial Pain History Provided By: Patient HPI: Elizabeth Armendariz, 29 y.o. female with PMHx significant for asthma, presents ambulatory to the ED for evaluation of mild to moderate R lip pain secondary to foreign body. Pt states she was attempting to change her upper lip piercing this morning and it became lodged in her upper lip. She notes she went back to the piercing place and they were unable to remove piercing so they advised she come to ED. She states she had it pierced 2 months ago. She denies any associated symptoms. She denies any alleviating or exacerbating factors. Pt specifically denies any numbness, weakness, fever, chills, CP, SOB, abd pain. There are no other complaints, changes, or physical findings at this time. PCP: Delphine Esqueda MD 
 
No current facility-administered medications on file prior to encounter. Current Outpatient Medications on File Prior to Encounter Medication Sig Dispense Refill  albuterol (PROVENTIL HFA, VENTOLIN HFA, PROAIR HFA) 90 mcg/actuation inhaler Take 2 Puffs by inhalation every four (4) hours as needed for Wheezing. 1 Inhaler 1  
 albuterol (PROVENTIL VENTOLIN) 2.5 mg /3 mL (0.083 %) nebulizer solution 3 mL by Nebulization route every four (4) hours as needed for Wheezing. 24 Each 0 Past History Past Medical History: 
Past Medical History:  
Diagnosis Date  Asthma  Delivery normal   
 Depression  Ectopic pregnancy  Miscarriage  Neurological disorder   
 migraines  Other ill-defined conditions(799.89)   
 ectopic pregnancy  Psychiatric disorder   
 anger Past Surgical History: 
Past Surgical History:  
Procedure Laterality Date [address] UNLISTED  HX CHOLECYSTECTOMY  HX GYN    
 right oophetrecomy  HX HERNIA REPAIR  14 Open repair right inguinal hernia with mesh  HX ORTHOPAEDIC Right arm/leg Family History: 
Family History Problem Relation Age of Onset  Cancer Mother   
     breast  
 Hypertension Mother  Thyroid Disease Mother  Sickle Cell Trait Father  Asthma Sister  Asthma Brother  Asthma Sister  Asthma Sister  Asthma Sister  Asthma Sister  Asthma Sister  Asthma Brother  Asthma Brother  Asthma Brother  Asthma Brother  Sickle Cell Anemia Brother  Asthma Brother  Diabetes Paternal Grandmother Social History: 
Social History Tobacco Use  Smoking status: Current Some Day Smoker Packs/day: 0.25 Types: Cigarettes Last attempt to quit: 2016 Years since quittin.9  Smokeless tobacco: Never Used  Tobacco comment: social smoker Substance Use Topics  Alcohol use: Yes Comment: occasionally  Drug use: No  
 
 
Allergies: Allergies Allergen Reactions  Ibuprofen Hives and Itching Review of Systems Review of Systems Constitutional: Negative. Negative for chills, fever and unexpected weight change. HENT: Negative. Negative for congestion and trouble swallowing. Eyes: Negative for discharge. Respiratory: Negative. Negative for cough, chest tightness and shortness of breath. Cardiovascular: Negative. Negative for chest pain. Gastrointestinal: Negative. Negative for abdominal distention, abdominal pain, constipation, diarrhea and nausea. Endocrine: Negative. Genitourinary: Negative. Negative for difficulty urinating, dysuria, frequency and urgency. Musculoskeletal: Negative. Negative for arthralgias and myalgias. Skin: Negative. Negative for color change. (+) fb in upper lip Allergic/Immunologic: Negative. Neurological: Negative. Negative for dizziness, speech difficulty and headaches. Hematological: Negative. Psychiatric/Behavioral: Negative. Negative for agitation and confusion. All other systems reviewed and are negative. Physical Exam  
Physical Exam  
Constitutional: She is oriented to person, place, and time. She appears well-developed and well-nourished. HENT:  
Head: Normocephalic and atraumatic. Eyes: Conjunctivae and EOM are normal.  
Neck: Neck supple. Cardiovascular: Normal rate, regular rhythm and intact distal pulses. Pulmonary/Chest: Effort normal. No respiratory distress. Abdominal: Soft. There is no tenderness. Musculoskeletal: Normal range of motion. She exhibits no deformity. Neurological: She is alert and oriented to person, place, and time. Skin: Skin is warm and dry. Lip ring in R upper lip with swelling Psychiatric: She has a normal mood and affect. Her behavior is normal. Thought content normal.  
Vitals reviewed. Medical Decision Making I am the first provider for this patient. I reviewed the vital signs, available nursing notes, past medical history, past surgical history, family history and social history. Vital Signs-Reviewed the patient's vital signs. Patient Vitals for the past 12 hrs: 
 Temp Pulse Resp BP SpO2  
03/20/19 2253 98.7 °F (37.1 °C) 83 14 (!) 140/91 99 % Records Reviewed: Nursing Notes and Old Medical Records Provider Notes (Medical Decision Making): Foreign body in R upper lip ED Course:  
Initial assessment performed. The patients presenting problems have been discussed, and they are in agreement with the care plan formulated and outlined with them. I have encouraged them to ask questions as they arise throughout their visit. Procedure Note - Foreign Body Cavity: 
1:02 AM 
Performed by: Alphonso Mercado MD 
Foreign body was seen in the lip. 1.5 cc 2% lidocaine administered. Foreign body removed with forceps without difficulty. Estimated blood loss: < 5 cc The procedure took 1-15 minutes, and pt tolerated well. Critical Care Time:  
none Disposition: 
DISCHARGE 
1:14 AM 
The patient has been re-evaluated and is ready for discharge. Reviewed available results with patient. Counseled pt on diagnosis and care plan. Pt has expressed understanding, and all questions have been answered. Pt agrees with plan and agrees to follow up as recommended, or return to the ED if their symptoms worsen. Discharge instructions have been provided and explained to the pt, along with reasons to return to the ED. PLAN: 
1. Current Discharge Medication List  
  
START taking these medications Details  
clindamycin (CLEOCIN) 150 mg capsule Take 2 Caps by mouth three (3) times daily for 5 days. Qty: 30 Cap, Refills: 0  
  
  
 
2. Follow-up Information Follow up With Specialties Details Why Contact Info Evgeny Benavidez MD [address] 
[phone] [facility] EMERGENCY DEPT Emergency Medicine  As needed, If symptoms worsen [address] Return to ED if worse Diagnosis Clinical Impression: 1. Foreign body in lip, initial encounter Attestations: This note is prepared by Michael Keith, acting as Scribe for Robert Sood MD. 
 
Robert Sood MD: The scribe's documentation has been prepared under my direction and personally reviewed by me in its entirety. I confirm that the note above accurately reflects all work, treatment, procedures, and medical decision making performed by me.

## 2019-03-21 NOTE — ED TRIAGE NOTES
Pt reports R sided facial pain, swelling, and bleeding from piercing X today. Pt reports going to a shop to have lip piercing removed.

## 2019-03-21 NOTE — DISCHARGE INSTRUCTIONS
Patient Education        Object in the Skin: Care Instructions  Your Care Instructions  Small objects (splinters) of wood, metal, glass, or plastic can become embedded in the skin. Thorns from [place] and other plants also can prick or become stuck in the skin. Splinters can cause an infection if they are not removed. Your doctor probably removed the object and cleaned the skin well. Your doctor may have given you antibiotics to prevent infection and a tetanus shot if you had not had one in the last 5 years or do not know when you had your last one. For a few days, you may have pain and itching in the wound where the object was removed. Follow-up care is a key part of your treatment and safety. Be sure to make and go to all appointments, and call your doctor if you are having problems. It's also a good idea to know your test results and keep a list of the medicines you take. How can you care for yourself at home? · If your doctor told you how to care for your wound, follow your doctor's instructions. If you did not get instructions, follow this general advice:  ? Wash the wound with clean water 2 times a day. Don't use hydrogen peroxide or alcohol, which can slow healing. ? You may cover the wound with a thin layer of petroleum jelly, such as Vaseline, and a nonstick bandage. ? Apply more petroleum jelly and replace the bandage as needed. · Your doctor may have used medicine to numb your skin. When it wears off, your pain may return. Take an over-the-counter pain medicine, such as acetaminophen (Tylenol), ibuprofen (Advil, Motrin), or naproxen (Aleve). Read and follow all instructions on the label. · Do not take two or more pain medicines at the same time unless the doctor told you to. Many pain medicines have acetaminophen, which is Tylenol. Too much acetaminophen (Tylenol) can be harmful. · If your doctor prescribed antibiotics, take them as directed. Do not stop taking them just because you feel better. You need to take the full course of antibiotics. · After 2 or 3 days, if your swelling is gone, apply a heating pad set on low or a warm cloth to your wound area. Some doctors suggest that you go back and forth between hot and cold. Put a thin cloth between the heating pad and your skin. · It may help to prop up the affected part of your body on a pillow anytime you sit or lie down during the next 3 days. Try to keep it above the level of your heart. This will help reduce swelling. · Your wound may itch or feel irritated. A little redness and swelling is normal. Do not scratch or rub the wound. When should you call for help? Call your doctor now or seek immediate medical care if:    · The skin near the wound is cool or pale or changes color.     · You have tingling, weakness, or numbness in the area near the wound.     · The wound starts to bleed, and blood soaks the bandage. Oozing small amounts of blood is normal.     · You have trouble moving a limb near the wound.     · You have signs of infection, such as:  ? Increased pain, swelling, warmth, or redness. ? Red streaks leading from the wound. ? Pus draining from the wound. ? A fever.    Watch closely for changes in your health, and be sure to contact your doctor if:    · You do not get better as expected. Where can you learn more? Go to http://bethany-nia.info/. Enter Alexandria Dickerson in the search box to learn more about \"Object in the Skin: Care Instructions. \"  Current as of: September 23, 2018  Content Version: 11.9  © 2514-6702 [employer]. Care instructions adapted under license by [employer] (which disclaims liability or warranty for this information). If you have questions about a medical condition or this instruction, always ask your healthcare professional. [address] any warranty or liability for your use of this information.   Patient Education        Lip Laceration: Care Instructions  Your Care Instructions    A cut (laceration) on your lip can be on the outside of your mouth, or it may include the skin inside your mouth. Cuts to the lip usually heal quickly. But your lip may be sore while it heals. The doctor used stitches to close the cut. Using stitches helps the cut heal. The doctor may also have called in a specialist, such as a plastic surgeon, to close the cut. Your cut may leave a scar that will fade over time. The doctor took special care to close the cut so that the edges line up. This can help reduce scarring. If the cut went deep and through the skin, the doctor may have put in two layers of stitches. The deeper layer brings the deep part of the cut together. These stitches will dissolve and don't need to be removed. The stitches in the upper layer are the ones you see on the cut. You may have strips of tape covering part of the cut. Your stitches may dissolve on their own. Or the doctor may need to remove the stitches in about 3 to 5 days. The doctor has checked you carefully, but problems can develop later. If you notice any problems or new symptoms, get medical treatment right away. Follow-up care is a key part of your treatment and safety. Be sure to make and go to all appointments, and call your doctor if you are having problems. It's also a good idea to know your test results and keep a list of the medicines you take. How can you care for yourself at home? · Put ice or a cold pack on the area for 10 to 20 minutes at a time. Put a thin cloth between the ice and your skin. · If the cut is inside your mouth:  ? Rinse your mouth with warm salt water right after meals. Saltwater rinses may help healing. To make a saltwater solution for rinsing the mouth, mix 1 tsp of salt in 1 cup of warm water. ? Eat soft foods that are easy to chew. Avoid foods that might sting. These include salty or spicy foods, citrus fruits or juices, and tomatoes.   ? Try using a topical medicine, such as Orabase, to reduce mouth pain. · Do not use a straw until your lip is healed. · If your doctor told you how to care for your cut, follow your doctor's instructions. If you did not get instructions, follow this general advice:  ? After the first 24 to 48 hours, wash around the cut with clean water 2 times a day. Don't use hydrogen peroxide or alcohol, which can slow healing. · If you have strips of tape on the cut, leave the tape on for a week or until it falls off. · If your doctor prescribed antibiotics, take them as directed. Do not stop taking them just because you feel better. You need to take the full course of antibiotics. · Be safe with medicines. Read and follow all instructions on the label. ? If the doctor gave you a prescription medicine for pain, take it as prescribed. ? If you are not taking a prescription pain medicine, ask your doctor if you can take an over-the-counter medicine. · Avoid any activity that could cause the cut to reopen. · Do not remove the stitches on your own. Your doctor will tell you when to come back to have the stitches removed. When should you call for help? Call your doctor now or seek immediate medical care if:    · The cut starts to bleed. Oozing small amounts of blood is normal.     · You have symptoms of infection, such as:  ? Increased pain, swelling, warmth, or redness around the cut.  ? Red streaks leading from the cut.  ? Pus draining from the cut.  ? A fever.    Watch closely for changes in your health, and be sure to contact your doctor if:    · The cut reopens.     · You do not get better as expected. Where can you learn more? Go to http://bethany-nia.info/. Enter [id number] in the search box to learn more about \"Lip Laceration: Care Instructions. \"  Current as of: September 23, 2018  Content Version: 11.9  © 4656-4045 [employer].  Care instructions adapted under license by [employer] (which disclaims liability or warranty for this information). If you have questions about a medical condition or this instruction, always ask your healthcare professional. [address] any warranty or liability for your use of this information.

## 2019-03-21 NOTE — ED NOTES
Pt presents to ED ambulatory complaining of facial piercing embedded in her uppe R side lip. Pt is alert and oriented x 4, RR even and unlabored, skin is warm and dry. Assessment completed and pt updated on plan of care. Emergency Department Nursing Plan of Care The Nursing Plan of Care is developed from the Nursing assessment and Emergency Department Attending provider initial evaluation. The plan of care may be reviewed in the ED Provider note. The Plan of Care was developed with the following considerations:  
Patient / Family readiness to learn indicated by:verbalized understanding Persons(s) to be included in education: patient Barriers to Learning/Limitations:No 
 
Signed Darren Slaughter RN   
3/21/2019   1:11 AM

## 2019-03-21 NOTE — LETTER
[facility] EMERGENCY DEPT 
[address] [address] [id number] 
[phone] Work/School Note Date: 3/20/2019 To Whom It May concern: 
 
Nataliia Ferrer was seen and treated today in the emergency room by the following provider(s): 
Attending Provider: Jero Breaux MD.   
 
Nataliia Ferrer may return to work on 3/22/2019. Sincerely, Danilo Rogers MD

## 2019-03-21 NOTE — ED NOTES
Patient (s) given copy of dc instructions and 1 written script(s)/ 0 electronic script(s). Patient(s)  verbalized understanding of instructions and script (s). Patient given a current medication reconciliation form and verbalized understanding of their medications. Patient (s) verbalized understanding of the importance of discussing medications with  his or her physician or clinic when they follow up. Patient alert and oriented and in no acute distress. Pt verbalizes pain scale of 9 out of 10 that started yesterday in the right upper lip and face. Pt declined wheelchair at discharge. Patient discharged home ambulatory with family member.

## 2019-03-22 ENCOUNTER — HOSPITAL ENCOUNTER (EMERGENCY)
Age: 34
Discharge: HOME | End: 2019-03-22
Attending: EMERGENCY MEDICINE
Payer: MEDICAID

## 2019-03-22 VITALS
OXYGEN SATURATION: 100 % | HEART RATE: 92 BPM | TEMPERATURE: 98.1 F | SYSTOLIC BLOOD PRESSURE: 136 MMHG | DIASTOLIC BLOOD PRESSURE: 79 MMHG | WEIGHT: 182.5 LBS | HEIGHT: 68 IN | BODY MASS INDEX: 27.66 KG/M2 | RESPIRATION RATE: 18 BRPM

## 2019-03-22 DIAGNOSIS — J20.9 ACUTE BRONCHITIS, UNSPECIFIED ORGANISM: Primary | ICD-10-CM

## 2019-03-22 DIAGNOSIS — R05.8 COUGH PRODUCTIVE OF PURULENT SPUTUM: ICD-10-CM

## 2019-03-22 PROCEDURE — 99282 EMERGENCY DEPT VISIT SF MDM: CPT

## 2019-03-22 RX ORDER — AMOXICILLIN 875 MG/1
875 TABLET, FILM COATED ORAL 2 TIMES DAILY
Qty: 20 TAB | Refills: 0 | Status: SHIPPED | OUTPATIENT
Start: 2019-03-22 | End: 2019-04-01

## 2019-03-22 RX ORDER — GUAIFENESIN 400 MG/1
400 TABLET ORAL EVERY 4 HOURS
Qty: 20 TAB | Refills: 0 | Status: SHIPPED | OUTPATIENT
Start: 2019-03-22 | End: 2019-07-23

## 2019-03-22 RX ORDER — PREDNISONE 5 MG/1
TABLET ORAL
Qty: 21 TAB | Refills: 0 | Status: SHIPPED | OUTPATIENT
Start: 2019-03-22 | End: 2019-07-23

## 2019-03-22 RX ORDER — BENZONATATE 100 MG/1
100 CAPSULE ORAL
Qty: 30 CAP | Refills: 0 | Status: SHIPPED | OUTPATIENT
Start: 2019-03-22 | End: 2019-03-29

## 2019-03-23 NOTE — ED NOTES
Pt presents ambulatory to ED complaining of nasal congestion and dry cough x5 days. Pt denies taking anything for her symptoms. Pt is alert and oriented x 4, RR even and unlabored, skin is warm and dry. Assesment completed and pt updated on plan of care. Emergency Department Nursing Plan of Care The Nursing Plan of Care is developed from the Nursing assessment and Emergency Department Attending provider initial evaluation. The plan of care may be reviewed in the ED Provider note. The Plan of Care was developed with the following considerations:  
Patient / Family readiness to learn indicated by:verbalized understanding Persons(s) to be included in education: patient Barriers to Learning/Limitations:No 
 
Signed Maegan Gasca RN   
3/22/2019   8:40 PM

## 2019-03-23 NOTE — ED PROVIDER NOTES
EMERGENCY DEPARTMENT HISTORY AND PHYSICAL EXAM 
 
Date: 3/22/2019 Patient Name: Juany Ulloa History of Presenting Illness Chief Complaint Patient presents with  Cold Symptoms History Provided By: Patient Chief Complaint: Cough Duration: 1 Weeks Timing:  Acute Quality: loose productive purulent sputum Severity: Moderate Modifying Factors: none Associated Symptoms: sore throat runny nose wheezing HPI: uJany Ulloa is a 29 y.o. female with a PMH of asthma who presents with cough runny nose sore throat for 1 week. Reports wheezing states she has out of her inhaler. Denies fever chills sick contacts. Denies chest pain shortness of breath abdominal pain nausea vomiting diarrhea. No other complaints at this time. PCP: Ernesto Jean MD 
 
Current Outpatient Medications Medication Sig Dispense Refill  benzonatate (TESSALON PERLES) 100 mg capsule Take 1 Cap by mouth three (3) times daily as needed for Cough for up to 7 days. 30 Cap 0  
 guaiFENesin (ORGANIDIN) 400 mg tablet Take 1 Tab by mouth every four (4) hours. 20 Tab 0  
 amoxicillin (AMOXIL) 875 mg tablet Take 1 Tab by mouth two (2) times a day for 10 days. 20 Tab 0  predniSONE (STERAPRED) 5 mg dose pack See administration instruction per 5mg dose pack 21 Tab 0  
 clindamycin (CLEOCIN) 150 mg capsule Take 2 Caps by mouth three (3) times daily for 5 days. 30 Cap 0  
 albuterol (PROVENTIL HFA, VENTOLIN HFA, PROAIR HFA) 90 mcg/actuation inhaler Take 2 Puffs by inhalation every four (4) hours as needed for Wheezing. 1 Inhaler 1  
 albuterol (PROVENTIL VENTOLIN) 2.5 mg /3 mL (0.083 %) nebulizer solution 3 mL by Nebulization route every four (4) hours as needed for Wheezing. 24 Each 0 Past History Past Medical History: 
Past Medical History:  
Diagnosis Date  Asthma  Delivery normal   
 Depression  Ectopic pregnancy  Miscarriage  Neurological disorder   
 migraines  Other ill-defined conditions(799.89)   
 ectopic pregnancy  Psychiatric disorder   
 anger Past Surgical History: 
Past Surgical History:  
Procedure Laterality Date [address] UNLISTED  HX CHOLECYSTECTOMY  HX GYN    
 right oophetrecomy  HX HERNIA REPAIR  14 Open repair right inguinal hernia with mesh  HX ORTHOPAEDIC Right arm/leg Family History: 
Family History Problem Relation Age of Onset  Cancer Mother   
     breast  
 Hypertension Mother  Thyroid Disease Mother  Sickle Cell Trait Father  Asthma Sister  Asthma Brother  Asthma Sister  Asthma Sister  Asthma Sister  Asthma Sister  Asthma Sister  Asthma Brother  Asthma Brother  Asthma Brother  Asthma Brother  Sickle Cell Anemia Brother  Asthma Brother  Diabetes Paternal Grandmother Social History: 
Social History Tobacco Use  Smoking status: Current Some Day Smoker Packs/day: 0.25 Types: Cigarettes Last attempt to quit: 2016 Years since quittin.9  Smokeless tobacco: Never Used  Tobacco comment: social smoker Substance Use Topics  Alcohol use: Yes Comment: occasionally  Drug use: No  
 
 
Allergies: Allergies Allergen Reactions  Ibuprofen Hives and Itching Review of Systems Review of Systems Constitutional: Negative for fatigue and fever. HENT: Positive for congestion, rhinorrhea and sore throat. Respiratory: Positive for cough and wheezing. Negative for shortness of breath. Cardiovascular: Negative for chest pain and palpitations. Gastrointestinal: Negative for abdominal pain. Musculoskeletal: Negative for arthralgias, myalgias, neck pain and neck stiffness. Skin: Negative for pallor and rash. Neurological: Negative for dizziness, tremors, weakness and headaches. Hematological: Negative for adenopathy. All other systems reviewed and are negative. Physical Exam  
 
Vitals:  
 03/22/19 2000 BP: 136/79 Pulse: 92 Resp: 18 Temp: 98.1 °F (36.7 °C) SpO2: 100% Weight: 82.8 kg (182 lb 8 oz) Height: 5' 8\" (1.727 m) Physical Exam  
Constitutional: She is oriented to person, place, and time. She appears well-developed and well-nourished. No distress. HENT:  
Head: Normocephalic and atraumatic. Right Ear: External ear normal.  
Left Ear: External ear normal.  
Nose: Nose normal.  
Mouth/Throat: Oropharynx is clear and moist.  
Erythema nasal mucosa Posterior oropharynx erythema Eyes: Conjunctivae are normal.  
Neck: Normal range of motion. Neck supple. Cardiovascular: Normal rate, regular rhythm and normal heart sounds. Pulmonary/Chest: Effort normal and breath sounds normal. No respiratory distress. She has no wheezes. Loose congested cougjh Abdominal: Soft. Bowel sounds are normal. There is no tenderness. Musculoskeletal: Normal range of motion. Lymphadenopathy:  
  She has no cervical adenopathy. Neurological: She is alert and oriented to person, place, and time. No cranial nerve deficit. Coordination normal.  
Skin: Skin is warm and dry. No rash noted. Psychiatric: She has a normal mood and affect. Her behavior is normal. Judgment and thought content normal.  
Nursing note and vitals reviewed. Diagnostic Study Results Labs - No results found for this or any previous visit (from the past 12 hour(s)). Radiologic Studies - No orders to display CT Results  (Last 48 hours) None CXR Results  (Last 48 hours) None Medical Decision Making I am the first provider for this patient. I reviewed the vital signs, available nursing notes, past medical history, past surgical history, family history and social history. Vital Signs-Reviewed the patient's vital signs. Records Reviewed: Nursing Notes and Old Medical Records Disposition: 
Home DISCHARGE NOTE:  
 
 
 
  Care plan outlined and precautions discussed. Patient has no new complaints, changes, or physical findings. All medications were reviewed with the patient; will d/c home with amoxicillin albuterol HFA tessalon perrles. Prednisone., All of pt's questions and concerns were addressed. Patient was instructed and agrees to follow up with PCP, as well as to return to the ED upon further deterioration. Patient is ready to go home. Follow-up Information Follow up With Specialties Details Why Contact Info Tony Ruelas MD Family Practice In 2 days  [address] [place] [address] Shania Brown [address] 
[phone] Discharge Medication List as of 3/22/2019  8:24 PM  
  
START taking these medications Details  
benzonatate (TESSALON PERLES) 100 mg capsule Take 1 Cap by mouth three (3) times daily as needed for Cough for up to 7 days. , Normal, Disp-30 Cap, R-0  
  
guaiFENesin (ORGANIDIN) 400 mg tablet Take 1 Tab by mouth every four (4) hours. , Normal, Disp-20 Tab, R-0  
  
amoxicillin (AMOXIL) 875 mg tablet Take 1 Tab by mouth two (2) times a day for 10 days. , Normal, Disp-20 Tab, R-0  
  
predniSONE (STERAPRED) 5 mg dose pack See administration instruction per 5mg dose pack, Normal, Disp-21 Tab, R-0  
  
  
CONTINUE these medications which have NOT CHANGED Details  
clindamycin (CLEOCIN) 150 mg capsule Take 2 Caps by mouth three (3) times daily for 5 days. , Print, Disp-30 Cap, R-0  
  
albuterol (PROVENTIL HFA, VENTOLIN HFA, PROAIR HFA) 90 mcg/actuation inhaler Take 2 Puffs by inhalation every four (4) hours as needed for Wheezing., Print, Disp-1 Inhaler, R-1  
  
albuterol (PROVENTIL VENTOLIN) 2.5 mg /3 mL (0.083 %) nebulizer solution 3 mL by Nebulization route every four (4) hours as needed for Wheezing., Print, Disp-24 Each, R-0 Provider Notes (Medical Decision Making): DDX bronchitis pneumonia influenza upper respiratory infection  
procedures: Procedures Diagnosis Clinical Impression: 1. Acute bronchitis, unspecified organism 2. Cough productive of purulent sputum

## 2019-04-15 ENCOUNTER — HOSPITAL ENCOUNTER (EMERGENCY)
Age: 34
Discharge: HOME | End: 2019-04-15
Attending: EMERGENCY MEDICINE
Payer: MEDICAID

## 2019-04-15 VITALS
SYSTOLIC BLOOD PRESSURE: 134 MMHG | HEIGHT: 68 IN | HEART RATE: 96 BPM | RESPIRATION RATE: 20 BRPM | BODY MASS INDEX: 27.74 KG/M2 | WEIGHT: 183 LBS | DIASTOLIC BLOOD PRESSURE: 77 MMHG | OXYGEN SATURATION: 100 % | TEMPERATURE: 98.4 F

## 2019-04-15 DIAGNOSIS — N76.0 BV (BACTERIAL VAGINOSIS): Primary | ICD-10-CM

## 2019-04-15 DIAGNOSIS — B96.89 BV (BACTERIAL VAGINOSIS): Primary | ICD-10-CM

## 2019-04-15 LAB
APPEARANCE UR: CLEAR
BACTERIA URNS QL MICRO: NEGATIVE /HPF
BILIRUB UR QL: NEGATIVE
CLUE CELLS VAG QL WET PREP: NORMAL
COLOR UR: NORMAL
EPITH CASTS URNS QL MICRO: NORMAL /LPF
GLUCOSE UR STRIP.AUTO-MCNC: NEGATIVE MG/DL
HCG UR QL: NEGATIVE
HGB UR QL STRIP: NEGATIVE
KETONES UR QL STRIP.AUTO: NEGATIVE MG/DL
KOH PREP SPEC: NORMAL
LEUKOCYTE ESTERASE UR QL STRIP.AUTO: NEGATIVE
NITRITE UR QL STRIP.AUTO: NEGATIVE
PH UR STRIP: 6 [PH] (ref 5–8)
PROT UR STRIP-MCNC: NEGATIVE MG/DL
RBC #/AREA URNS HPF: NORMAL /HPF (ref 0–5)
SERVICE CMNT-IMP: NORMAL
SP GR UR REFRACTOMETRY: 1.02 (ref 1–1.03)
T VAGINALIS VAG QL WET PREP: NORMAL
UA: UC IF INDICATED,UAUC: NORMAL
UROBILINOGEN UR QL STRIP.AUTO: 1 EU/DL (ref 0.2–1)
WBC URNS QL MICRO: NORMAL /HPF (ref 0–4)

## 2019-04-15 PROCEDURE — 96372 THER/PROPH/DIAG INJ SC/IM: CPT

## 2019-04-15 PROCEDURE — 99283 EMERGENCY DEPT VISIT LOW MDM: CPT

## 2019-04-15 PROCEDURE — 87210 SMEAR WET MOUNT SALINE/INK: CPT

## 2019-04-15 PROCEDURE — 74011250636 HC RX REV CODE- 250/636: Performed by: PHYSICIAN ASSISTANT

## 2019-04-15 PROCEDURE — 81025 URINE PREGNANCY TEST: CPT

## 2019-04-15 PROCEDURE — 81001 URINALYSIS AUTO W/SCOPE: CPT

## 2019-04-15 PROCEDURE — 74011250637 HC RX REV CODE- 250/637: Performed by: PHYSICIAN ASSISTANT

## 2019-04-15 PROCEDURE — 87491 CHLMYD TRACH DNA AMP PROBE: CPT

## 2019-04-15 RX ORDER — METRONIDAZOLE 500 MG/1
500 TABLET ORAL 2 TIMES DAILY
Qty: 14 TAB | Refills: 0 | Status: SHIPPED | OUTPATIENT
Start: 2019-04-15 | End: 2019-04-22

## 2019-04-15 RX ORDER — AZITHROMYCIN 500 MG/1
1000 TABLET, FILM COATED ORAL
Status: COMPLETED | OUTPATIENT
Start: 2019-04-15 | End: 2019-04-15

## 2019-04-15 RX ADMIN — AZITHROMYCIN 1000 MG: 500 TABLET, FILM COATED ORAL at 22:49

## 2019-04-15 RX ADMIN — LIDOCAINE HYDROCHLORIDE 250 MG: 10 INJECTION, SOLUTION EPIDURAL; INFILTRATION; INTRACAUDAL; PERINEURAL at 22:49

## 2019-04-16 NOTE — DISCHARGE INSTRUCTIONS
Patient Education        Bacterial Vaginosis: Care Instructions  Your Care Instructions    Bacterial vaginosis is a type of vaginal infection. It is caused by excess growth of certain bacteria that are normally found in the vagina. Symptoms can include itching, swelling, pain when you urinate or have sex, and a gray or yellow discharge with a \"fishy\" odor. It is not considered an infection that is spread through sexual contact. Although symptoms can be annoying and uncomfortable, bacterial vaginosis does not usually cause other health problems. However, if you have it while you are pregnant, it can cause complications. While the infection may go away on its own, most doctors use antibiotics to treat it. You may have been prescribed pills or vaginal cream. With treatment, bacterial vaginosis usually clears up in 5 to 7 days. Follow-up care is a key part of your treatment and safety. Be sure to make and go to all appointments, and call your doctor if you are having problems. It's also a good idea to know your test results and keep a list of the medicines you take. How can you care for yourself at home? · Take your antibiotics as directed. Do not stop taking them just because you feel better. You need to take the full course of antibiotics. · Do not eat or drink anything that contains alcohol if you are taking metronidazole (Flagyl). · Keep using your medicine if you start your period. Use pads instead of tampons while using a vaginal cream or suppository. Tampons can absorb the medicine. · Wear loose cotton clothing. Do not wear nylon and other materials that hold body heat and moisture close to the skin. · Do not scratch. Relieve itching with a cold pack or a cool bath. · Do not wash your vaginal area more than once a day. Use plain water or a mild, unscented soap. Do not douche. When should you call for help?   Watch closely for changes in your health, and be sure to contact your doctor if:    · You have unexpected vaginal bleeding.     · You have a fever.     · You have new or increased pain in your vagina or pelvis.     · You are not getting better after 1 week.     · Your symptoms return after you finish the course of your medicine. Where can you learn more? Go to http://bethany-nia.info/. Kristine Marquez in the search box to learn more about \"Bacterial Vaginosis: Care Instructions. \"  Current as of: May 14, 2018  Content Version: 11.9  © 6824-7694 [employer]. Care instructions adapted under license by [employer] (which disclaims liability or warranty for this information). If you have questions about a medical condition or this instruction, always ask your healthcare professional. [address] any warranty or liability for your use of this information.

## 2019-04-16 NOTE — ED NOTES
Emergency Department Nursing Plan of Care The Nursing Plan of Care is developed from the Nursing assessment and Emergency Department Attending provider initial evaluation. The plan of care may be reviewed in the ED Provider note. The Plan of Care was developed with the following considerations:  
Patient / Family readiness to learn indicated by:verbalized understanding Persons(s) to be included in education: patient Barriers to Learning/Limitations:No 
 
Signed Trae Parks RN   
4/15/2019   10:44 PM

## 2019-04-16 NOTE — ED TRIAGE NOTES
Pt comes in with c/o urinary frequency, bladder pain, and vaginal itching x 2 weeks. Pt says she has been treated with multiple abx over the past months for URI and recurring BV. Pt denies discharge or odor.

## 2019-04-16 NOTE — ED PROVIDER NOTES
EMERGENCY DEPARTMENT HISTORY AND PHYSICAL EXAM 
 
 
Date: 4/15/2019 Patient Name: Karl Chi History of Presenting Illness Chief Complaint Patient presents with  Urinary Frequency  Vaginal Itching History Provided By: Patient HPI: Karl Chi, 29 y.o. female with PMHx significant for depression, asthma, ectopic pregnancy, migraines, presents ambulatory to the ED with cc of urinary frequency with associated vaginal itching x 2 weeks. Patient reports recent antibiotic which she thinks could be causing yeast infection. Patient has been taken anything for symptoms. Denies fever/chills, dysuria, hematuria. Denies history of diabetes. Reports intermittent suprapubic aching. Describes discomfort as 7 out of 10. Admits to unprotected intercourse and would like to be tested and treated for STDs. LMP: 4 weeks ago. Patient reports cycles are typically regular. There are no other complaints, changes, or physical findings at this time. PCP: John Miles MD 
 
No current facility-administered medications on file prior to encounter. Current Outpatient Medications on File Prior to Encounter Medication Sig Dispense Refill  guaiFENesin (ORGANIDIN) 400 mg tablet Take 1 Tab by mouth every four (4) hours. 20 Tab 0  predniSONE (STERAPRED) 5 mg dose pack See administration instruction per 5mg dose pack 21 Tab 0  
 albuterol (PROVENTIL HFA, VENTOLIN HFA, PROAIR HFA) 90 mcg/actuation inhaler Take 2 Puffs by inhalation every four (4) hours as needed for Wheezing. 1 Inhaler 1  
 albuterol (PROVENTIL VENTOLIN) 2.5 mg /3 mL (0.083 %) nebulizer solution 3 mL by Nebulization route every four (4) hours as needed for Wheezing. 24 Each 0 Past History Past Medical History: 
Past Medical History:  
Diagnosis Date  Asthma  Delivery normal   
 Depression  Ectopic pregnancy  Miscarriage  Neurological disorder   
 migraines  Other ill-defined conditions(799.89)   
 ectopic pregnancy  Psychiatric disorder   
 anger Past Surgical History: 
Past Surgical History:  
Procedure Laterality Date [address] UNLISTED  HX CHOLECYSTECTOMY  HX GYN    
 right oophetrecomy  HX HERNIA REPAIR  12/5/14 Open repair right inguinal hernia with mesh  HX ORTHOPAEDIC Right arm/leg Family History: 
Family History Problem Relation Age of Onset  Cancer Mother   
     breast  
 Hypertension Mother  Thyroid Disease Mother  Sickle Cell Trait Father  Asthma Sister  Asthma Brother  Asthma Sister  Asthma Sister  Asthma Sister  Asthma Sister  Asthma Sister  Asthma Brother  Asthma Brother  Asthma Brother  Asthma Brother  Sickle Cell Anemia Brother  Asthma Brother  Diabetes Paternal Grandmother Social History: 
Social History Tobacco Use  Smoking status: Former Smoker Packs/day: 0.00 Types: Cigarettes Last attempt to quit: 04/2016 Years since quitting: 3.0  Smokeless tobacco: Never Used  Tobacco comment: social smoker Substance Use Topics  Alcohol use: Yes Comment: occasionally  Drug use: No  
 
 
Allergies: Allergies Allergen Reactions  Ibuprofen Hives and Itching Review of Systems Review of Systems Constitutional: Negative for chills and fever. Respiratory: Negative for shortness of breath. Cardiovascular: Negative for chest pain. Gastrointestinal: Positive for abdominal pain. Negative for nausea and vomiting. Genitourinary: Positive for frequency and vaginal discharge ( And itching). Negative for decreased urine volume, dysuria, flank pain, vaginal bleeding and vaginal pain. Musculoskeletal: Negative for back pain and myalgias. Skin: Negative for color change, pallor, rash and wound. Neurological: Negative for dizziness, weakness and light-headedness. All other systems reviewed and are negative. Physical Exam  
Physical Exam  
Constitutional: She is oriented to person, place, and time. She appears well-developed and well-nourished. No distress. HENT:  
Head: Normocephalic and atraumatic. Eyes: Conjunctivae are normal.  
Cardiovascular: Normal rate, regular rhythm and normal heart sounds. Pulmonary/Chest: Effort normal and breath sounds normal. No respiratory distress. Abdominal: Soft. Bowel sounds are normal. She exhibits no distension. No abdominal tenderness on exam 
Abdomen soft Musculoskeletal: Normal range of motion. Neurological: She is alert and oriented to person, place, and time. Skin: Skin is warm. No rash noted. Psychiatric: She has a normal mood and affect. Her behavior is normal.  
Nursing note and vitals reviewed. Diagnostic Study Results Labs - Recent Results (from the past 12 hour(s)) URINALYSIS W/ REFLEX CULTURE Collection Time: 04/15/19 10:19 PM  
Result Value Ref Range Color YELLOW/STRAW Appearance CLEAR CLEAR Specific gravity 1.025 1.003 - 1.030    
 pH (UA) 6.0 5.0 - 8.0 Protein NEGATIVE  NEG mg/dL Glucose NEGATIVE  NEG mg/dL Ketone NEGATIVE  NEG mg/dL Bilirubin NEGATIVE  NEG Blood NEGATIVE  NEG Urobilinogen 1.0 0.2 - 1.0 EU/dL Nitrites NEGATIVE  NEG Leukocyte Esterase NEGATIVE  NEG    
 WBC 0-4 0 - 4 /hpf  
 RBC 0-5 0 - 5 /hpf Epithelial cells FEW FEW /lpf Bacteria NEGATIVE  NEG /hpf  
 UA:UC IF INDICATED CULTURE NOT INDICATED BY UA RESULT CNI    
WET PREP Collection Time: 04/15/19 10:19 PM  
Result Value Ref Range Clue cells CLUE CELLS PRESENT Wet prep NO TRICHOMONAS SEEN    
KOH, OTHER SOURCES Collection Time: 04/15/19 10:19 PM  
Result Value Ref Range Special Requests: NO SPECIAL REQUESTS    
 KOH NO YEAST SEEN    
HCG URINE, QL. - POC Collection Time: 04/15/19 10:24 PM  
Result Value Ref Range Pregnancy test,urine (POC) NEGATIVE  NEG Radiologic Studies - No orders to display CT Results  (Last 48 hours) None CXR Results  (Last 48 hours) None Medical Decision Making I am the first provider for this patient. I reviewed the vital signs, available nursing notes, past medical history, past surgical history, family history and social history. Vital Signs-Reviewed the patient's vital signs. Patient Vitals for the past 12 hrs: 
 Temp Pulse Resp BP SpO2  
04/15/19 2148 98.4 °F (36.9 °C) 96 20 134/77 100 % Records Reviewed: Nursing Notes and Old Medical Records Provider Notes (Medical Decision Making): DDx: Gonorrhea, Chlamydia, Trich, UTI, BV, Yeast 
 
 
ED Course:  
Initial assessment performed. The patients presenting problems have been discussed, and they are in agreement with the care plan formulated and outlined with them. I have encouraged them to ask questions as they arise throughout their visit. Disposition: 
10:58 PM 
Discussed lab results with pt along with dx and treatment plan. Discussed importance of PCP follow up. All questions answered. Pt voiced they understood. Return if sx worsen. PLAN: 
1. Current Discharge Medication List  
  
START taking these medications Details  
metroNIDAZOLE (FLAGYL) 500 mg tablet Take 1 Tab by mouth two (2) times a day for 7 days. Qty: 14 Tab, Refills: 0  
  
  
 
2. Follow-up Information Follow up With Specialties Details Why Contact Info Juan M Rodriguez MD Family Practice Schedule an appointment as soon as possible for a visit As needed [address] 
[phone] Return to ED if worse Diagnosis Clinical Impression: 1. BV (bacterial vaginosis)

## 2019-05-01 ENCOUNTER — APPOINTMENT (OUTPATIENT)
Dept: GENERAL RADIOLOGY | Age: 34
End: 2019-05-01
Attending: EMERGENCY MEDICINE
Payer: MEDICAID

## 2019-05-01 ENCOUNTER — HOSPITAL ENCOUNTER (EMERGENCY)
Age: 34
Discharge: HOME | End: 2019-05-01
Attending: EMERGENCY MEDICINE
Payer: MEDICAID

## 2019-05-01 VITALS
HEART RATE: 80 BPM | BODY MASS INDEX: 27.74 KG/M2 | WEIGHT: 183 LBS | SYSTOLIC BLOOD PRESSURE: 133 MMHG | RESPIRATION RATE: 18 BRPM | DIASTOLIC BLOOD PRESSURE: 82 MMHG | TEMPERATURE: 98.2 F | OXYGEN SATURATION: 100 % | HEIGHT: 68 IN

## 2019-05-01 DIAGNOSIS — S67.10XA CRUSHED FINGER, DISTAL, INITIAL ENCOUNTER: Primary | ICD-10-CM

## 2019-05-01 PROCEDURE — 73140 X-RAY EXAM OF FINGER(S): CPT

## 2019-05-01 PROCEDURE — 99283 EMERGENCY DEPT VISIT LOW MDM: CPT

## 2019-05-02 NOTE — ED NOTES
Patient  given copy of dc instructions and 0 script(s). Patient (s)  verbalized understanding of instructions and script (s). Patient given a current medication reconciliation form and verbalized understanding of their medications. Patient verbalized understanding of the importance of discussing medications with  his or her physician or clinic they will be following up with. Patient alert and oriented and in no acute distress. Patient discharged home ambulatory.

## 2019-05-02 NOTE — ED NOTES
Pt arrived to ED  with c/o pain in pointer finger x 4 hours following injury in door. Pt used tylenol 4 hours ago with no alleviation. Finger is swollen with positive radial pulses and positive neuro sensory. . Pt is in no acute distress. Will continue to monitor. See nursing assessment. Safety precautions in place; call light within reach. Emergency Department Nursing Plan of Care The Nursing Plan of Care is developed from the Nursing assessment and Emergency Department Attending provider initial evaluation. The plan of care may be reviewed in the ED Provider note. The Plan of Care was developed with the following considerations:  
Patient / Family readiness to learn indicated by:verbalized understanding Persons(s) to be included in education: patient Barriers to Learning/Limitations:No 
 
Signed Kika Garcia RN   
5/1/2019   10:55 PM

## 2019-05-02 NOTE — ED PROVIDER NOTES
Slammed right index finger in car door 4 hours ago. Presents with pain and mild swelling of right fingertip and DIP joint. Decreased range of motion secondary to pain. Tried Tylenol at home with minimal relief. Finger Pain The problem occurs constantly. The problem has been rapidly worsening. The pain is present in the right fingers. The quality of the pain is described as pounding. The pain is moderate. Associated symptoms include limited range of motion. Pertinent negatives include no numbness, no stiffness, no itching, no back pain and no neck pain. She has tried OTC pain medications for the symptoms. The treatment provided no relief. Past Medical History:  
Diagnosis Date  Asthma  Delivery normal   
 Depression  Ectopic pregnancy  Miscarriage  Neurological disorder   
 migraines  Other ill-defined conditions(799.89)   
 ectopic pregnancy  Psychiatric disorder   
 anger Past Surgical History:  
Procedure Laterality Date [address] UN[address]ED  HX CHOLECYSTECTOMY  HX GYN    
 right oophetrecomy  HX HERNIA REPAIR  12/5/14 Open repair right inguinal hernia with mesh  HX ORTHOPAEDIC Right arm/leg Family History:  
Problem Relation Age of Onset  Cancer Mother   
     breast  
 Hypertension Mother  Thyroid Disease Mother  Sickle Cell Trait Father  Asthma Sister  Asthma Brother  Asthma Sister  Asthma Sister  Asthma Sister  Asthma Sister  Asthma Sister  Asthma Brother  Asthma Brother  Asthma Brother  Asthma Brother  Sickle Cell Anemia Brother  Asthma Brother  Diabetes Paternal Grandmother Social History Socioeconomic History  Marital status:  Spouse name: Not on file  Number of children: Not on file  Years of education: Not on file  Highest education level: Not on file Occupational History  Not on file Social Needs  Financial resource strain: Not on file  Food insecurity:  
  Worry: Not on file Inability: Not on file  Transportation needs:  
  Medical: Not on file Non-medical: Not on file Tobacco Use  Smoking status: Former Smoker Packs/day: 0.00 Types: Cigarettes Last attempt to quit: 04/2016 Years since quitting: 3.0  Smokeless tobacco: Never Used  Tobacco comment: social smoker Substance and Sexual Activity  Alcohol use: Yes Comment: occasionally  Drug use: No  
 Sexual activity: Never Partners: Male Birth control/protection: None Lifestyle  Physical activity:  
  Days per week: Not on file Minutes per session: Not on file  Stress: Not on file Relationships  Social connections:  
  Talks on phone: Not on file Gets together: Not on file Attends [employer] service: Not on file Active member of club or organization: Not on file Attends meetings of clubs or organizations: Not on file Relationship status: Not on file  Intimate partner violence:  
  Fear of current or ex partner: Not on file Emotionally abused: Not on file Physically abused: Not on file Forced sexual activity: Not on file Other Topics Concern  Not on file Social History Narrative  Not on file ALLERGIES: Ibuprofen Review of Systems Constitutional: Negative. Negative for fever. HENT: Negative. Negative for drooling, facial swelling and trouble swallowing. Eyes: Negative. Negative for discharge and redness. Respiratory: Negative. Negative for chest tightness, shortness of breath and wheezing. Cardiovascular: Negative. Negative for chest pain. Gastrointestinal: Negative. Negative for abdominal distention, abdominal pain, constipation, diarrhea, nausea and vomiting. Endocrine: Negative. Genitourinary: Negative. Negative for difficulty urinating and dysuria. Musculoskeletal: Positive for joint swelling and myalgias. Negative for arthralgias, back pain, neck pain and stiffness. Skin: Negative. Negative for color change, itching and rash. Allergic/Immunologic: Negative. Neurological: Negative. Negative for syncope, facial asymmetry, speech difficulty and numbness. Hematological: Negative. Psychiatric/Behavioral: Negative. Negative for agitation and confusion. All other systems reviewed and are negative. Vitals:  
 05/01/19 2233 BP: 133/82 Pulse: 80 Resp: 18 Temp: 98.2 °F (36.8 °C) SpO2: 100% Weight: 83 kg (183 lb) Height: 5' 8\" (1.727 m) Physical Exam  
Constitutional: She is oriented to person, place, and time. She appears well-developed and well-nourished. HENT:  
Head: Normocephalic and atraumatic. Eyes: Conjunctivae and EOM are normal.  
Neck: Neck supple. Cardiovascular: Normal rate, regular rhythm and intact distal pulses. Pulmonary/Chest: Effort normal. No respiratory distress. Abdominal: Soft. There is no tenderness. Musculoskeletal: She exhibits no deformity. Mild erythema and mild swelling of the right index finger distal tuft and DIP joint. No gross deformity. No subungual hematoma. Neurological: She is alert and oriented to person, place, and time. Skin: Skin is warm and dry. Psychiatric: She has a normal mood and affect. Her behavior is normal. Thought content normal.  
Vitals reviewed. MDM Number of Diagnoses or Management Options Diagnosis management comments: Contusion, Fracture, subluxation, sprain, strain Procedures LABORATORY TESTS: 
No results found for this or any previous visit (from the past 12 hour(s)). IMAGING RESULTS: 
XR 2ND FINGER RT MIN 2 V Final Result IMPRESSION:   
1. No visible fracture. MEDICATIONS GIVEN: 
Medications - No data to display IMPRESSION: 
1. Crushed finger, distal, initial encounter PLAN: 
1. Discharge Medication List as of 5/1/2019 11:08 PM  
  
 
2. Follow-up Information Follow up With Specialties Details Why Contact Info Oswald Myers MD Family Practice Schedule an appointment as soon as possible for a visit  [facility], [facility] [address] 
[phone] Return to ED if worse

## 2019-05-02 NOTE — DISCHARGE INSTRUCTIONS
Patient Education        Finger Bruises: Care Instructions  Your Care Instructions    Bruises occur when small blood vessels under your skin tear or rupture, most often from a twist, bump, or fall. Blood leaks into tissues under the skin and causes a black-and-blue color that may become purplish black, reddish blue, or yellowish green as the bruise heals. Rest and home treatment can help you heal.  Your doctor may have taped the bruised finger to the one next to it or put a splint on the finger to keep it in position while it heals. The doctor may recommend exercises to strengthen your finger. If you damaged bones or muscles, you may need more treatment. Most bruises aren't serious and will go away on their own within 2 to 4 weeks. Follow-up care is a key part of your treatment and safety. Be sure to make and go to all appointments, and call your doctor if you are having problems. It's also a good idea to know your test results and keep a list of the medicines you take. How can you care for yourself at home? · Put ice or a cold pack on the finger for 10 to 20 minutes at a time. Put a thin cloth between the ice and your skin. · Prop up your hand on a pillow when you ice your finger or anytime you sit or lie down during the next 3 days. Try to keep your hand above the level of your heart. This will help reduce swelling. · If your doctor put a splint on your finger, wear the splint exactly as directed. Make sure the splint is not so tight that your finger gets numb or tingles. You can loosen the splint if it's too tight. · If you have your fingers taped together, make sure the tape is snug but not so tight that your fingers get numb or tingle. You can loosen the tape if it's too tight. If you need to retape your fingers, always put padding between the fingers before putting on the new tape. Limit use of your finger to motions or activities that don't cause pain.   · Take pain medicines exactly as directed. ? If the doctor gave you a prescription medicine for pain, take it as prescribed. ? If you are not taking a prescription pain medicine, ask your doctor if you can take an over-the-counter medicine. · Be sure to follow your doctor's advice about moving and exercising your injured finger. When should you call for help? Call your doctor now or seek immediate medical care if:    · You have symptoms of infection, such as:  ? Increased pain, swelling, warmth, or redness. ? Red streaks leading from the area. ? Pus draining from the area. ? A fever.     · Your finger is cool or pale or changes color.    Watch closely for changes in your health, and be sure to contact your doctor if:    · You have new or worse pain.     · Your finger does not get better as expected. Where can you learn more? Go to http://bethany-nia.info/. Enter D134 in the search box to learn more about \"Finger Bruises: Care Instructions. \"  Current as of: September 23, 2018  Content Version: 11.9  © 9681-9174 [employer]. Care instructions adapted under license by [employer] (which disclaims liability or warranty for this information). If you have questions about a medical condition or this instruction, always ask your healthcare professional. [address] any warranty or liability for your use of this information.

## 2019-07-23 ENCOUNTER — HOSPITAL ENCOUNTER (EMERGENCY)
Age: 34
Discharge: HOME | End: 2019-07-23
Attending: EMERGENCY MEDICINE
Payer: MEDICAID

## 2019-07-23 VITALS
BODY MASS INDEX: 26.22 KG/M2 | DIASTOLIC BLOOD PRESSURE: 73 MMHG | HEART RATE: 75 BPM | TEMPERATURE: 98.1 F | SYSTOLIC BLOOD PRESSURE: 130 MMHG | WEIGHT: 173 LBS | HEIGHT: 68 IN | OXYGEN SATURATION: 99 % | RESPIRATION RATE: 18 BRPM

## 2019-07-23 DIAGNOSIS — G43.909 MIGRAINE WITHOUT STATUS MIGRAINOSUS, NOT INTRACTABLE, UNSPECIFIED MIGRAINE TYPE: Primary | ICD-10-CM

## 2019-07-23 LAB — HCG UR QL: NEGATIVE

## 2019-07-23 PROCEDURE — 74011250637 HC RX REV CODE- 250/637: Performed by: NURSE PRACTITIONER

## 2019-07-23 PROCEDURE — 81025 URINE PREGNANCY TEST: CPT

## 2019-07-23 PROCEDURE — 99283 EMERGENCY DEPT VISIT LOW MDM: CPT

## 2019-07-23 RX ORDER — BUTALBITAL, ACETAMINOPHEN AND CAFFEINE 50; 325; 40 MG/1; MG/1; MG/1
1 TABLET ORAL
Status: COMPLETED | OUTPATIENT
Start: 2019-07-23 | End: 2019-07-23

## 2019-07-23 RX ORDER — LORATADINE 10 MG/1
10 TABLET ORAL DAILY
Qty: 30 TAB | Refills: 0 | Status: SHIPPED | OUTPATIENT
Start: 2019-07-23 | End: 2020-04-17

## 2019-07-23 RX ORDER — BUTALBITAL, ACETAMINOPHEN AND CAFFEINE 300; 40; 50 MG/1; MG/1; MG/1
1 CAPSULE ORAL
Qty: 12 CAP | Refills: 0 | Status: SHIPPED | OUTPATIENT
Start: 2019-07-23 | End: 2020-04-17

## 2019-07-23 RX ADMIN — BUTALBITAL, ACETAMINOPHEN, AND CAFFEINE 1 TABLET: 50; 325; 40 TABLET, COATED ORAL at 20:25

## 2019-07-23 NOTE — LETTER
[facility] EMERGENCY DEPT 
[address] [address] 
[phone] Work/School Note Date: 7/23/2019 To Whom It May concern: 
 
Daisy Mariscal was seen and treated today in the emergency room by the following provider(s): 
Attending Provider: Lele Major MD 
Nurse Practitioner: Ted Damico NP. Daisy Mariscal may return to work on 7/25/19. Sincerely, Alfredito Ji NP

## 2019-07-23 NOTE — ED PROVIDER NOTES
EMERGENCY DEPARTMENT HISTORY AND PHYSICAL EXAM    Date: 7/23/2019  Patient Name: Shirley Bender    History of Presenting Illness     Chief Complaint   Patient presents with    Headache         History Provided By: Patient    HPI: Shirley Bender is a 29 y.o. female with a PMH of Asthma, migraines who presents with h/a. Onset 4 hours ago. Located i n L temporal region. Pt reports intermittent h/a x 2 weeks. States she ran out of Fioricet 2 weeks ago. Taking Excedrin with no relief. Denies n/v, vision changes, dizziness. States pain is worse today. She has neurology f/u the end of august     PCP: Beth Grossman MD    Current Outpatient Medications   Medication Sig Dispense Refill    loratadine (CLARITIN) 10 mg tablet Take 1 Tab by mouth daily. 30 Tab 0    butalbital-acetaminophen-caff (FIORICET) -40 mg per capsule Take 1 Cap by mouth every six (6) hours as needed for Pain. 12 Cap 0    albuterol (PROVENTIL HFA, VENTOLIN HFA, PROAIR HFA) 90 mcg/actuation inhaler Take 2 Puffs by inhalation every four (4) hours as needed for Wheezing. 1 Inhaler 1    albuterol (PROVENTIL VENTOLIN) 2.5 mg /3 mL (0.083 %) nebulizer solution 3 mL by Nebulization route every four (4) hours as needed for Wheezing.  24 Each 0       Past History     Past Medical History:  Past Medical History:   Diagnosis Date    Asthma     Delivery normal     Depression     Ectopic pregnancy     Miscarriage     Neurological disorder     migraines    Other ill-defined conditions(679.89)     ectopic pregnancy    Psychiatric disorder     anger        Past Surgical History:  Past Surgical History:   Procedure Laterality Date    ABDOMEN SURGERY PROC UNLISTED      HX CHOLECYSTECTOMY      HX GYN      right oophetrecomy    HX HERNIA REPAIR  12/5/14    Open repair right inguinal hernia with mesh    HX ORTHOPAEDIC      Right arm/leg       Family History:  Family History   Problem Relation Age of Onset    Cancer Mother         breast    Hypertension Mother     Thyroid Disease Mother     Sickle Cell Trait Father     Asthma Sister     Asthma Brother     Asthma Sister     Asthma Sister    Moshe Godfrey Asthma Sister     Asthma Sister     Asthma Sister     Asthma Brother     Asthma Brother     Asthma Brother     Asthma Brother     Sickle Cell Anemia Brother     Asthma Brother     Diabetes Paternal Grandmother        Social History:  Social History     Tobacco Use    Smoking status: Former Smoker     Packs/day: 0.00     Types: Cigarettes     Last attempt to quit: 04/2016     Years since quitting: 3.3    Smokeless tobacco: Never Used    Tobacco comment: social smoker   Substance Use Topics    Alcohol use: Yes     Comment: occasionally    Drug use: No       Allergies: Allergies   Allergen Reactions    Ibuprofen Hives and Itching         Review of Systems   Review of Systems   Constitutional: Negative for chills, fatigue and fever. HENT: Positive for congestion. Negative for ear discharge, ear pain, postnasal drip, rhinorrhea, sinus pain, sneezing and sore throat. Eyes: Negative for pain. Respiratory: Negative for cough. Cardiovascular: Negative for chest pain. Gastrointestinal: Negative for abdominal pain, nausea and vomiting. Musculoskeletal: Negative for arthralgias. Skin: Negative for rash. Neurological: Positive for headaches. Negative for dizziness, weakness and numbness. All other systems reviewed and are negative. Physical Exam     Vitals:    07/23/19 1926   BP: 130/73   Pulse: 75   Resp: 18   Temp: 98.1 °F (36.7 °C)   SpO2: 99%   Weight: 78.5 kg (173 lb)   Height: 5' 8\" (1.727 m)     Physical Exam   Constitutional: She is oriented to person, place, and time. She appears well-developed and well-nourished. No distress. HENT:   Head: Normocephalic and atraumatic.    Right Ear: Tympanic membrane and ear canal normal.   Left Ear: Tympanic membrane and ear canal normal.   Nose: Mucosal edema (bilateral ) and rhinorrhea present. Right sinus exhibits no maxillary sinus tenderness and no frontal sinus tenderness. Left sinus exhibits no maxillary sinus tenderness and no frontal sinus tenderness. Mouth/Throat: Uvula is midline, oropharynx is clear and moist and mucous membranes are normal.   Eyes: Pupils are equal, round, and reactive to light. Conjunctivae are normal.   Neck: Normal range of motion. Neck supple. Cardiovascular: Normal rate, regular rhythm and normal heart sounds. Pulmonary/Chest: Effort normal and breath sounds normal. No respiratory distress. She has no wheezes. Musculoskeletal: Normal range of motion. Neurological: She is alert and oriented to person, place, and time. She has normal strength and normal reflexes. No cranial nerve deficit or sensory deficit. She displays a negative Romberg sign. Coordination normal. GCS eye subscore is 4. GCS verbal subscore is 5. GCS motor subscore is 6. Skin: Skin is warm and dry. Psychiatric: She has a normal mood and affect. Nursing note and vitals reviewed. Diagnostic Study Results     Labs -     Recent Results (from the past 12 hour(s))   HCG URINE, QL. - POC    Collection Time: 07/23/19  8:23 PM   Result Value Ref Range    Pregnancy test,urine (POC) NEGATIVE  NEG         Radiologic Studies -   No orders to display     CT Results  (Last 48 hours)    None        CXR Results  (Last 48 hours)    None            Medical Decision Making   I am the first provider for this patient. I reviewed the vital signs, available nursing notes, past medical history, past surgical history, family history and social history. Vital Signs-Reviewed the patient's vital signs. Records Reviewed: Nursing Notes and Old Medical Records            Disposition:  Discharge     DISCHARGE NOTE:   7:43 PM      Care plan outlined and precautions discussed. Patient has no new complaints, changes, or physical findings.    All medications were reviewed with the patient; will d/c home with claritin and fioricet . All of pt's questions and concerns were addressed. Patient was instructed and agrees to follow up with PCP and neurology, as well as to return to the ED upon further deterioration. Patient is ready to go home. Follow-up Information     Follow up With Specialties Details Why Contact Info    Cris Shen MD [facility]  If symptoms worsen [address]  [phone]            Discharge Medication List as of 7/23/2019  8:29 PM      START taking these medications    Details   loratadine (CLARITIN) 10 mg tablet Take 1 Tab by mouth daily. , Normal, Disp-30 Tab, R-0      butalbital-acetaminophen-caff (FIORICET) -40 mg per capsule Take 1 Cap by mouth every six (6) hours as needed for Pain., Normal, Disp-12 Cap, R-0         CONTINUE these medications which have NOT CHANGED    Details   albuterol (PROVENTIL HFA, VENTOLIN HFA, PROAIR HFA) 90 mcg/actuation inhaler Take 2 Puffs by inhalation every four (4) hours as needed for Wheezing., Print, Disp-1 Inhaler, R-1      albuterol (PROVENTIL VENTOLIN) 2.5 mg /3 mL (0.083 %) nebulizer solution 3 mL by Nebulization route every four (4) hours as needed for Wheezing., Print, Disp-24 Each, R-0         STOP taking these medications       guaiFENesin (ORGANIDIN) 400 mg tablet Comments:   Reason for Stopping:         predniSONE (STERAPRED) 5 mg dose pack Comments:   Reason for Stopping:               Provider Notes (Medical Decision Making):     DDX: migraine, tension h/a, sinusitis, allergic rhinitis    Procedures:  Procedures    Please note that this dictation was completed with Dragon, computer voice recognition software. Quite often unanticipated grammatical, syntax, homophones, and other interpretive errors are inadvertently transcribed by the computer software. Please disregard these errors. Additionally, please excuse any errors that have escaped final proofreading.     Diagnosis     Clinical Impression: 1. Migraine without status migrainosus, not intractable, unspecified migraine type

## 2019-07-23 NOTE — ED TRIAGE NOTES
C/o headache x 3 hours PTA in ED. Pt reports hx of migraines and she usually takes Fioricet to manage but ran out of medication.

## 2019-07-23 NOTE — ED NOTES
Pt presents to ED ambulatory complaining of headache x3-5 hrs PTA. Pt reports taking BCs and excedrine migraine without relief. Pt reports sensitivity to light. Lights dimmed for comfort. Pt is alert and oriented x 4, RR even and unlabored, skin is warm and dry. Assessment completed and pt updated on plan of care. Emergency Department Nursing Plan of Care       The Nursing Plan of Care is developed from the Nursing assessment and Emergency Department Attending provider initial evaluation. The plan of care may be reviewed in the ED Provider note.     The Plan of Care was developed with the following considerations:   Patient / Family readiness to learn indicated by:verbalized understanding  Persons(s) to be included in education: patient  Barriers to Learning/Limitations:No    Signed     Nimco Miles RN    7/23/2019   7:57 PM

## 2019-07-24 NOTE — DISCHARGE INSTRUCTIONS
Patient Education        Migraine Headache: Care Instructions  Your Care Instructions  Migraines are painful, throbbing headaches that often start on one side of the head. They may cause nausea and vomiting and make you sensitive to light, sound, or smell. Without treatment, migraines can last from 4 hours to a few days. Medicines can help prevent migraines or stop them after they have started. Your doctor can help you find which ones work best for you. Follow-up care is a key part of your treatment and safety. Be sure to make and go to all appointments, and call your doctor if you are having problems. It's also a good idea to know your test results and keep a list of the medicines you take. How can you care for yourself at home? · Do not drive if you have taken a prescription pain medicine. · Rest in a quiet, dark room until your headache is gone. Close your eyes, and try to relax or go to sleep. Don't watch TV or read. · Put a cold, moist cloth or cold pack on the painful area for 10 to 20 minutes at a time. Put a thin cloth between the cold pack and your skin. · Use a warm, moist towel or a heating pad set on low to relax tight shoulder and neck muscles. · Have someone gently massage your neck and shoulders. · Take your medicines exactly as prescribed. Call your doctor if you think you are having a problem with your medicine. You will get more details on the specific medicines your doctor prescribes. · Be careful not to take pain medicine more often than the instructions allow. You could get worse or more frequent headaches when the medicine wears off. To prevent migraines  · Keep a headache diary so you can figure out what triggers your headaches. Avoiding triggers may help you prevent headaches. Record when each headache began, how long it lasted, and what the pain was like.  (Was it throbbing, aching, stabbing, or dull?) Write down any other symptoms you had with the headache, such as nausea, flashing lights or dark spots, or sensitivity to bright light or loud noise. Note if the headache occurred near your period. List anything that might have triggered the headache. Triggers may include certain foods (chocolate, cheese, wine) or odors, smoke, bright light, stress, or lack of sleep. · If your doctor has prescribed medicine for your migraines, take it as directed. You may have medicine that you take only when you get a migraine and medicine that you take all the time to help prevent migraines. ? If your doctor has prescribed medicine for when you get a headache, take it at the first sign of a migraine, unless your doctor has given you other instructions. ? If your doctor has prescribed medicine to prevent migraines, take it exactly as prescribed. Call your doctor if you think you are having a problem with your medicine. · Find healthy ways to deal with stress. Migraines are most common during or right after stressful times. Take time to relax before and after you do something that has caused a migraine in the past.  · Try to keep your muscles relaxed by keeping good posture. Check your jaw, face, neck, and shoulder muscles for tension. Try to relax them. When you sit at a desk, change positions often. And make sure to stretch for 30 seconds each hour. · Get plenty of sleep and exercise. · Eat meals on a regular schedule. Avoid foods and drinks that often trigger migraines. These include chocolate, alcohol (especially red wine and port), aspartame, monosodium glutamate (MSG), and some additives found in foods (such as hot dogs, cee, cold cuts, aged cheeses, and pickled foods). · Limit caffeine. Don't drink too much coffee, tea, or soda. But don't quit caffeine suddenly. That can also give you migraines. · Do not smoke or allow others to smoke around you. If you need help quitting, talk to your doctor about stop-smoking programs and medicines.  These can increase your chances of quitting for good.  · If you are taking birth control pills or hormone therapy, talk to your doctor about whether they are triggering your migraines. When should you call for help? Call 911 anytime you think you may need emergency care. For example, call if:    · You have signs of a stroke. These may include:  ? Sudden numbness, paralysis, or weakness in your face, arm, or leg, especially on only one side of your body. ? Sudden vision changes. ? Sudden trouble speaking. ? Sudden confusion or trouble understanding simple statements. ? Sudden problems with walking or balance. ? A sudden, severe headache that is different from past headaches.    Call your doctor now or seek immediate medical care if:    · You have new or worse nausea and vomiting.     · You have a new or higher fever.     · Your headache gets much worse.    Watch closely for changes in your health, and be sure to contact your doctor if:    · You are not getting better after 2 days (48 hours). Where can you learn more? Go to http://bethany-nia.info/. Enter L449 in the search box to learn more about \"Migraine Headache: Care Instructions. \"  Current as of: March 28, 2019  Content Version: 12.1  © 4038-1336 [employer]. Care instructions adapted under license by [employer] (which disclaims liability or warranty for this information). If you have questions about a medical condition or this instruction, always ask your healthcare professional. [place] [address] any warranty or liability for your use of this information.

## 2019-07-24 NOTE — ED NOTES
Discharge instructions were given to the patient by Colleen Cerda RN. The patient left the Emergency Department ambulatory, alert and oriented and in no acute distress with 2 prescription and a note . The patient was encouraged to call or return to the ED for worsening issues or problems and was encouraged to schedule a follow up appointment for continuing care. The patient verbalized understanding of discharge instructions and prescriptions, all questions were answered. The patient has no further concerns at this time.

## 2019-10-26 ENCOUNTER — HOSPITAL ENCOUNTER (EMERGENCY)
Age: 34
Discharge: HOME | End: 2019-10-26
Attending: EMERGENCY MEDICINE
Payer: COMMERCIAL

## 2019-10-26 VITALS
DIASTOLIC BLOOD PRESSURE: 88 MMHG | WEIGHT: 183 LBS | RESPIRATION RATE: 16 BRPM | SYSTOLIC BLOOD PRESSURE: 137 MMHG | OXYGEN SATURATION: 100 % | BODY MASS INDEX: 28.72 KG/M2 | HEART RATE: 91 BPM | HEIGHT: 67 IN | TEMPERATURE: 98.5 F

## 2019-10-26 DIAGNOSIS — B96.89 BV (BACTERIAL VAGINOSIS): ICD-10-CM

## 2019-10-26 DIAGNOSIS — N76.0 BV (BACTERIAL VAGINOSIS): ICD-10-CM

## 2019-10-26 DIAGNOSIS — B37.31 YEAST VAGINITIS: Primary | ICD-10-CM

## 2019-10-26 DIAGNOSIS — N89.8 VAGINAL DISCHARGE: ICD-10-CM

## 2019-10-26 LAB
APPEARANCE UR: CLEAR
BACTERIA URNS QL MICRO: NEGATIVE /HPF
BILIRUB UR QL: NEGATIVE
CLUE CELLS VAG QL WET PREP: NORMAL
COLOR UR: ABNORMAL
EPITH CASTS URNS QL MICRO: ABNORMAL /LPF
GLUCOSE UR STRIP.AUTO-MCNC: NEGATIVE MG/DL
HCG UR QL: NEGATIVE
HGB UR QL STRIP: ABNORMAL
KETONES UR QL STRIP.AUTO: NEGATIVE MG/DL
KOH PREP SPEC: NORMAL
LEUKOCYTE ESTERASE UR QL STRIP.AUTO: NEGATIVE
NITRITE UR QL STRIP.AUTO: NEGATIVE
PH UR STRIP: 6 [PH] (ref 5–8)
PROT UR STRIP-MCNC: NEGATIVE MG/DL
RBC #/AREA URNS HPF: ABNORMAL /HPF (ref 0–5)
SERVICE CMNT-IMP: NORMAL
SP GR UR REFRACTOMETRY: 1.02 (ref 1–1.03)
T VAGINALIS VAG QL WET PREP: NORMAL
UA: UC IF INDICATED,UAUC: ABNORMAL
UROBILINOGEN UR QL STRIP.AUTO: 1 EU/DL (ref 0.2–1)
WBC URNS QL MICRO: ABNORMAL /HPF (ref 0–4)
YEAST URNS QL MICRO: PRESENT

## 2019-10-26 PROCEDURE — 99283 EMERGENCY DEPT VISIT LOW MDM: CPT

## 2019-10-26 PROCEDURE — 74011250637 HC RX REV CODE- 250/637: Performed by: EMERGENCY MEDICINE

## 2019-10-26 PROCEDURE — 87210 SMEAR WET MOUNT SALINE/INK: CPT

## 2019-10-26 PROCEDURE — 81025 URINE PREGNANCY TEST: CPT

## 2019-10-26 PROCEDURE — 81001 URINALYSIS AUTO W/SCOPE: CPT

## 2019-10-26 PROCEDURE — 87491 CHLMYD TRACH DNA AMP PROBE: CPT

## 2019-10-26 RX ORDER — METRONIDAZOLE 500 MG/1
500 TABLET ORAL 2 TIMES DAILY
Qty: 14 TAB | Refills: 0 | Status: SHIPPED | OUTPATIENT
Start: 2019-10-26 | End: 2019-11-02

## 2019-10-26 RX ORDER — ACETAMINOPHEN 500 MG
1000 TABLET ORAL
Status: COMPLETED | OUTPATIENT
Start: 2019-10-26 | End: 2019-10-26

## 2019-10-26 RX ORDER — NAPROXEN 250 MG/1
500 TABLET ORAL ONCE
Status: DISCONTINUED | OUTPATIENT
Start: 2019-10-26 | End: 2019-10-26

## 2019-10-26 RX ORDER — FLUCONAZOLE 150 MG/1
150 TABLET ORAL
Status: COMPLETED | OUTPATIENT
Start: 2019-10-26 | End: 2019-10-26

## 2019-10-26 RX ADMIN — FLUCONAZOLE 150 MG: 150 TABLET ORAL at 02:23

## 2019-10-26 RX ADMIN — ACETAMINOPHEN 1000 MG: 500 TABLET, FILM COATED ORAL at 01:44

## 2019-10-26 NOTE — ED TRIAGE NOTES
Pt presents to the ED with c/o lower abdominal cramping since Tuesday after using clem pearls to \"detox her vagina. \" pt reported taking aleve for the cramps but denies it helping. Pt reports having thick white discharge that is \"only present when she wipes but isn't in her draws. \" pt stated \"you leave the pearls in your vagina and it detox's it. You know like has herbs in it to get rid of bacteria and stuff. So when it comes out you're suppose to have things come out of your vagina but nothing came out when I did it. i just had pain, you know what Im saying? \" pt reports nausea and urinary frequency. LMP was 9/28/19.

## 2019-10-26 NOTE — LETTER
10/30/2019 101[address] [facility] [address] [facility], [place] [address] [id number] Dear Ms. Kiko Francis You were seen in the Emergency Department of [address] [place] [address] on 10/26/19 and had lab tests performed. We would like to discuss these results with you . Please call the Emergency Department at your earliest convenience at [phone], to speak with one of our providers. The chlamydia test from your Emergency Department visit on 10/26/19 was positive. You and your partner need to be treated so please contact the Emergency Department at [phone]. Sincerely, Daren Murphy PA-C 
 
[facility] EMERGENCY DEPT 
[address] [place] [id number] [phone]

## 2019-10-26 NOTE — ED PROVIDER NOTES
EMERGENCY DEPARTMENT HISTORY AND PHYSICAL EXAM      Please note that this dictation was completed with Du[employer] Chinese, the computer voice recognition software. Quite often unanticipated grammatical, syntax, homophones, and other interpretive errors are inadvertently transcribed by the computer software. Please disregard these errors and any errors that have escaped final proofreading. Thank you. Date: 10/26/2019  Patient Name: Baudilio Churchill  Patient Age and Sex: 29 y.o. female    History of Presenting Illness     Chief Complaint   Patient presents with    Abdominal Pain       History Provided By: Patient    HPI: Baudilio Churchill, 29 y.o. female with past medical history as documented below presents to the ED with c/o of lower abdominal pain, mild and crampy since Tuesday or three days ago. Pt reports using \"Butch Pearls\" to \"detox\". She reports vaginal discharge after using it. She reports mild quantity, whitish and thick. She reports associated urinary frequency. She has taken no medications PTA. She reports no concerns for STI or pregnancy. Pt denies any other alleviating or exacerbating factors. Additionally, pt specifically denies any recent fever, chills, headache, nausea, vomiting, CP, SOB, lightheadedness, dizziness, numbness, weakness, BLE swelling, heart palpitations, urinary sxs, diarrhea, constipation, melena, hematochezia, cough, or congestion. There are no other complaints, changes or physical findings at this time.      PCP: Jeanette Gomez MD    Past History   Past Medical History:  Past Medical History:   Diagnosis Date    Asthma     Delivery normal     Depression     Ectopic pregnancy     Miscarriage     Neurological disorder     migraines    Other ill-defined conditions(609.89)     ectopic pregnancy    Psychiatric disorder     anger        Past Surgical History:  Past Surgical History:   Procedure Laterality Date    ABDOMEN SURGERY PROC UNLISTED      HX CHOLECYSTECTOMY      HX GYN right oophetrecomy    HX HERNIA REPAIR  12/5/14    Open repair right inguinal hernia with mesh    HX ORTHOPAEDIC      Right arm/leg       Family History:  Family History   Problem Relation Age of Onset    Cancer Mother         breast    Hypertension Mother     Thyroid Disease Mother     Sickle Cell Trait Father     Asthma Sister     Asthma Brother     Asthma Sister     Asthma Sister    [place] Asthma Sister     Asthma Sister     Asthma Sister     Asthma Brother     Asthma Brother     Asthma Brother     Asthma Brother     Sickle Cell Anemia Brother     Asthma Brother     Diabetes Paternal Grandmother        Social History:  Social History     Tobacco Use    Smoking status: Former Smoker     Packs/day: 0.00     Types: Cigarettes     Last attempt to quit: 04/2016     Years since quitting: 3.5    Smokeless tobacco: Never Used    Tobacco comment: social smoker   Substance Use Topics    Alcohol use: Yes     Comment: occasionally    Drug use: No       Allergies: Allergies   Allergen Reactions    Ibuprofen Hives and Itching       Current Medications:  No current facility-administered medications on file prior to encounter. Current Outpatient Medications on File Prior to Encounter   Medication Sig Dispense Refill    loratadine (CLARITIN) 10 mg tablet Take 1 Tab by mouth daily. 30 Tab 0    butalbital-acetaminophen-caff (FIORICET) -40 mg per capsule Take 1 Cap by mouth every six (6) hours as needed for Pain. 12 Cap 0    albuterol (PROVENTIL HFA, VENTOLIN HFA, PROAIR HFA) 90 mcg/actuation inhaler Take 2 Puffs by inhalation every four (4) hours as needed for Wheezing. 1 Inhaler 1    albuterol (PROVENTIL VENTOLIN) 2.5 mg /3 mL (0.083 %) nebulizer solution 3 mL by Nebulization route every four (4) hours as needed for Wheezing. 24 Each 0       Review of Systems   Review of Systems   Constitutional: Negative. Negative for chills and fever. HENT: Negative.   Negative for congestion, facial swelling, rhinorrhea, sore throat, trouble swallowing and voice change. Eyes: Negative. Respiratory: Negative. Negative for apnea, cough, chest tightness, shortness of breath and wheezing. Cardiovascular: Negative. Negative for chest pain, palpitations and leg swelling. Gastrointestinal: Negative. Negative for abdominal distention, abdominal pain, blood in stool, constipation, diarrhea, nausea and vomiting. Endocrine: Negative. Negative for cold intolerance, heat intolerance and polyuria. Genitourinary: Positive for frequency and vaginal discharge. Negative for difficulty urinating, dysuria, flank pain, hematuria and urgency. Musculoskeletal: Negative. Negative for arthralgias, back pain, myalgias, neck pain and neck stiffness. Skin: Negative. Negative for color change and rash. Neurological: Negative. Negative for dizziness, syncope, facial asymmetry, speech difficulty, weakness, light-headedness, numbness and headaches. Hematological: Negative. Does not bruise/bleed easily. Psychiatric/Behavioral: Negative. Negative for confusion and self-injury. The patient is not nervous/anxious. Physical Exam   Physical Exam   Constitutional: She is oriented to person, place, and time. She appears well-developed and well-nourished. No distress. HENT:   Head: Normocephalic and atraumatic. Mouth/Throat: Oropharynx is clear and moist. No oropharyngeal exudate. Eyes: Pupils are equal, round, and reactive to light. Conjunctivae and EOM are normal.   Neck: Normal range of motion. Cardiovascular: Normal rate, regular rhythm and normal heart sounds. Exam reveals no gallop and no friction rub. No murmur heard. Pulmonary/Chest: Effort normal and breath sounds normal. No respiratory distress. She has no wheezes. She has no rales. She exhibits no tenderness. Abdominal: Soft. Bowel sounds are normal. She exhibits no distension and no mass. There is no tenderness.  There is no rebound and no guarding. Musculoskeletal: Normal range of motion. She exhibits no edema, tenderness or deformity. Neurological: She is alert and oriented to person, place, and time. She displays normal reflexes. No cranial nerve deficit. She exhibits normal muscle tone. Coordination normal.   Skin: Skin is warm. No rash noted. She is not diaphoretic. Psychiatric: She has a normal mood and affect. Nursing note and vitals reviewed. Diagnostic Study Results     Labs -  Recent Results (from the past 24 hour(s))   URINALYSIS W/ REFLEX CULTURE    Collection Time: 10/26/19  1:28 AM   Result Value Ref Range    Color YELLOW/STRAW      Appearance CLEAR CLEAR      Specific gravity 1.025 1.003 - 1.030      pH (UA) 6.0 5.0 - 8.0      Protein NEGATIVE  NEG mg/dL    Glucose NEGATIVE  NEG mg/dL    Ketone NEGATIVE  NEG mg/dL    Bilirubin NEGATIVE  NEG      Blood MODERATE (A) NEG      Urobilinogen 1.0 0.2 - 1.0 EU/dL    Nitrites NEGATIVE  NEG      Leukocyte Esterase NEGATIVE  NEG      WBC 0-4 0 - 4 /hpf    RBC 5-10 0 - 5 /hpf    Epithelial cells FEW FEW /lpf    Bacteria NEGATIVE  NEG /hpf    UA:UC IF INDICATED CULTURE NOT INDICATED BY UA RESULT CNI      Yeast PRESENT (A) NEG     WET PREP    Collection Time: 10/26/19  1:28 AM   Result Value Ref Range    Clue cells CLUE CELLS PRESENT      Wet prep NO TRICHOMONAS SEEN     KOH, OTHER SOURCES    Collection Time: 10/26/19  1:28 AM   Result Value Ref Range    Special Requests: NO SPECIAL REQUESTS      DARIEL YEAST WITH PSEUDOHYPHAE     HCG URINE, QL. - POC    Collection Time: 10/26/19  1:31 AM   Result Value Ref Range    Pregnancy test,urine (POC) NEGATIVE  NEG         Radiologic Studies -   No orders to display     CT Results  (Last 48 hours)    None        CXR Results  (Last 48 hours)    None          Medical Decision Making   I am the first provider for this patient.     I reviewed the vital signs, available nursing notes, past medical history, past surgical history, family history and social history. Vital Signs-Reviewed the patient's vital signs. Patient Vitals for the past 24 hrs:   Temp Pulse Resp BP SpO2   10/26/19 0118 98.5 °F (36.9 °C) 91 16 137/88 100 %       Pulse Oximetry Analysis - 100% on RA    Cardiac Monitor:   Rate: 91 bpm  Rhythm: Normal Sinus Rhythm      Records Reviewed: Nursing Notes, Old Medical Records, Previous electrocardiograms, Previous Radiology Studies and Previous Laboratory Studies    Provider Notes (Medical Decision Making):   Patient presents with vaginal discharge and urinary sx's; afebrile with benign exam; DDx: pregnancy, acute cystitis, STI, BV, yeast infection, Will obtain urinalysis, urine pregnancy, send pelvic labs. ED Course:   Initial assessment performed. The patients presenting problems have been discussed, and they are in agreement with the care plan formulated and outlined with them. I have encouraged them to ask questions as they arise throughout their visit. ALCOHOL/SUBSTANCE ABUSE COUNSELING:  Upon evaluation, pt endorsed recent alcohol/illicit drug use. For approximately 15 minutes, pt has been counseled on the dangers of alcohol and illicit drug use on their health, and they were encouraged to quit as soon as possible in order to decrease further risks to their health. Pt has conveyed their understanding of the risks involved should they continue to use these products. I reviewed our electronic medical record system for any past medical records that were available that may contribute to the patient's current condition, the nursing notes and vital signs from today's visit.   Shashi Hackett MD    ED Orders Placed :  Orders Placed This Encounter    WET PREP    CHLAMYDIA/GC AMPLIFIED PROBE [[id number]]    KOH, OTHER SOURCES    URINALYSIS W/ REFLEX CULTURE    POC URINE PREGNANCY TEST    HCG URINE, QL. - POC    DISCONTD: naproxen (NAPROSYN) tablet 500 mg    acetaminophen (TYLENOL) tablet 1,000 mg    fluconazole (DIFLUCAN) tablet 150 mg    acetaminophen-pamabrom (MIDOL) 500-25 mg tab    metroNIDAZOLE (FLAGYL) 500 mg tablet     ED Medications Administered:  Medications   acetaminophen (TYLENOL) tablet 1,000 mg (1,000 mg Oral Given 10/26/19 0144)   fluconazole (DIFLUCAN) tablet 150 mg (150 mg Oral Given 10/26/19 2193)        Progress Note:  Patient has been reassessed and reports feeling better and symptoms have improved significantly after ED treatment. Patient feels comfortable going home with close follow-up. Carlene Alexander's final labs and imaging have been reviewed with her and available family and/or caregiver. They have been counseled regarding her diagnosis. She verbally conveys understanding and agreement of the signs, symptoms, diagnosis, treatment and prognosis and additionally agrees to follow up as recommended with Dr. Dolores Craft MD and/or specialist in 24 - 48 hours. She also agrees with the care-plan we created together and conveys that all of her questions have been answered. I have also put together some discharge instructions for her that include: 1) educational information regarding their diagnosis, 2) how to care for their diagnosis at home, as well a 3) list of reasons why they would want to return to the ED prior to their follow-up appointment should the patient's condition change or symptoms worsen. I have answered all questions to the patient's satisfaction. Strict return precautions given. She both understood and agreed with plan as discussed. Vital signs stable for discharge. Disposition: DISCHARGE  The pt is ready for discharge. The pt's signs, symptoms, diagnosis, and discharge instructions have been discussed and pt has conveyed their understanding. The pt is to follow up as recommended or return to ER should their symptoms worsen. Plan has been discussed and pt is in agreement. PLAN:  1. Return precautions as discussed.     2.   Discharge Medication List as of 10/26/2019  2:14 AM      START taking these medications    Details   acetaminophen-pamabrom (MIDOL) 500-25 mg tab Take 1 Tab by mouth every six (6) hours as needed for Pain., Print, Disp-20 Tab, R-0      metroNIDAZOLE (FLAGYL) 500 mg tablet Take 1 Tab by mouth two (2) times a day for 7 days. , Print, Disp-14 Tab, R-0         CONTINUE these medications which have NOT CHANGED    Details   loratadine (CLARITIN) 10 mg tablet Take 1 Tab by mouth daily. , Normal, Disp-30 Tab, R-0      butalbital-acetaminophen-caff (FIORICET) -40 mg per capsule Take 1 Cap by mouth every six (6) hours as needed for Pain., Normal, Disp-12 Cap, R-0      albuterol (PROVENTIL HFA, VENTOLIN HFA, PROAIR HFA) 90 mcg/actuation inhaler Take 2 Puffs by inhalation every four (4) hours as needed for Wheezing., Print, Disp-1 Inhaler, R-1      albuterol (PROVENTIL VENTOLIN) 2.5 mg /3 mL (0.083 %) nebulizer solution 3 mL by Nebulization route every four (4) hours as needed for Wheezing., Print, Disp-24 Each, R-0           3. Follow-up Information     Follow up With Specialties Details Why Contact Info    Lennox Areas, MD [address] [address]  [phone]      [address] [place] [address] EMERGENCY DEPT Emergency Medicine  As needed, If symptoms worsen [facility]  [phone]          Return to ED if worse  Diagnosis     Clinical Impression:   1. Yeast vaginitis    2. BV (bacterial vaginosis)    3. Vaginal discharge        Attestation:  I personally performed the services described in this documentation on this date 10/26/2019 for patient, Silvana Howard. Ortiz Trotter MD      This note will not be viewable in [address]

## 2019-10-26 NOTE — ED NOTES
Pt presents to ED ambulatory complaining of lower abdominal pain x Tuesday after she used 'Butch Pearls.' Pt reporting white vaginal discharge when she \"wipes. \" Pt also reporting urinary frequency. Pt is alert and oriented x 4, RR even and unlabored, skin is warm and dry. Assessment completed and pt updated on plan of care. Emergency Department Nursing Plan of Care       The Nursing Plan of Care is developed from the Nursing assessment and Emergency Department Attending provider initial evaluation. The plan of care may be reviewed in the ED Provider note.     The Plan of Care was developed with the following considerations:   Patient / Family readiness to learn indicated by:verbalized understanding  Persons(s) to be included in education: patient  Barriers to Learning/Limitations:No    Signed     Chevy Ryan    10/26/2019   1:23 AM

## 2019-10-26 NOTE — DISCHARGE INSTRUCTIONS
Thank you for allowing us to take care of you today! We hope we addressed all of your concerns and needs. We strive to provide excellent quality care in the Emergency Department. You will receive a survey after your visit to evaluate the care you were provided. Should you receive a survey from us, we invite you to share your experience and tell us what made it excellent. It was a pleasure serving you, we invite you to share your experience with us, in our pursuit for excellence, should you be selected to receive a survey. The exam and treatment you received in the Emergency Department were for an urgent problem and are not intended as complete care. It is important that you follow up with a doctor, nurse practitioner, or physician assistant for ongoing care. If your symptoms become worse or you do not improve as expected and you are unable to reach your usual health care provider, you should return to the Emergency Department. We are available 24 hours a day. Please take your discharge instructions with you when you go to your follow-up appointment. If you have any problem arranging a follow-up appointment, contact the Emergency Department immediately. If a prescription has been provided, please have it filled as soon as possible to prevent a delay in treatment. Read the entire medication instruction sheet provided to you by the pharmacy. If you have any questions or reservations about taking the medication due to side effects or interactions with other medications, please call your primary care physician or contact the ER to speak with the charge nurse. Make an appointment with your family doctor or the physician you were referred to for follow-up of this visit as instructed on your discharge paperwork, as this is mandatory follow-up. Return to the ER if you are unable to be seen or if you are unable to be seen in a timely manner.     If you have any problem arranging the follow-up visit, contact the Emergency Department immediately. I hope you feel better and thank you again for allow us to provide you with excellent care today at [facility]!       Warmest regards,    Dylon Geronimo MD  Emergency Medicine Physician  [facility]        _____________________________________________________________________________________________________________    Vitals:    10/26/19 0118   BP: 137/88   BP 1 Location: Right arm   BP Patient Position: At rest   Pulse: 91   Resp: 16   Temp: 98.5 °F (36.9 °C)   SpO2: 100%   Weight: 83 kg (183 lb)   Height: 5' 7\" (1.702 m)       Recent Results (from the past 12 hour(s))   URINALYSIS W/ REFLEX CULTURE    Collection Time: 10/26/19  1:28 AM   Result Value Ref Range    Color YELLOW/STRAW      Appearance CLEAR CLEAR      Specific gravity 1.025 1.003 - 1.030      pH (UA) 6.0 5.0 - 8.0      Protein NEGATIVE  NEG mg/dL    Glucose NEGATIVE  NEG mg/dL    Ketone NEGATIVE  NEG mg/dL    Bilirubin NEGATIVE  NEG      Blood MODERATE (A) NEG      Urobilinogen 1.0 0.2 - 1.0 EU/dL    Nitrites NEGATIVE  NEG      Leukocyte Esterase NEGATIVE  NEG      WBC 0-4 0 - 4 /hpf    RBC 5-10 0 - 5 /hpf    Epithelial cells FEW FEW /lpf    Bacteria NEGATIVE  NEG /hpf    UA:UC IF INDICATED CULTURE NOT INDICATED BY UA RESULT CNI      Yeast PRESENT (A) NEG     WET PREP    Collection Time: 10/26/19  1:28 AM   Result Value Ref Range    Clue cells CLUE CELLS PRESENT      Wet prep NO TRICHOMONAS SEEN     KOH, OTHER SOURCES    Collection Time: 10/26/19  1:28 AM   Result Value Ref Range    Special Requests: NO SPECIAL REQUESTS      DARIEL YEAST WITH PSEUDOHYPHAE     HCG URINE, QL. - POC    Collection Time: 10/26/19  1:31 AM   Result Value Ref Range    Pregnancy test,urine (POC) NEGATIVE  NEG         No orders to display     CT Results  (Last 48 hours)    None          Local Primary Care Physicians   [facility] [phone]  MD Margarita Cruz MD Hulon Hawks, MD [facility] [phone]  Jose Washburn, [facility]  Wilmar Dhillon, MD Alexis Salas, MD Marlon Brandonkylie Murray [address] [phone]  Lavelle Cesar, MD Walter Cavanaugh MD [address] [facility] [place] [phone]  MD Augustina Nicole, MD Linda Desai MD   [facility] [phone]  [facility] JOYCEKKFL [place], MD Francoise Bhakta, MD Veliz [place], NP [address] [phone]  Batsheva Cota, MD Salazar Castaneda, MD Reginaldo Wilson, MD Keyla Gonzalez, MD Sejal Haile, MD Marcus Jameson, MD Anmol Snyder MD   [address] [facility]  Juliocesar Renteria MD [facility] [phone]  Aurea Mccormick, MD Maria De Jesus Jarquin, NP  Attila Campuzano, MD Suzi Mccollum, MD Az Oakes, MD Hugh Castro, MD Loulou Horta MD   [address] [facility] [phone]  Sanna Butler, MD Octaviano Mead, FNP  Sales Nose, NP  Radhika Mccullough, MD Danuta Chen, MD Deepthi Lange, MD Jl Rhoades MD [facility] [phone]  MD Esperanza Jacinto, MD Jose Angel Balderas MD Shaun Barman, MD   [address] [phone]  MD Xander Wall MD [place] [phone]  MD Luz Azul MD Kandice Harold, MD   [facility] [phone]  MD Leah Willams, MD Lizbeth Wood, MD Kalpesh Manzo, MD Fili Dean, MD Sherlyn Ridley, NP  Arminda Heredia MD [address]   [phone]  MD Ana Padilla MD Leonette Asters, MD     [address] [facility] [phone]  Jazmin Salinas, MD Dorothy Kanner, JORGEP  Kelsey Blanco, DORI Blanco, JORGEP  Elena Cam, MD Maykel James, NP   [facility] DO Chaz   Miscellaneous:  Angela Carmona MD [facility] Departments   For adult and child immunizations, family planning, TB screening, STD testing and women's health services. [facility]: [place] [phone]     [place] [address] [facility] [place]   [facility] 1822   [facility]   [address]: Josué Bhat [address] [facility] [address] [facility] [address] [phone]     [address]        [address] [place] [address]  For primary care services, woman and child wellness, and some clinics providing specialty care. [facility] -- [address] [facility] [phone]   [facility] [address] [facility] 3614 [facility] [phone]   [address] [facility] [address] [phone] [address]  [facility] [address] [place] [address] 5850  Community  [phone]   [address] [place] [address] [address] [place] [phone]   [facility] 81 [facility] [phone]   [address] [phone]   [facility]: [address]     [place] [address]38 [facility]  5850  Community  [phone]   [employer]  [address] ([email or web address])         Sexual Health/Woman Wellness Clinics   For STD/HIV testing and treatment, pregnancy testing and services, men's health, birth control services, LGBT services, and hepatitis/HPV vaccine services. [employer] [facility] [place] [employer] [address] [facility] [address] [facility] [address] [facility] 1579 [address] [facility] [phone]   [facility] [address], 5th floor [phone]   Pregnancy [address] [facility] 2201 [facility] [address]  [facility] [phone] [address] [facility] [address] [facility]   [phone]   [place]   [phone]   Women, Infant and Children's Services: [address] [phone]       [place]ebastian [address]   [phone]   [facility]   [phone]   [address]       Patient Education        Vaginal Yeast Infection: Care Instructions  Your Care Instructions    A vaginal yeast infection is caused by too many yeast cells in the vagina. This is common in women of all ages. Itching, vaginal discharge and irritation, and other symptoms can bother you. But yeast infections don't often cause other health problems. Some medicines can increase your risk of getting a yeast infection. These include antibiotics, birth control pills, hormones, and steroids. You may also be more likely to get a yeast infection if you are pregnant, have diabetes, douche, or wear tight clothes. With treatment, most yeast infections get better in 2 to 3 days. Follow-up care is a key part of your treatment and safety. Be sure to make and go to all appointments, and call your doctor if you are having problems. It's also a good idea to know your test results and keep a list of the medicines you take. How can you care for yourself at home? · Take your medicines exactly as prescribed. Call your doctor if you think you are having a problem with your medicine. · Ask your doctor about over-the-counter (OTC) medicines for yeast infections. They may cost less than prescription medicines. If you use an OTC treatment, read and follow all instructions on the label. · Do not use tampons while using a vaginal cream or suppository. The tampons can absorb the medicine. Use pads instead. · Wear loose cotton clothing.  Do not wear nylon or other fabric that holds body heat and moisture close to the skin.  · Try sleeping without underwear. · Do not scratch. Relieve itching with a cold pack or a cool bath. · Do not wash your vaginal area more than once a day. Use plain water or a mild, unscented soap. Air-dry the vaginal area. · Change out of wet swimsuits after swimming. · Do not have sex until you have finished your treatment. · Do not douche. When should you call for help? Call your doctor now or seek immediate medical care if:    · You have unexpected vaginal bleeding.     · You have new or increased pain in your vagina or pelvis.    Watch closely for changes in your health, and be sure to contact your doctor if:    · You have a fever.     · You are not getting better after 2 days.     · Your symptoms come back after you finish your medicines. Where can you learn more? Go to http://bethany-nia.info/. Enter J071 in the search box to learn more about \"Vaginal Yeast Infection: Care Instructions. \"  Current as of: February 19, 2019  Content Version: 12.2  © 7757-7635 [employer]. Care instructions adapted under license by [employer] (which disclaims liability or warranty for this information). If you have questions about a medical condition or this instruction, always ask your healthcare professional. [address] any warranty or liability for your use of this information. Patient Education        Bacterial Vaginosis: Care Instructions  Your Care Instructions    Bacterial vaginosis is a type of vaginal infection. It is caused by excess growth of certain bacteria that are normally found in the vagina. Symptoms can include itching, swelling, pain when you urinate or have sex, and a gray or yellow discharge with a \"fishy\" odor. It is not considered an infection that is spread through sexual contact. Although symptoms can be annoying and uncomfortable, bacterial vaginosis does not usually cause other health problems.  However, if you have it while you are pregnant, it can cause complications. While the infection may go away on its own, most doctors use antibiotics to treat it. You may have been prescribed pills or vaginal cream. With treatment, bacterial vaginosis usually clears up in 5 to 7 days. Follow-up care is a key part of your treatment and safety. Be sure to make and go to all appointments, and call your doctor if you are having problems. It's also a good idea to know your test results and keep a list of the medicines you take. How can you care for yourself at home? · Take your antibiotics as directed. Do not stop taking them just because you feel better. You need to take the full course of antibiotics. · Do not eat or drink anything that contains alcohol if you are taking metronidazole (Flagyl). · Keep using your medicine if you start your period. Use pads instead of tampons while using a vaginal cream or suppository. Tampons can absorb the medicine. · Wear loose cotton clothing. Do not wear nylon and other materials that hold body heat and moisture close to the skin. · Do not scratch. Relieve itching with a cold pack or a cool bath. · Do not wash your vaginal area more than once a day. Use plain water or a mild, unscented soap. Do not douche. When should you call for help? Watch closely for changes in your health, and be sure to contact your doctor if:    · You have unexpected vaginal bleeding.     · You have a fever.     · You have new or increased pain in your vagina or pelvis.     · You are not getting better after 1 week.     · Your symptoms return after you finish the course of your medicine. Where can you learn more? Go to http://bethany-nia.info/. Sha Parker in the search box to learn more about \"Bacterial Vaginosis: Care Instructions. \"  Current as of: February 19, 2019  Content Version: 12.2  © 1329-2144 [employer].  Care instructions adapted under license by [employer] (which disclaims liability or warranty for this information). If you have questions about a medical condition or this instruction, always ask your healthcare professional. [address] any warranty or liability for your use of this information.

## 2019-10-26 NOTE — ED NOTES
Discharge instructions were given to the patient by Dejan Maria RN. The patient left the Emergency Department ambulatory, alert and oriented and in no acute distress with 2 prescriptions. The patient was encouraged to call or return to the ED for worsening issues or problems and was encouraged to schedule a follow up appointment for continuing care. The patient verbalized understanding of discharge instructions and prescriptions, all questions were answered. The patient has no further concerns at this time.

## 2019-10-28 LAB
C TRACH DNA SPEC QL NAA+PROBE: POSITIVE
N GONORRHOEA DNA SPEC QL NAA+PROBE: NEGATIVE
SAMPLE TYPE: ABNORMAL
SERVICE CMNT-IMP: ABNORMAL
SPECIMEN SOURCE: ABNORMAL

## 2019-11-08 ENCOUNTER — HOSPITAL ENCOUNTER (EMERGENCY)
Age: 34
Discharge: HOME | End: 2019-11-08
Attending: EMERGENCY MEDICINE
Payer: COMMERCIAL

## 2019-11-08 VITALS
SYSTOLIC BLOOD PRESSURE: 134 MMHG | HEART RATE: 69 BPM | TEMPERATURE: 98.5 F | DIASTOLIC BLOOD PRESSURE: 81 MMHG | RESPIRATION RATE: 20 BRPM | BODY MASS INDEX: 27.1 KG/M2 | WEIGHT: 173 LBS | OXYGEN SATURATION: 100 %

## 2019-11-08 DIAGNOSIS — J45.901 ASTHMA WITH ACUTE EXACERBATION, UNSPECIFIED ASTHMA SEVERITY, UNSPECIFIED WHETHER PERSISTENT: Primary | ICD-10-CM

## 2019-11-08 LAB
FLUAV AG NPH QL IA: NEGATIVE
FLUBV AG NOSE QL IA: NEGATIVE

## 2019-11-08 PROCEDURE — 99283 EMERGENCY DEPT VISIT LOW MDM: CPT

## 2019-11-08 PROCEDURE — 87804 INFLUENZA ASSAY W/OPTIC: CPT

## 2019-11-08 RX ORDER — LORATADINE 10 MG/1
10 TABLET ORAL DAILY
Qty: 20 TAB | Refills: 0 | Status: SHIPPED | OUTPATIENT
Start: 2019-11-08 | End: 2020-04-17

## 2019-11-08 RX ORDER — BENZONATATE 100 MG/1
100 CAPSULE ORAL
Qty: 30 CAP | Refills: 0 | Status: SHIPPED | OUTPATIENT
Start: 2019-11-08 | End: 2019-11-15

## 2019-11-08 RX ORDER — PREDNISONE 5 MG/1
TABLET ORAL
Qty: 21 TAB | Refills: 0 | Status: SHIPPED | OUTPATIENT
Start: 2019-11-08 | End: 2020-04-17

## 2019-11-08 RX ORDER — ALBUTEROL SULFATE 90 UG/1
2 AEROSOL, METERED RESPIRATORY (INHALATION)
Qty: 1 INHALER | Refills: 0 | Status: SHIPPED | OUTPATIENT
Start: 2019-11-08

## 2019-11-08 NOTE — ED PROVIDER NOTES
EMERGENCY DEPARTMENT HISTORY AND PHYSICAL EXAM    Date: 11/8/2019  Patient Name: German Bedolla    History of Presenting Illness     Chief Complaint   Patient presents with    Cough    Generalized Body Aches         History Provided By: Patient    Chief Complaint: body aches  Duration: 3 Days  Timing:  Acute  Location: generalized body aches  Quality: Aching  Severity: 7 out of 10  Modifying Factors: none  Associated Symptoms: cough chills      HPI: German Bedolla is a 29 y.o. female with a PMH of asthma who presents with wise body aches for the past 3 days. Patient states she is also had a cough which is been productive. Patient reports she has a history of asthma. Denies sick contacts. She denies fever. She denies sore throat ear pain runny nose. She has no other complaints at this time. PCP: Mathew Lisa MD    Current Outpatient Medications   Medication Sig Dispense Refill    predniSONE (STERAPRED) 5 mg dose pack See administration instruction per 5mg dose pack 21 Tab 0    benzonatate (TESSALON PERLES) 100 mg capsule Take 1 Cap by mouth three (3) times daily as needed for Cough for up to 7 days. 30 Cap 0    albuterol (PROVENTIL HFA, VENTOLIN HFA, PROAIR HFA) 90 mcg/actuation inhaler Take 2 Puffs by inhalation every four (4) hours as needed for Wheezing. 1 Inhaler 0    loratadine (CLARITIN) 10 mg tablet Take 1 Tab by mouth daily. 20 Tab 0    acetaminophen-pamabrom (MIDOL) 500-25 mg tab Take 1 Tab by mouth every six (6) hours as needed for Pain. 20 Tab 0    loratadine (CLARITIN) 10 mg tablet Take 1 Tab by mouth daily. 30 Tab 0    butalbital-acetaminophen-caff (FIORICET) -40 mg per capsule Take 1 Cap by mouth every six (6) hours as needed for Pain.  12 Cap 0       Past History     Past Medical History:  Past Medical History:   Diagnosis Date    Asthma     Delivery normal     Depression     Ectopic pregnancy     Miscarriage     Neurological disorder     migraines    Other ill-defined conditions(799.89)     ectopic pregnancy    Psychiatric disorder     anger        Past Surgical History:  Past Surgical History:   Procedure Laterality Date    ABDOMEN SURGERY PROC UNLISTED      HX CHOLECYSTECTOMY      HX GYN      right oophetrecomy    HX HERNIA REPAIR  12/5/14    Open repair right inguinal hernia with mesh    HX ORTHOPAEDIC      Right arm/leg       Family History:  Family History   Problem Relation Age of Onset    Cancer Mother         breast    Hypertension Mother     Thyroid Disease Mother     Sickle Cell Trait Father     Asthma Sister     Asthma Brother     Asthma Sister     Asthma Sister    [facility] Asthma Sister     Asthma Sister     Asthma Sister     Asthma Brother     Asthma Brother     Asthma Brother     Asthma Brother     Sickle Cell Anemia Brother     Asthma Brother     Diabetes Paternal Grandmother        Social History:  Social History     Tobacco Use    Smoking status: Former Smoker     Packs/day: 0.00     Types: Cigarettes     Last attempt to quit: 04/2016     Years since quitting: 3.6    Smokeless tobacco: Never Used    Tobacco comment: social smoker   Substance Use Topics    Alcohol use: Yes     Comment: occasionally    Drug use: No       Allergies: Allergies   Allergen Reactions    Ibuprofen Hives and Itching         Review of Systems   Review of Systems   Constitutional: Negative for fatigue and fever. Respiratory: Positive for cough. Negative for shortness of breath and wheezing. Cardiovascular: Negative for chest pain and palpitations. Gastrointestinal: Negative for abdominal pain. Musculoskeletal: Positive for myalgias. Negative for arthralgias, neck pain and neck stiffness. Skin: Negative for pallor and rash. Neurological: Negative for dizziness, tremors, weakness and headaches. All other systems reviewed and are negative.       Physical Exam     Vitals:    11/08/19 1740   BP: 123/84   Pulse: 73   Resp: 20   Temp: 98.5 °F (36.9 °C)   SpO2: 100%   Weight: 78.5 kg (173 lb)     Physical Exam   Constitutional: She is oriented to person, place, and time. She appears well-developed and well-nourished. No distress. HENT:   Head: Normocephalic and atraumatic. Right Ear: External ear normal.   Left Ear: External ear normal.   Nose: Nose normal.   Mouth/Throat: Oropharynx is clear and moist.   Eyes: Conjunctivae are normal.   Neck: Normal range of motion. Neck supple. Cardiovascular: Normal rate and regular rhythm. Pulmonary/Chest: Effort normal and breath sounds normal. No respiratory distress. She has no wheezes. Harsh spasmodic cough   Abdominal: Soft. Bowel sounds are normal. There is no tenderness. Musculoskeletal: Normal range of motion. Lymphadenopathy:     She has no cervical adenopathy. Neurological: She is alert and oriented to person, place, and time. No cranial nerve deficit. Coordination normal.   Skin: Skin is warm and dry. No rash noted. Psychiatric: She has a normal mood and affect. Her behavior is normal. Judgment and thought content normal.   Nursing note and vitals reviewed. Diagnostic Study Results     Labs -     Recent Results (from the past 12 hour(s))   INFLUENZA A & B AG (RAPID TEST)    Collection Time: 11/08/19  6:17 PM   Result Value Ref Range    Influenza A Antigen NEGATIVE  NEG      Influenza B Antigen NEGATIVE  NEG         Radiologic Studies -   No orders to display     CT Results  (Last 48 hours)    None        CXR Results  (Last 48 hours)    None            Medical Decision Making   I am the first provider for this patient. I reviewed the vital signs, available nursing notes, past medical history, past surgical history, family history and social history. Vital Signs-Reviewed the patient's vital signs. Records Reviewed: Nursing Notes            Disposition:  home    DISCHARGE NOTE:           Care plan outlined and precautions discussed.   Patient has no new complaints, changes, or physical findings. Results of tests were reviewed with the patient. All medications were reviewed with the patient; will d/c home with albuterol HFA sterapred tessalon perrles claritin. All of pt's questions and concerns were addressed. Patient was instructed and agrees to follow up with PCP, as well as to return to the ED upon further deterioration. Patient is ready to go home. Follow-up Information     Follow up With Specialties Details Why Contact Info    Jeanette Gomez MD [facility] In 3 days  [address]  [phone]            Current Discharge Medication List      START taking these medications    Details   predniSONE (STERAPRED) 5 mg dose pack See administration instruction per 5mg dose pack  Qty: 21 Tab, Refills: 0      benzonatate (TESSALON PERLES) 100 mg capsule Take 1 Cap by mouth three (3) times daily as needed for Cough for up to 7 days. Qty: 30 Cap, Refills: 0      !! loratadine (CLARITIN) 10 mg tablet Take 1 Tab by mouth daily. Qty: 20 Tab, Refills: 0       !! - Potential duplicate medications found. Please discuss with provider. CONTINUE these medications which have CHANGED    Details   albuterol (PROVENTIL HFA, VENTOLIN HFA, PROAIR HFA) 90 mcg/actuation inhaler Take 2 Puffs by inhalation every four (4) hours as needed for Wheezing. Qty: 1 Inhaler, Refills: 0         CONTINUE these medications which have NOT CHANGED    Details   !! loratadine (CLARITIN) 10 mg tablet Take 1 Tab by mouth daily. Qty: 30 Tab, Refills: 0       !! - Potential duplicate medications found. Please discuss with provider.       STOP taking these medications       albuterol (PROVENTIL VENTOLIN) 2.5 mg /3 mL (0.083 %) nebulizer solution Comments:   Reason for Stopping:               Provider Notes (Medical Decision Making):   DDX viral illness influenza upper respiratory infection asthma exacerbation bronchitis  Procedures:  Procedures    Please note that this dictation was completed with Dragon, computer voice recognition software. Quite often unanticipated grammatical, syntax, homophones, and other interpretive errors are inadvertently transcribed by the computer software. Please disregard these errors. Additionally, please excuse any errors that have escaped final proofreading. Diagnosis     Clinical Impression:   1.  Asthma with acute exacerbation, unspecified asthma severity, unspecified whether persistent

## 2019-11-08 NOTE — ED NOTES
Patient presents to ED with c/o cough and body aches for 3 days. Patient is alert and oriented x 4 and in no acute distress at this time. Respirations are at a regular rate, depth, and pattern. Patient updated on plan of care and has no questions or concerns at this time. Call bell within reach. Will continue to monitor. Please reference nursing assessment. Emergency Department Nursing Plan of Care       The Nursing Plan of Care is developed from the Nursing assessment and Emergency Department Attending provider initial evaluation. The plan of care may be reviewed in the ED Provider note.     The Plan of Care was developed with the following considerations:   Patient / Family readiness to learn indicated by:verbalized understanding and successful return demonstration  Persons(s) to be included in education: patient  Barriers to Learning/Limitations:No    Signed     [address] Kwadwo Pruitt RN    11/8/2019   5:40 PM

## 2019-11-08 NOTE — LETTER
[facility] EMERGENCY DEPT 
[address] [facility] [address] [address] 
[phone] Work/School Note Date: 11/8/2019 To Whom It May concern: 
 
Kasandra Boogie was seen and treated today in the emergency room by the following provider(s): 
Attending Provider: Fernando Hodgson MD 
Nurse Practitioner: Vijay Gomez NP. Kasandra Boogie may return to work on 11-12. Sincerely, Soledad Oneil NP

## 2019-11-09 NOTE — DISCHARGE INSTRUCTIONS
Patient Education     Learning About Asthma Triggers  What are triggers? When you have asthma, certain things can make your symptoms worse. These are called triggers. They include:  · Cigarette smoke or air pollution. · Things you are allergic to, such as:  ¨ Pollen, mold, or dust mites. ¨ Pet hair, skin, or saliva. · Illnesses, like colds, flu, or pneumonia. · Exercise. · Dry, cold air. How do triggers affect asthma? Triggers can make it harder for your lungs to work as they should and can lead to sudden difficulty breathing and other symptoms. When you are around a trigger, an asthma attack is more likely. If your symptoms are severe, you may need emergency treatment or have to go to the hospital for treatment. If you know what your triggers are and can avoid them, you may be able to prevent asthma attacks, reduce how often you have them, and make them less severe. What can you do to avoid triggers? The first thing is to know your triggers. When you are having symptoms, note the things around you that might be causing them. Then look for patterns in what may be triggering your symptoms. Record your triggers on a piece of paper or in an asthma diary. When you have your list of possible triggers, work with your doctor to find ways to avoid them. You also can check how well your lungs are working by measuring your peak expiratory flow (PEF) throughout the day. Your PEF may drop when you are near things that trigger symptoms. Here are some ways to avoid a few common triggers. · Do not smoke or allow others to smoke around you. If you need help quitting, talk to your doctor about stop-smoking programs and medicines. These can increase your chances of quitting for good. · If there is a lot of pollution, pollen, or dust outside, stay at home and keep your windows closed. Use an air conditioner or air filter in your home.  Check your local weather report or newspaper for air quality and pollen reports. · Get a flu shot every year. Talk to your doctor about getting a pneumococcal shot. Wash your hands often to prevent infections. · Avoid exercising outdoors in cold weather. If you are outdoors in cold weather, wear a scarf around your face and breathe through your nose. How can you manage an asthma attack? · If you have an asthma action plan, follow the plan. In general:  ¨ Use your quick-relief inhaler as directed by your doctor. If your symptoms do not get better after you use your medicine, have someone take you to the emergency room. Call an ambulance if needed. ¨ If your doctor has given you other inhaled medicines or steroid pills, take them as directed. Where can you learn more? Go to [employer]  Enter M564 in the search box to learn more about \"Learning About Asthma Triggers. \"   © 0166-1975 Healthwise, Incorporated. Care instructions adapted under license by [facility] (which disclaims liability or warranty for this information). This care instruction is for use with your licensed healthcare professional. If you have questions about a medical condition or this instruction, always ask your healthcare professional. [place] [address] any warranty or liability for your use of this information. Content Version: 31.2.108947;  Last Revised: February 23, 2012

## 2019-11-11 ENCOUNTER — HOSPITAL ENCOUNTER (OUTPATIENT)
Dept: MRI IMAGING | Age: 34
Discharge: HOME | End: 2019-11-11
Attending: ORTHOPAEDIC SURGERY
Payer: COMMERCIAL

## 2019-11-11 DIAGNOSIS — M87.00 AVN (AVASCULAR NECROSIS OF BONE) (HCC): ICD-10-CM

## 2019-11-11 DIAGNOSIS — M16.12 PRIMARY OSTEOARTHRITIS OF LEFT HIP: ICD-10-CM

## 2019-11-11 PROCEDURE — 72195 MRI PELVIS W/O DYE: CPT

## 2020-02-20 ENCOUNTER — HOSPITAL ENCOUNTER (EMERGENCY)
Age: 35
Discharge: HOME | End: 2020-02-20
Attending: EMERGENCY MEDICINE
Payer: MEDICAID

## 2020-02-20 VITALS
HEART RATE: 80 BPM | WEIGHT: 194 LBS | TEMPERATURE: 97.8 F | RESPIRATION RATE: 18 BRPM | BODY MASS INDEX: 29.4 KG/M2 | OXYGEN SATURATION: 99 % | HEIGHT: 68 IN | DIASTOLIC BLOOD PRESSURE: 91 MMHG | SYSTOLIC BLOOD PRESSURE: 133 MMHG

## 2020-02-20 DIAGNOSIS — M54.50 ACUTE BILATERAL LOW BACK PAIN WITHOUT SCIATICA: Primary | ICD-10-CM

## 2020-02-20 LAB
APPEARANCE UR: CLEAR
BACTERIA URNS QL MICRO: NEGATIVE /HPF
BILIRUB UR QL: NEGATIVE
COLOR UR: NORMAL
EPITH CASTS URNS QL MICRO: NORMAL /LPF
GLUCOSE UR STRIP.AUTO-MCNC: NEGATIVE MG/DL
HCG UR QL: NEGATIVE
HGB UR QL STRIP: NEGATIVE
KETONES UR QL STRIP.AUTO: NEGATIVE MG/DL
LEUKOCYTE ESTERASE UR QL STRIP.AUTO: NEGATIVE
NITRITE UR QL STRIP.AUTO: NEGATIVE
PH UR STRIP: 6.5 [PH] (ref 5–8)
PROT UR STRIP-MCNC: NEGATIVE MG/DL
RBC #/AREA URNS HPF: NORMAL /HPF (ref 0–5)
SP GR UR REFRACTOMETRY: 1.02 (ref 1–1.03)
SPERM URNS QL MICRO: PRESENT
UA: UC IF INDICATED,UAUC: NORMAL
UROBILINOGEN UR QL STRIP.AUTO: 1 EU/DL (ref 0.2–1)
WBC URNS QL MICRO: NORMAL /HPF (ref 0–4)

## 2020-02-20 PROCEDURE — 74011250636 HC RX REV CODE- 250/636: Performed by: PHYSICIAN ASSISTANT

## 2020-02-20 PROCEDURE — 99283 EMERGENCY DEPT VISIT LOW MDM: CPT

## 2020-02-20 PROCEDURE — 96372 THER/PROPH/DIAG INJ SC/IM: CPT

## 2020-02-20 PROCEDURE — 81025 URINE PREGNANCY TEST: CPT

## 2020-02-20 PROCEDURE — 81001 URINALYSIS AUTO W/SCOPE: CPT

## 2020-02-20 RX ORDER — NAPROXEN 500 MG/1
500 TABLET ORAL
Qty: 20 TAB | Refills: 0 | Status: SHIPPED | OUTPATIENT
Start: 2020-02-20 | End: 2020-04-17

## 2020-02-20 RX ORDER — CYCLOBENZAPRINE HCL 10 MG
10 TABLET ORAL
Qty: 15 TAB | Refills: 0 | Status: SHIPPED | OUTPATIENT
Start: 2020-02-20 | End: 2020-04-17

## 2020-02-20 RX ORDER — ACETAMINOPHEN 500 MG
1000 TABLET ORAL
Qty: 20 TAB | Refills: 0 | Status: SHIPPED | OUTPATIENT
Start: 2020-02-20 | End: 2020-04-17

## 2020-02-20 RX ORDER — KETOROLAC TROMETHAMINE 30 MG/ML
30 INJECTION, SOLUTION INTRAMUSCULAR; INTRAVENOUS
Status: COMPLETED | OUTPATIENT
Start: 2020-02-20 | End: 2020-02-20

## 2020-02-20 RX ADMIN — KETOROLAC TROMETHAMINE 30 MG: 30 INJECTION, SOLUTION INTRAMUSCULAR; INTRAVENOUS at 17:51

## 2020-02-20 NOTE — ED NOTES
Pt presents to ED ambulatory complaining of bilateral lower back that shoots around her sides x6 days. Pt also reports urinary frequency and hx of UTIs. Pt has full ROM, denies recent injury, denies numbness or tingling, denies loss of bowel or bladder function. Pt is alert and oriented x 4, RR even and unlabored, skin is warm and dry. Assessment completed and pt updated on plan of care. Emergency Department Nursing Plan of Care       The Nursing Plan of Care is developed from the Nursing assessment and Emergency Department Attending provider initial evaluation. The plan of care may be reviewed in the ED Provider note.     The Plan of Care was developed with the following considerations:   Patient / Family readiness to learn indicated by:verbalized understanding  Persons(s) to be included in education: patient  Barriers to Learning/Limitations:No    Signed     Che Taylor RN    2/20/2020   5:22 PM

## 2020-02-20 NOTE — ED TRIAGE NOTES
Pt presents to ED with c/o low back pain persisting for six days. Reports hx of UTI's. Denies dysuria or hematuria.

## 2020-02-20 NOTE — ED PROVIDER NOTES
EMERGENCY DEPARTMENT HISTORY AND PHYSICAL EXAM      Date: 2/20/2020  Patient Name: Esmer Marquez    History of Presenting Illness     Chief Complaint   Patient presents with    Back Pain     History Provided By: Patient    HPI: Esmer Marquez, 36 y.o. female with depression, asthma, ectopic pregnancy, migraine headaches, cholecystectomy, right oophorectomy, former tobacco abuse who presents ambulatory to the ED with cc of acute moderate gradual onset aching bilateral lower back pain X 6 days. Patient endorses similar symptoms with UTI in the past.  Denies any injury or trauma. Denies any heavy lifting. Aleve and BC without relief. Denies fever, chills, nausea, vomiting, dysuria, hematuria, constipation, diarrhea, abdominal pain, chest pain, shortness of breath, incontinence, saddle paresthesias, urinary retention, focal weakness. Patient endorses mild urinary frequency. LMP 2/13/2020. PCP: Ebony Jean Baptiste MD    There are no other complaints, changes, or physical findings at this time. No current facility-administered medications on file prior to encounter. Current Outpatient Medications on File Prior to Encounter   Medication Sig Dispense Refill    predniSONE (STERAPRED) 5 mg dose pack See administration instruction per 5mg dose pack 21 Tab 0    albuterol (PROVENTIL HFA, VENTOLIN HFA, PROAIR HFA) 90 mcg/actuation inhaler Take 2 Puffs by inhalation every four (4) hours as needed for Wheezing. 1 Inhaler 0    loratadine (CLARITIN) 10 mg tablet Take 1 Tab by mouth daily. 20 Tab 0    acetaminophen-pamabrom (MIDOL) 500-25 mg tab Take 1 Tab by mouth every six (6) hours as needed for Pain. 20 Tab 0    loratadine (CLARITIN) 10 mg tablet Take 1 Tab by mouth daily. 30 Tab 0    butalbital-acetaminophen-caff (FIORICET) -40 mg per capsule Take 1 Cap by mouth every six (6) hours as needed for Pain.  12 Cap 0     Past History     Past Medical History:  Past Medical History:   Diagnosis Date    Asthma     Delivery normal     Depression     Ectopic pregnancy     Miscarriage     Neurological disorder     migraines    Other ill-defined conditions(799.89)     ectopic pregnancy    Psychiatric disorder     anger      Past Surgical History:  Past Surgical History:   Procedure Laterality Date    ABDOMEN SURGERY PROC UNLISTED      HX CHOLECYSTECTOMY      HX GYN      right oophetrecomy    HX HERNIA REPAIR  12/5/14    Open repair right inguinal hernia with mesh    HX ORTHOPAEDIC      Right arm/leg     Family History:  Family History   Problem Relation Age of Onset    Cancer Mother         breast    Hypertension Mother     Thyroid Disease Mother     Sickle Cell Trait Father     Asthma Sister     Asthma Brother     Asthma Sister     Asthma Sister    Azul Asthma Sister     Asthma Sister     Asthma Sister     Asthma Brother     Asthma Brother     Asthma Brother     Asthma Brother     Sickle Cell Anemia Brother     Asthma Brother     Diabetes Paternal Grandmother      Social History:  Social History     Tobacco Use    Smoking status: Former Smoker     Packs/day: 0.00     Types: Cigarettes     Last attempt to quit: 04/2016     Years since quitting: 3.8    Smokeless tobacco: Never Used    Tobacco comment: social smoker   Substance Use Topics    Alcohol use: Yes     Comment: occasionally    Drug use: No     Allergies: Allergies   Allergen Reactions    Ibuprofen Hives and Itching     Review of Systems   Review of Systems   Constitutional: Negative for activity change, appetite change, chills, fatigue and fever. HENT: Negative. Eyes: Negative. Respiratory: Negative for cough and shortness of breath. Cardiovascular: Negative. Negative for chest pain. Gastrointestinal: Negative. Negative for abdominal pain, constipation, diarrhea, nausea and vomiting. Genitourinary: Positive for frequency.  Negative for difficulty urinating, dysuria, flank pain, genital sores, hematuria, menstrual problem, pelvic pain, vaginal bleeding, vaginal discharge and vaginal pain. Musculoskeletal: Positive for back pain. Negative for arthralgias, joint swelling, myalgias and neck pain. Skin: Negative. Negative for rash and wound. Neurological: Negative. Negative for numbness and headaches. Psychiatric/Behavioral: Negative. Physical Exam   Physical Exam  Vitals signs and nursing note reviewed. Constitutional:       General: She is not in acute distress. Appearance: She is well-developed. She is not diaphoretic. HENT:      Head: Normocephalic and atraumatic. Right Ear: External ear normal.      Left Ear: External ear normal.      Nose: Nose normal.   Eyes:      Conjunctiva/sclera: Conjunctivae normal.      Pupils: Pupils are equal, round, and reactive to light. Neck:      Musculoskeletal: Normal range of motion and neck supple. Cardiovascular:      Rate and Rhythm: Normal rate and regular rhythm. Pulses: Normal pulses. Heart sounds: Normal heart sounds. Pulmonary:      Effort: Pulmonary effort is normal.   Abdominal:      Palpations: Abdomen is soft. Tenderness: There is no abdominal tenderness. There is no right CVA tenderness, left CVA tenderness or guarding. Musculoskeletal: Normal range of motion. General: No tenderness or deformity. Cervical back: Normal.      Thoracic back: Normal.      Lumbar back: She exhibits pain. She exhibits normal range of motion, no tenderness, no bony tenderness, no swelling, no edema, no deformity, no laceration, no spasm and normal pulse. Right lower leg: No edema. Left lower leg: No edema. Comments: No crepitus, deformity, step-off, edema, instability. Neurovascular intact. Full range of motion of spine. Straight leg raise negative. Skin:     General: Skin is warm and dry. Neurological:      Mental Status: She is alert and oriented to person, place, and time.       Cranial Nerves: No cranial nerve deficit. Psychiatric:         Behavior: Behavior normal.         Thought Content: Thought content normal.         Judgment: Judgment normal.       Diagnostic Study Results   Labs -     Recent Results (from the past 12 hour(s))   URINALYSIS W/ REFLEX CULTURE    Collection Time: 02/20/20  5:14 PM   Result Value Ref Range    Color YELLOW/STRAW      Appearance CLEAR CLEAR      Specific gravity 1.025 1.003 - 1.030      pH (UA) 6.5 5.0 - 8.0      Protein NEGATIVE  NEG mg/dL    Glucose NEGATIVE  NEG mg/dL    Ketone NEGATIVE  NEG mg/dL    Bilirubin NEGATIVE  NEG      Blood NEGATIVE  NEG      Urobilinogen 1.0 0.2 - 1.0 EU/dL    Nitrites NEGATIVE  NEG      Leukocyte Esterase NEGATIVE  NEG      WBC 0-4 0 - 4 /hpf    RBC 0-5 0 - 5 /hpf    Epithelial cells FEW FEW /lpf    Bacteria NEGATIVE  NEG /hpf    UA:UC IF INDICATED CULTURE NOT INDICATED BY UA RESULT CNI      Spermatozoa PRESENT     HCG URINE, QL. - POC    Collection Time: 02/20/20  5:19 PM   Result Value Ref Range    Pregnancy test,urine (POC) NEGATIVE  NEG         Radiologic Studies -   No orders to display     No results found. Medical Decision Making   I am the first provider for this patient. I reviewed the vital signs, available nursing notes, past medical history, past surgical history, family history and social history. Vital Signs-Reviewed the patient's vital signs. Patient Vitals for the past 12 hrs:   Temp Pulse Resp BP SpO2   02/20/20 1702 97.8 °F (36.6 °C) 80 18 (!) 133/91 99 %     Pulse Oximetry Analysis - 99% on RA    Records Reviewed: Nursing Notes, Old Medical Records, Previous Radiology Studies and Previous Laboratory Studies    Provider Notes (Medical Decision Making): The patient complains of low back pain. These symptoms are consistent with a lumbar strain. Pt additionally endorses concern for UTI w/ history of similar sxs.  Less likely aortic dissection or AAA, or cauda equina given that there are no red flags and a benign physical exam. I have recommended rest, avoiding heavy lifting until better, use of intermittent heat (avoid sleeping on a heating pad), and use of OTC NSAID's (Advil, Aleve etc) or Tylenol prn for pain. Call PCP if back pain persists or she develops leg symptoms. It has also been explained that this may take up to three months to fully resolved and that smoking may slow that process even more. ED Course:   Initial assessment performed. The patients presenting problems have been discussed, and they are in agreement with the care plan formulated and outlined with them. I have encouraged them to ask questions as they arise throughout their visit. Progress Note:   Updated pt on all returned results and findings. Discussed the importance of proper follow up as referred below along with return precautions. Pt in agreement with the care plan and expresses agreement with and understanding of all items discussed. Disposition:  5:30 PM  I have discussed with patient their diagnosis, treatment, and follow up plan. The patient agrees to follow up as outlined in discharge paperwork and also to return to the ED with any worsening. Debbie Boeck, PA-C      PLAN:  1. Current Discharge Medication List      START taking these medications    Details   naproxen (NAPROSYN) 500 mg tablet Take 1 Tab by mouth two (2) times daily as needed for Pain. Qty: 20 Tab, Refills: 0      acetaminophen (TYLENOL) 500 mg tablet Take 2 Tabs by mouth every six (6) hours as needed for Pain. Qty: 20 Tab, Refills: 0      cyclobenzaprine (FLEXERIL) 10 mg tablet Take 1 Tab by mouth three (3) times daily as needed for Muscle Spasm(s).   Qty: 15 Tab, Refills: 0         CONTINUE these medications which have NOT CHANGED    Details   predniSONE (STERAPRED) 5 mg dose pack See administration instruction per 5mg dose pack  Qty: 21 Tab, Refills: 0      albuterol (PROVENTIL HFA, VENTOLIN HFA, PROAIR HFA) 90 mcg/actuation inhaler Take 2 Puffs by inhalation every four (4) hours as needed for Wheezing. Qty: 1 Inhaler, Refills: 0      !! loratadine (CLARITIN) 10 mg tablet Take 1 Tab by mouth daily. Qty: 20 Tab, Refills: 0      acetaminophen-pamabrom (MIDOL) 500-25 mg tab Take 1 Tab by mouth every six (6) hours as needed for Pain. Qty: 20 Tab, Refills: 0      !! loratadine (CLARITIN) 10 mg tablet Take 1 Tab by mouth daily. Qty: 30 Tab, Refills: 0      butalbital-acetaminophen-caff (FIORICET) -40 mg per capsule Take 1 Cap by mouth every six (6) hours as needed for Pain. Qty: 12 Cap, Refills: 0       !! - Potential duplicate medications found. Please discuss with provider. 2.   Follow-up Information     Follow up With Specialties Details Why Contact Info    Wily Lancaster MD [facility] Schedule an appointment as soon as possible for a visit in 1 week As needed, If symptoms worsen [place] [address]  [phone]          Return to ED if worse     Diagnosis     Clinical Impression:   1. Acute bilateral low back pain without sciatica            Please note that this dictation was completed with Dragon, computer voice recognition software. Quite often unanticipated grammatical, syntax, homophones, and other interpretive errors are inadvertently transcribed by the computer software. Please disregard these errors. Additionally, please excuse any errors that have escaped final proofreading.

## 2020-02-20 NOTE — DISCHARGE INSTRUCTIONS
Patient Education        Learning About Relief for Back Pain  What is back tension and strain? Back strain happens when you overstretch, or pull, a muscle in your back. You may hurt your back in an accident or when you exercise or lift something. Most back pain will get better with rest and time. You can take care of yourself at home to help your back heal.  What can you do first to relieve back pain? When you first feel back pain, try these steps:  · Walk. Take a short walk (10 to 20 minutes) on a level surface (no slopes, hills, or stairs) every 2 to 3 hours. Walk only distances you can manage without pain, especially leg pain. · Relax. Find a comfortable position for rest. Some people are comfortable on the floor or a medium-firm bed with a small pillow under their head and another under their knees. Some people prefer to lie on their side with a pillow between their knees. Don't stay in one position for too long. · Try heat or ice. Try using a heating pad on a low or medium setting, or take a warm shower, for 15 to 20 minutes every 2 to 3 hours. Or you can buy single-use heat wraps that last up to 8 hours. You can also try an ice pack for 10 to 15 minutes every 2 to 3 hours. You can use an ice pack or a bag of frozen vegetables wrapped in a thin towel. There is not strong evidence that either heat or ice will help, but you can try them to see if they help. You may also want to try switching between heat and cold. · Take pain medicine exactly as directed. ¨ If the doctor gave you a prescription medicine for pain, take it as prescribed. ¨ If you are not taking a prescription pain medicine, ask your doctor if you can take an over-the-counter medicine. What else can you do? · Stretch and exercise. Exercises that increase flexibility may relieve your pain and make it easier for your muscles to keep your spine in a good, neutral position. And don't forget to keep walking. · Do self-massage.  You can use self-massage to unwind after work or school or to energize yourself in the morning. You can easily massage your feet, hands, or neck. Self-massage works best if you are in comfortable clothes and are sitting or lying in a comfortable position. Use oil or lotion to massage bare skin. · Reduce stress. Back pain can lead to a vicious [place]: Distress about the pain tenses the muscles in your back, which in turn causes more pain. Learn how to relax your mind and your muscles to lower your stress. Where can you learn more? Go to http://bethany-nia.info/. Enter E431 in the search box to learn more about \"Learning About Relief for Back Pain. \"  Current as of: March 21, 2017  Content Version: 11.5  © 1369-9170 Healthwise, Incorporated. Care instructions adapted under license by [employer] (which disclaims liability or warranty for this information). If you have questions about a medical condition or this instruction, always ask your healthcare professional. [address] any warranty or liability for your use of this information. 14do M presents with facial rash 14do M presents with facial rash. Reports onset 1 day ago over forehead and cheeks. Denies spreading of rash elsewhere on body. Denies fevers, vomiting, diarrhea.  born full term at 39wks gestation via , no complications in pregnancy or birth. Reports  now feeding 2oz every 3 hours, reports many wet diapers and loose stool. Reports umbilical stump  while in ED triage today.

## 2020-04-17 ENCOUNTER — HOSPITAL ENCOUNTER (EMERGENCY)
Age: 35
Discharge: HOME | End: 2020-04-17
Attending: EMERGENCY MEDICINE
Payer: MEDICAID

## 2020-04-17 VITALS
HEIGHT: 68 IN | WEIGHT: 195 LBS | BODY MASS INDEX: 29.55 KG/M2 | TEMPERATURE: 98.3 F | SYSTOLIC BLOOD PRESSURE: 136 MMHG | RESPIRATION RATE: 18 BRPM | HEART RATE: 98 BPM | DIASTOLIC BLOOD PRESSURE: 90 MMHG | OXYGEN SATURATION: 98 %

## 2020-04-17 DIAGNOSIS — J02.9 ACUTE PHARYNGITIS, UNSPECIFIED ETIOLOGY: Primary | ICD-10-CM

## 2020-04-17 LAB — DEPRECATED S PYO AG THROAT QL EIA: POSITIVE

## 2020-04-17 PROCEDURE — 99282 EMERGENCY DEPT VISIT SF MDM: CPT

## 2020-04-17 PROCEDURE — 87880 STREP A ASSAY W/OPTIC: CPT

## 2020-04-17 RX ORDER — ACETAMINOPHEN 500 MG
1000 TABLET ORAL
Qty: 20 TAB | Refills: 0 | Status: SHIPPED | OUTPATIENT
Start: 2020-04-17 | End: 2020-07-20

## 2020-04-17 RX ORDER — AMOXICILLIN 875 MG/1
875 TABLET, FILM COATED ORAL 2 TIMES DAILY
Qty: 20 TAB | Refills: 0 | Status: SHIPPED | OUTPATIENT
Start: 2020-04-17 | End: 2020-04-27

## 2020-04-17 NOTE — PROGRESS NOTES
Date of previous inpatient admission/ ED visit? 2/24/2020 Back Pain    What brought the patient back to ED? Pt reports sore throat, HA, body aches x2 days. Did patient decline recommended services during last admission/ ED visit (if yes, what)? no    Has patient seen a provider since their last inpatient admission/ED visit (if yes, when)? no    PCP: First and Last name: Dr. Russell Fuastin. Name of Practice:    Are you a current patient: Yes/No:  yes   Approximate date of last visit: unknown    CM Interventions:  From previous inpatient admission/ED visit: Follow-up with PCP  From current inpatient admission/ED visit:  Follow-up with GYN.     [facility]  [phone]

## 2020-04-17 NOTE — ED PROVIDER NOTES
EMERGENCY DEPARTMENT HISTORY AND PHYSICAL EXAM    Date: 4/17/2020  Patient Name: Jose Maria Felipe    History of Presenting Illness     Chief Complaint   Patient presents with    Sore Throat         History Provided By: Patient    Chief Complaint: sore throat  Duration: 2 Days  Timing:  Acute  Location: back of throat  Quality: Burning  Severity: 8 out of 10  Modifying Factors: painful to swallow  Associated Symptoms: denies any other associated signs or symptoms      HPI: Jose Maria Felipe is a 36 y.o. female with a PMH of No significant past medical history who presents with sore throat acute onset days ago. Patient also reports body aches and headache. Mother sick with similar symptoms. Patient specifically denies cough shortness of breath fever abdominal pain nausea vomiting diarrhea . PCP: Aung Iraheta MD    Current Outpatient Medications   Medication Sig Dispense Refill    amoxicillin (AMOXIL) 875 mg tablet Take 1 Tab by mouth two (2) times a day for 10 days. 20 Tab 0    acetaminophen (Tylenol Extra Strength) 500 mg tablet Take 2 Tabs by mouth every six (6) hours as needed for Pain. 20 Tab 0    albuterol (PROVENTIL HFA, VENTOLIN HFA, PROAIR HFA) 90 mcg/actuation inhaler Take 2 Puffs by inhalation every four (4) hours as needed for Wheezing.  1 Inhaler 0       Past History     Past Medical History:  Past Medical History:   Diagnosis Date    Asthma     Delivery normal     Depression     Ectopic pregnancy     Miscarriage     Neurological disorder     migraines    Other ill-defined conditions(799.89)     ectopic pregnancy    Psychiatric disorder     anger        Past Surgical History:  Past Surgical History:   Procedure Laterality Date    ABDOMEN SURGERY PROC UNLISTED      HX CHOLECYSTECTOMY      HX GYN      right oophetrecomy    HX HERNIA REPAIR  12/5/14    Open repair right inguinal hernia with mesh    HX ORTHOPAEDIC      Right arm/leg       Family History:  Family History   Problem Relation Age of Onset    Cancer Mother         breast    Hypertension Mother     Thyroid Disease Mother     Sickle Cell Trait Father     Asthma Sister     Asthma Brother     Asthma Sister     Asthma Sister     Asthma Sister     Asthma Sister     Asthma Sister     Asthma Brother     Asthma Brother     Asthma Brother     Asthma Brother     Sickle Cell Anemia Brother     Asthma Brother     Diabetes Paternal Grandmother        Social History:  Social History     Tobacco Use    Smoking status: Current Some Day Smoker     Packs/day: 0.00     Types: Cigarettes     Last attempt to quit: 2016     Years since quittin.0    Smokeless tobacco: Never Used    Tobacco comment: social smoker   Substance Use Topics    Alcohol use: Yes     Comment: occasionally    Drug use: No       Allergies: Allergies   Allergen Reactions    Ibuprofen Hives and Itching         Review of Systems   Review of Systems   Constitutional: Negative for fatigue and fever. HENT: Positive for sore throat. Respiratory: Negative for shortness of breath and wheezing. Cardiovascular: Negative for chest pain and palpitations. Gastrointestinal: Negative for abdominal pain. Musculoskeletal: Positive for myalgias. Negative for arthralgias, neck pain and neck stiffness. Skin: Negative for pallor and rash. Neurological: Positive for headaches. Negative for dizziness, tremors and weakness. All other systems reviewed and are negative. Physical Exam     Vitals:    20 1653   BP: 136/90   Pulse: 98   Resp: 18   Temp: 98.3 °F (36.8 °C)   SpO2: 98%   Weight: 88.5 kg (195 lb)   Height: 5' 8\" (1.727 m)     Physical Exam  Vitals signs and nursing note reviewed. Constitutional:       General: She is not in acute distress. Appearance: She is well-developed. HENT:      Head: Normocephalic and atraumatic.       Right Ear: Tympanic membrane, ear canal and external ear normal.      Left Ear: Tympanic membrane, ear canal and external ear normal.      Nose: Nose normal.      Mouth/Throat:      Mouth: Mucous membranes are moist.      Pharynx: Uvula midline. Eyes:      Conjunctiva/sclera: Conjunctivae normal.   Neck:      Musculoskeletal: Normal range of motion and neck supple. Cardiovascular:      Rate and Rhythm: Normal rate and regular rhythm. Heart sounds: Normal heart sounds. Pulmonary:      Effort: Pulmonary effort is normal. No respiratory distress. Breath sounds: Normal breath sounds. No wheezing. Abdominal:      General: Bowel sounds are normal.      Palpations: Abdomen is soft. Tenderness: There is no abdominal tenderness. Musculoskeletal: Normal range of motion. Lymphadenopathy:      Cervical: No cervical adenopathy. Skin:     General: Skin is warm and dry. Findings: No rash. Neurological:      Mental Status: She is alert and oriented to person, place, and time. Cranial Nerves: No cranial nerve deficit. Coordination: Coordination normal.   Psychiatric:         Behavior: Behavior normal.         Thought Content: Thought content normal.         Judgment: Judgment normal.           Diagnostic Study Results     Labs -     Recent Results (from the past 12 hour(s))   STREP AG SCREEN, GROUP A    Collection Time: 04/17/20  5:02 PM   Result Value Ref Range    Group A Strep Ag ID Positive (A) NEG         Radiologic Studies -   No orders to display     CT Results  (Last 48 hours)    None        CXR Results  (Last 48 hours)    None            Medical Decision Making   I am the first provider for this patient. I reviewed the vital signs, available nursing notes, past medical history, past surgical history, family history and social history. Vital Signs-Reviewed the patient's vital signs. Records Reviewed: Nursing Notes            Disposition:  home    DISCHARGE NOTE:         Care plan outlined and precautions discussed.   Patient has no new complaints, changes, or physical findings. Results of tests were reviewed with the patient. All medications were reviewed with the patient; will d/c home with amoxicillin motrin. All of pt's questions and concerns were addressed. Patient was instructed and agrees to follow up with PCP, as well as to return to the ED upon further deterioration. Patient is ready to go home. Follow-up Information     Follow up With Specialties Details Why Contact Info    Eliezer Angel MD [facility] In 3 days  [address]  [phone]            Discharge Medication List as of 4/17/2020  5:40 PM      START taking these medications    Details   amoxicillin (AMOXIL) 875 mg tablet Take 1 Tab by mouth two (2) times a day for 10 days. , Normal, Disp-20 Tab, R-0      acetaminophen (Tylenol Extra Strength) 500 mg tablet Take 2 Tabs by mouth every six (6) hours as needed for Pain., Normal, Disp-20 Tab, R-0         CONTINUE these medications which have NOT CHANGED    Details   albuterol (PROVENTIL HFA, VENTOLIN HFA, PROAIR HFA) 90 mcg/actuation inhaler Take 2 Puffs by inhalation every four (4) hours as needed for Wheezing., Normal, Disp-1 Inhaler, R-0             Provider Notes (Medical Decision Making):   DDX URI strep v viral pharyngitis  Procedures:  Procedures    Please note that this dictation was completed with Dragon, computer voice recognition software. Quite often unanticipated grammatical, syntax, homophones, and other interpretive errors are inadvertently transcribed by the computer software. Please disregard these errors. Additionally, please excuse any errors that have escaped final proofreading. Diagnosis     Clinical Impression:   1.  Acute pharyngitis, unspecified etiology

## 2020-04-17 NOTE — ED NOTES
Pt presents to ED ambulatory complaining of complaining of headaches, generalized body aches, chills, sore throat and nasal congestion x2 days. Pt reports taking nyquil and \"allergy pills. \" Pt is alert and oriented x 4, RR even and unlabored, skin is warm and dry. Assessment completed and pt updated on plan of care. Emergency Department Nursing Plan of Care       The Nursing Plan of Care is developed from the Nursing assessment and Emergency Department Attending provider initial evaluation. The plan of care may be reviewed in the ED Provider note.     The Plan of Care was developed with the following considerations:   Patient / Family readiness to learn indicated by:verbalized understanding  Persons(s) to be included in education: patient  Barriers to Learning/Limitations:No    Signed     Kerry Santos RN    4/17/2020   5:14 PM

## 2020-04-17 NOTE — ED NOTES
Discharge instructions were given to the patient by Gabriele Goncalves RN. The patient left the Emergency Department ambulatory, alert and oriented and in no acute distress with 2 prescriptions. The patient was encouraged to call or return to the ED for worsening issues or problems and was encouraged to schedule a follow up appointment for continuing care. The patient verbalized understanding of discharge instructions and prescriptions, all questions were answered. The patient has no further concerns at this time.

## 2020-04-17 NOTE — DISCHARGE INSTRUCTIONS

## 2020-04-20 ENCOUNTER — PATIENT OUTREACH (OUTPATIENT)
Dept: CASE MANAGEMENT | Age: 35
End: 2020-04-20

## 2020-04-20 NOTE — PROGRESS NOTES
Patient contacted regarding COVID-19  risk. Care Transition Nurse/ Ambulatory Care Manager contacted the patient by telephone to perform post discharge assessment. Verified name and  with patient as identifiers. Provided introduction to self, and explanation of the CTN/ACM role, and reason for call due to risk factors for infection and/or exposure to COVID-19. Symptoms reviewed with patient who verbalized the following symptoms: pain or aching joints, cough, chills or shaking, chest pain, fast heart rate, flank pain and no worsening symptoms. Pt states that she does NOT have flank pain. Pt states that she has 7/10 chest pain and pt was advised to call PCP immediately. Pt reports that he daughter went to the ED at the same time and is home with a sore throat. Pt reports no new symptoms and that she does not have a thermometer. Due to no new or worsening symptoms encounter was not routed to provider for escalation. Patient has following risk factors of: asthma and ED visit to [facility] on 2020. CTN/ACM reviewed discharge instructions, medical action plan and red flags such as increased shortness of breath, increasing fever and signs of decompensation with patient who verbalized understanding. Discussed exposure protocols and quarantine with CDC Guidelines What to do if you are sick with coronavirus disease .  Patient was given an opportunity for questions and concerns. The patient agrees to contact the [employer] exposure line [phone], [address]  and PCP office for questions related to their healthcare. CTN/ACM provided contact information for future needs. Reviewed and educated patient on any new and changed medications related to discharge diagnosis. Patient/family/caregiver given information for [employer] and agrees to enroll yes  Patient's preferred e-mail:  [email or web address]  Patient's preferred phone number: [phone]  Based on Loop alert triggers, patient will be contacted by nurse care manager for worsening symptoms. Plan for follow-up call in 1-2 days based on severity of symptoms and risk factors.

## 2020-04-22 ENCOUNTER — HOSPITAL ENCOUNTER (EMERGENCY)
Age: 35
Discharge: HOME | End: 2020-04-22
Attending: EMERGENCY MEDICINE
Payer: MEDICAID

## 2020-04-22 VITALS
OXYGEN SATURATION: 100 % | DIASTOLIC BLOOD PRESSURE: 82 MMHG | TEMPERATURE: 98.3 F | SYSTOLIC BLOOD PRESSURE: 152 MMHG | HEART RATE: 98 BPM | RESPIRATION RATE: 16 BRPM

## 2020-04-22 DIAGNOSIS — G43.909 MIGRAINE WITHOUT STATUS MIGRAINOSUS, NOT INTRACTABLE, UNSPECIFIED MIGRAINE TYPE: ICD-10-CM

## 2020-04-22 DIAGNOSIS — R07.89 ATYPICAL CHEST PAIN: Primary | ICD-10-CM

## 2020-04-22 PROCEDURE — 74011000250 HC RX REV CODE- 250: Performed by: EMERGENCY MEDICINE

## 2020-04-22 PROCEDURE — 99283 EMERGENCY DEPT VISIT LOW MDM: CPT

## 2020-04-22 PROCEDURE — 93005 ELECTROCARDIOGRAM TRACING: CPT

## 2020-04-22 PROCEDURE — 74011250637 HC RX REV CODE- 250/637: Performed by: EMERGENCY MEDICINE

## 2020-04-22 RX ORDER — BUTALBITAL, ACETAMINOPHEN AND CAFFEINE 50; 325; 40 MG/1; MG/1; MG/1
1 TABLET ORAL
Qty: 20 TAB | Refills: 0 | Status: SHIPPED | OUTPATIENT
Start: 2020-04-22 | End: 2020-07-20

## 2020-04-22 RX ORDER — BUTALBITAL, ACETAMINOPHEN AND CAFFEINE 50; 325; 40 MG/1; MG/1; MG/1
2 TABLET ORAL
Status: COMPLETED | OUTPATIENT
Start: 2020-04-22 | End: 2020-04-22

## 2020-04-22 RX ORDER — OMEPRAZOLE 20 MG/1
20 CAPSULE, DELAYED RELEASE ORAL DAILY
Qty: 30 CAP | Refills: 0 | Status: SHIPPED | OUTPATIENT
Start: 2020-04-22 | End: 2020-05-22

## 2020-04-22 RX ADMIN — BUTALBITAL, ACETAMINOPHEN, AND CAFFEINE 2 TABLET: 50; 325; 40 TABLET, COATED ORAL at 18:09

## 2020-04-22 RX ADMIN — LIDOCAINE HYDROCHLORIDE 40 ML: 20 SOLUTION ORAL; TOPICAL at 17:47

## 2020-04-22 NOTE — DISCHARGE INSTRUCTIONS
Patient Education        Chest Pain: Care Instructions  Your Care Instructions    There are many things that can cause chest pain. Some are not serious and will get better on their own in a few days. But some kinds of chest pain need more testing and treatment. Your doctor may have recommended a follow-up visit in the next 8 to 12 hours. If you are not getting better, you may need more tests or treatment. Even though your doctor has released you, you still need to watch for any problems. The doctor carefully checked you, but sometimes problems can develop later. If you have new symptoms or if your symptoms do not get better, get medical care right away. If you have worse or different chest pain or pressure that lasts more than 5 minutes or you passed out (lost consciousness), call 911 or seek other emergency help right away. A medical visit is only one step in your treatment. Even if you feel better, you still need to do what your doctor recommends, such as going to all suggested follow-up appointments and taking medicines exactly as directed. This will help you recover and help prevent future problems. How can you care for yourself at home? · Rest until you feel better. · Take your medicine exactly as prescribed. Call your doctor if you think you are having a problem with your medicine. · Do not drive after taking a prescription pain medicine. When should you call for help? Call 911 if:    · You passed out (lost consciousness).     · You have severe difficulty breathing.     · You have symptoms of a heart attack. These may include:  ? Chest pain or pressure, or a strange feeling in your chest.  ? Sweating. ? Shortness of breath. ? Nausea or vomiting. ? Pain, pressure, or a strange feeling in your back, neck, jaw, or upper belly or in one or both shoulders or arms. ? Lightheadedness or sudden weakness. ? A fast or irregular heartbeat.   After you call  911, the  may tell you to chew 1 adult-strength or 2 to 4 low-dose aspirin. Wait for an ambulance. Do not try to drive yourself.    Call your doctor today if:    · You have any trouble breathing.     · Your chest pain gets worse.     · You are dizzy or lightheaded, or you feel like you may faint.     · You are not getting better as expected.     · You are having new or different chest pain. Where can you learn more? Go to http://bethany-nia.info/  Enter A120 in the search box to learn more about \"Chest Pain: Care Instructions. \"  Current as of: June 26, 2019Content Version: 12.4  © 6238-1032 [employer]. Care instructions adapted under license by [employer] (which disclaims liability or warranty for this information). If you have questions about a medical condition or this instruction, always ask your healthcare professional. [place] [address] any warranty or liability for your use of this information. Patient Education        Migraine Headache: Care Instructions  Your Care Instructions  Migraines are painful, throbbing headaches that often start on one side of the head. They may cause nausea and vomiting and make you sensitive to light, sound, or smell. Without treatment, migraines can last from 4 hours to a few days. Medicines can help prevent migraines or stop them after they have started. Your doctor can help you find which ones work best for you. Follow-up care is a key part of your treatment and safety. Be sure to make and go to all appointments, and call your doctor if you are having problems. It's also a good idea to know your test results and keep a list of the medicines you take. How can you care for yourself at home? · Do not drive if you have taken a prescription pain medicine. · Rest in a quiet, dark room until your headache is gone. Close your eyes, and try to relax or go to sleep. Don't watch TV or read.   · Put a cold, moist cloth or cold pack on the painful area for 10 to 20 minutes at a time. Put a thin cloth between the cold pack and your skin. · Use a warm, moist towel or a heating pad set on low to relax tight shoulder and neck muscles. · Have someone gently massage your neck and shoulders. · Take your medicines exactly as prescribed. Call your doctor if you think you are having a problem with your medicine. You will get more details on the specific medicines your doctor prescribes. · Be careful not to take pain medicine more often than the instructions allow. You could get worse or more frequent headaches when the medicine wears off. To prevent migraines  · Keep a headache diary so you can figure out what triggers your headaches. Avoiding triggers may help you prevent headaches. Record when each headache began, how long it lasted, and what the pain was like. (Was it throbbing, aching, stabbing, or dull?) Write down any other symptoms you had with the headache, such as nausea, flashing lights or dark spots, or sensitivity to bright light or loud noise. Note if the headache occurred near your period. List anything that might have triggered the headache. Triggers may include certain foods (chocolate, cheese, wine) or odors, smoke, bright light, stress, or lack of sleep. · If your doctor has prescribed medicine for your migraines, take it as directed. You may have medicine that you take only when you get a migraine and medicine that you take all the time to help prevent migraines. ? If your doctor has prescribed medicine for when you get a headache, take it at the first sign of a migraine, unless your doctor has given you other instructions. ? If your doctor has prescribed medicine to prevent migraines, take it exactly as prescribed. Call your doctor if you think you are having a problem with your medicine. · Find healthy ways to deal with stress. Migraines are most common during or right after stressful times.  Take time to relax before and after you do something that has caused a migraine in the past.  · Try to keep your muscles relaxed by keeping good posture. Check your jaw, face, neck, and shoulder muscles for tension. Try to relax them. When you sit at a desk, change positions often. And make sure to stretch for 30 seconds each hour. · Get plenty of sleep and exercise. · Eat meals on a regular schedule. Avoid foods and drinks that often trigger migraines. These include chocolate, alcohol (especially red wine and port), aspartame, monosodium glutamate (MSG), and some additives found in foods (such as hot dogs, cee, cold cuts, aged cheeses, and pickled foods). · Limit caffeine. Don't drink too much coffee, tea, or soda. But don't quit caffeine suddenly. That can also give you migraines. · Do not smoke or allow others to smoke around you. If you need help quitting, talk to your doctor about stop-smoking programs and medicines. These can increase your chances of quitting for good. · If you are taking birth control pills or hormone therapy, talk to your doctor about whether they are triggering your migraines. When should you call for help? Call 911 anytime you think you may need emergency care. For example, call if:    · You have signs of a stroke. These may include:  ? Sudden numbness, paralysis, or weakness in your face, arm, or leg, especially on only one side of your body. ? Sudden vision changes. ? Sudden trouble speaking. ? Sudden confusion or trouble understanding simple statements. ? Sudden problems with walking or balance. ? A sudden, severe headache that is different from past headaches.    Call your doctor now or seek immediate medical care if:    · You have new or worse nausea and vomiting.     · You have a new or higher fever.     · Your headache gets much worse.    Watch closely for changes in your health, and be sure to contact your doctor if:    · You are not getting better after 2 days (48 hours). Where can you learn more?   Go to http://bethany-nia.info/  Enter W462 in the search box to learn more about \"Migraine Headache: Care Instructions. \"  Current as of: November 19, 2019Content Version: 12.4  © 7392-6371 [employer]. Care instructions adapted under license by [employer] (which disclaims liability or warranty for this information). If you have questions about a medical condition or this instruction, always ask your healthcare professional. [address] any warranty or liability for your use of this information.

## 2020-04-22 NOTE — ED NOTES
Emergency Department Nursing Plan of Care       The Nursing Plan of Care is developed from the Nursing assessment and Emergency Department Attending provider initial evaluation. The plan of care may be reviewed in the ED Provider note.     The Plan of Care was developed with the following considerations:   Patient / Family readiness to learn indicated by:verbalized understanding  Persons(s) to be included in education: patient  Barriers to Learning/Limitations:No    [facility]    4/22/2020   5:23 PM

## 2020-04-22 NOTE — PROGRESS NOTES
Date of previous inpatient admission/ ED visit? 4/17/2020    What brought the patient back to ED? complaints of intermittent left sided, sharp chest pain starting 1 week ago becoming constant in nature yesterday. Did patient decline recommended services during last admission/ ED visit (if yes, what)? Patient has not seeing her GYN. Has patient seen a provider since their last inpatient admission/ED visit (if yes, when)? No     PCP: First and Last name: Dr. Jensen Sapp. Name of Practice: [facility]. [address]  [phone]     Are you a current patient: Yes/No:  Yes    Approximate date of last visit: unknown    CM Interventions:  From previous inpatient admission/ED visit: patient was to follow-up with PCP and GYN last visit. From current inpatient admission/ED visit: patient will need to follow-up with PCP.     [address] [place] [address]  [phone]

## 2020-04-22 NOTE — ED NOTES
Discharge instructions were given to the patient by Geoffrey Sanders.     The patient left the Emergency Department ambulatory, alert and oriented and in no acute distress with 2 prescriptions. The patient was encouraged to call or return to the ED for worsening issues or problems and was encouraged to schedule a follow up appointment for continuing care. The patient verbalized understanding of discharge instructions and prescriptions, all questions were answered. The patient has no further concerns at this time.

## 2020-04-22 NOTE — ED PROVIDER NOTES
EMERGENCY DEPARTMENT HISTORY AND PHYSICAL EXAM      Date: 4/22/2020  Patient Name: Ramirez Zelaya    History of Presenting Illness     Chief Complaint   Patient presents with    Chest Pain     History Provided By: Patient    HPI: Ramirez Zelaya, 36 y.o. female with past medical history significant for asthma, depression, migraines, and ectopic pregnancies who presents via private vehicle to the ED with cc of substernal chest pain for the past week. Patient states her symptoms became more severe last night. Her pain is worse with laying flat and better with sitting upright. She also reports her symptoms are worse with taking a deep breath. She also reports a headache which she states is normal for her. She has a history of migraine headaches. She was seen approximately 1 week ago for strep throat and discharged with a prescription for amoxicillin which she has been compliant with. Her throat pain has completely resolved. She denies taking anything for the chest pain or headaches prior to arrival.    PMHx: Asthma, depression, migraines, ectopic pregnancy  Social Hx: Former smoker, occasional alcohol use, denies illegal drug use    PCP: Ely Coffey MD    There are no other complaints, changes, or physical findings at this time. No current facility-administered medications on file prior to encounter. Current Outpatient Medications on File Prior to Encounter   Medication Sig Dispense Refill    amoxicillin (AMOXIL) 875 mg tablet Take 1 Tab by mouth two (2) times a day for 10 days. 20 Tab 0    acetaminophen (Tylenol Extra Strength) 500 mg tablet Take 2 Tabs by mouth every six (6) hours as needed for Pain. 20 Tab 0    albuterol (PROVENTIL HFA, VENTOLIN HFA, PROAIR HFA) 90 mcg/actuation inhaler Take 2 Puffs by inhalation every four (4) hours as needed for Wheezing.  1 Inhaler 0     Past History     Past Medical History:  Past Medical History:   Diagnosis Date    Asthma     Delivery normal     Depression     Ectopic pregnancy     Miscarriage     Neurological disorder     migraines    Other ill-defined conditions(799.89)     ectopic pregnancy    Psychiatric disorder     anger      Past Surgical History:  Past Surgical History:   Procedure Laterality Date    ABDOMEN SURGERY PROC UNLISTED      HX CHOLECYSTECTOMY      HX GYN      right oophetrecomy    HX HERNIA REPAIR  14    Open repair right inguinal hernia with mesh    HX ORTHOPAEDIC      Right arm/leg     Family History:  Family History   Problem Relation Age of Onset    Cancer Mother         breast    Hypertension Mother     Thyroid Disease Mother     Sickle Cell Trait Father     Asthma Sister     Asthma Brother     Asthma Sister     Asthma Sister     Asthma Sister     Asthma Sister     Asthma Sister     Asthma Brother     Asthma Brother     Asthma Brother     Asthma Brother     Sickle Cell Anemia Brother     Asthma Brother     Diabetes Paternal Grandmother      Social History:  Social History     Tobacco Use    Smoking status: Current Some Day Smoker     Packs/day: 0.00     Types: Cigarettes     Last attempt to quit: 2016     Years since quittin.0    Smokeless tobacco: Never Used    Tobacco comment: social smoker   Substance Use Topics    Alcohol use: Yes     Comment: occasionally    Drug use: No     Allergies: Allergies   Allergen Reactions    Ibuprofen Hives and Itching     Review of Systems   Review of Systems   Constitutional: Negative for chills and fever. HENT: Negative for congestion, rhinorrhea, sneezing and sore throat. Eyes: Negative for redness and visual disturbance. Respiratory: Negative for shortness of breath. Cardiovascular: Positive for chest pain. Negative for leg swelling. Gastrointestinal: Negative for abdominal pain, nausea and vomiting. Genitourinary: Negative for difficulty urinating and frequency. Musculoskeletal: Negative for back pain, myalgias and neck stiffness. Skin: Negative for rash. Neurological: Positive for headaches. Negative for dizziness, syncope and weakness. Hematological: Negative for adenopathy. All other systems reviewed and are negative. Physical Exam   Physical Exam  Vitals signs and nursing note reviewed. Constitutional:       Appearance: Normal appearance. She is well-developed and overweight. HENT:      Head: Normocephalic and atraumatic. Eyes:      Conjunctiva/sclera: Conjunctivae normal.   Neck:      Musculoskeletal: Full passive range of motion without pain, normal range of motion and neck supple. Cardiovascular:      Rate and Rhythm: Normal rate and regular rhythm. Pulses: Normal pulses. Heart sounds: Normal heart sounds, S1 normal and S2 normal. No murmur. Pulmonary:      Effort: Pulmonary effort is normal. No respiratory distress. Breath sounds: Normal breath sounds. No wheezing. Abdominal:      General: Bowel sounds are normal. There is no distension. Palpations: Abdomen is soft. Tenderness: There is no abdominal tenderness. There is no rebound. Musculoskeletal: Normal range of motion. Skin:     General: Skin is warm and dry. Findings: No rash. Neurological:      Mental Status: She is alert and oriented to person, place, and time. Psychiatric:         Speech: Speech normal.         Behavior: Behavior normal.         Thought Content:  Thought content normal.         Judgment: Judgment normal.       Diagnostic Study Results   Labs -     Recent Results (from the past 12 hour(s))   EKG, 12 LEAD, INITIAL    Collection Time: 04/22/20  5:37 PM   Result Value Ref Range    Ventricular Rate 87 BPM    Atrial Rate 87 BPM    P-R Interval 144 ms    QRS Duration 86 ms    Q-T Interval 370 ms    QTC Calculation (Bezet) 445 ms    Calculated P Axis 48 degrees    Calculated R Axis -11 degrees    Calculated T Axis 55 degrees    Diagnosis       Normal sinus rhythm  Possible Left atrial enlargement  Borderline ECG         Radiologic Studies -   No orders to display     No results found. Medical Decision Making   I am the first provider for this patient. I reviewed the vital signs, available nursing notes, past medical history, past surgical history, family history and social history. Vital Signs-Reviewed the patient's vital signs. Patient Vitals for the past 24 hrs:   Temp Pulse Resp BP SpO2   04/22/20 1716 98.3 °F (36.8 °C) 98 16 152/82 100 %     Pulse Oximetry Analysis - 100% on RA    ED EKG interpretation: 17:37  Rhythm: normal sinus rhythm; and regular . Rate (approx.): 87; Axis: normal; P wave: normal; QRS interval: normal ; ST/T wave: normal; Other findings: normal. This EKG was interpreted by Ashia Boone MD,ED Provider. Records Reviewed: Nursing Notes and Old Medical Records    Provider Notes (Medical Decision Making):   52-year-old female presents with substernal chest pain intermittent for the past week that is progressively gotten worse since last night. Her pain has been present since last night around 7 PM.  Differential includes GERD, hiatal hernia, low suspicion for ACS, PE, pneumothorax, asthma, costochondritis, or pleurisy. Will obtain a single EKG given the duration of her chest pain and treat with a GI cocktail. ED Course:   Initial assessment performed. The patients presenting problems have been discussed, and they are in agreement with the care plan formulated and outlined with them. I have encouraged them to ask questions as they arise throughout their visit. Progress Note  6:42 PM  I have re-evaluated pt and she states that her headache is improving and her chest pain is getting better after the GI cocktail. I offered her further work-up including labs and a chest x-ray but she declines at this time. She will start the medications at home and if her symptoms do not improve, return for reevaluation and further work-up.     Progress Note:   Updated pt on all returned results and findings. Discussed the importance of proper follow up as referred below along with return precautions. Pt in agreement with the care plan and expresses agreement with and understanding of all items discussed. Disposition:  Discharge Note:  The pt is ready for discharge. The pt's signs, symptoms, diagnosis, and discharge instructions have been discussed and pt has conveyed their understanding. The pt is to follow up as recommended or return to ER should their symptoms worsen. Plan has been discussed and pt is in agreement. PLAN:  1. Current Discharge Medication List      START taking these medications    Details   omeprazole (PRILOSEC) 20 mg capsule Take 1 Cap by mouth daily for 30 days. Qty: 30 Cap, Refills: 0      butalbital-acetaminophen-caffeine (FIORICET, ESGIC) -40 mg per tablet Take 1 Tab by mouth every six (6) hours as needed for Headache. Indications: a migraine headache  Qty: 20 Tab, Refills: 0           2. Follow-up Information     Follow up With Specialties Details Why Contact Info    Charmayne Frizzle, MD Family Practice On 4/27/2020 Please follow-up with pcp. please call office and schedule appointment by monday. [address] [place] [address] - [facility] Emergency Medicine  As needed, If symptoms worsen [address] [facility]  [phone]        Return to ED if worse     Diagnosis     Clinical Impression:   1. Atypical chest pain    2. Migraine without status migrainosus, not intractable, unspecified migraine type            Please note that this dictation was completed with Dragon, computer voice recognition software. Quite often unanticipated grammatical, syntax, homophones, and other interpretive errors are inadvertently transcribed by the computer software. Please disregard these errors. Additionally, please excuse any errors that have escaped final proofreading.

## 2020-04-22 NOTE — ED NOTES
Pt arrives in the ED with complaints of intermittent left sided, sharp chest pain starting 1 week ago becoming constant in nature yesterday. Pt also reports migraine headache x 1 week with history of migraines. Reports usually takes Fioricet for pain however ran out of this medication. Pt was seen here and diagnosed with strep throat on 4/17 and discharged with amoxicillin which she has bee taking as prescribed.

## 2020-04-23 ENCOUNTER — PATIENT OUTREACH (OUTPATIENT)
Dept: CASE MANAGEMENT | Age: 35
End: 2020-04-23

## 2020-04-24 LAB
ATRIAL RATE: 87 BPM
CALCULATED P AXIS, ECG09: 48 DEGREES
CALCULATED R AXIS, ECG10: -11 DEGREES
CALCULATED T AXIS, ECG11: 55 DEGREES
DIAGNOSIS, 93000: NORMAL
P-R INTERVAL, ECG05: 144 MS
Q-T INTERVAL, ECG07: 370 MS
QRS DURATION, ECG06: 86 MS
QTC CALCULATION (BEZET), ECG08: 445 MS
VENTRICULAR RATE, ECG03: 87 BPM

## 2020-04-29 ENCOUNTER — PATIENT OUTREACH (OUTPATIENT)
Dept: CASE MANAGEMENT | Age: 35
End: 2020-04-29

## 2020-04-29 NOTE — PROGRESS NOTES
Called pt to follow up on ED visit to [facility] on 4/22/2020. LVM stating my name, CTN calling on behalf of [facility], date and time of call and my return telephone number.

## 2020-05-01 ENCOUNTER — TELEPHONE (OUTPATIENT)
Dept: CASE MANAGEMENT | Age: 35
End: 2020-05-01

## 2020-05-06 ENCOUNTER — PATIENT OUTREACH (OUTPATIENT)
Dept: CASE MANAGEMENT | Age: 35
End: 2020-05-06

## 2020-05-06 NOTE — PROGRESS NOTES
Patient resolved from Transition of Care episode on 5/6/2020. ACM/CTN was unsuccessful at contacting this patient today. Patient/family was provided the following resources and education related to COVID-19 during the initial call:                         Signs, symptoms and red flags related to COVID-19            CDC exposure and quarantine guidelines            [facility] exposure contact - [phone]            Contact for their local Department of Health                 Patient has not had any additional ED or hospital visits. No further outreach scheduled with this CTN/ACM. Episode of Care resolved. Patient has this CTN/ACM contact information if future needs arise.

## 2020-06-12 ENCOUNTER — HOSPITAL ENCOUNTER (EMERGENCY)
Age: 35
Discharge: HOME | End: 2020-06-12
Attending: EMERGENCY MEDICINE
Payer: MEDICAID

## 2020-06-12 VITALS
DIASTOLIC BLOOD PRESSURE: 77 MMHG | WEIGHT: 187 LBS | TEMPERATURE: 98.4 F | HEIGHT: 68 IN | SYSTOLIC BLOOD PRESSURE: 143 MMHG | OXYGEN SATURATION: 97 % | RESPIRATION RATE: 17 BRPM | HEART RATE: 73 BPM | BODY MASS INDEX: 28.34 KG/M2

## 2020-06-12 DIAGNOSIS — S33.5XXA LUMBAR SPRAIN, INITIAL ENCOUNTER: Primary | ICD-10-CM

## 2020-06-12 LAB
APPEARANCE UR: ABNORMAL
BACTERIA URNS QL MICRO: NEGATIVE /HPF
BILIRUB UR QL: NEGATIVE
COLOR UR: ABNORMAL
EPITH CASTS URNS QL MICRO: ABNORMAL /LPF
GLUCOSE UR STRIP.AUTO-MCNC: NEGATIVE MG/DL
HCG UR QL: NEGATIVE
HGB UR QL STRIP: NEGATIVE
KETONES UR QL STRIP.AUTO: NEGATIVE MG/DL
LEUKOCYTE ESTERASE UR QL STRIP.AUTO: NEGATIVE
NITRITE UR QL STRIP.AUTO: NEGATIVE
PH UR STRIP: 6 [PH] (ref 5–8)
PROT UR STRIP-MCNC: NEGATIVE MG/DL
RBC #/AREA URNS HPF: ABNORMAL /HPF (ref 0–5)
SP GR UR REFRACTOMETRY: 1.02 (ref 1–1.03)
UA: UC IF INDICATED,UAUC: ABNORMAL
UROBILINOGEN UR QL STRIP.AUTO: 1 EU/DL (ref 0.2–1)
WBC URNS QL MICRO: ABNORMAL /HPF (ref 0–4)

## 2020-06-12 PROCEDURE — 81001 URINALYSIS AUTO W/SCOPE: CPT

## 2020-06-12 PROCEDURE — 81025 URINE PREGNANCY TEST: CPT

## 2020-06-12 PROCEDURE — 74011250637 HC RX REV CODE- 250/637: Performed by: NURSE PRACTITIONER

## 2020-06-12 PROCEDURE — 99283 EMERGENCY DEPT VISIT LOW MDM: CPT

## 2020-06-12 RX ORDER — METHOCARBAMOL 500 MG/1
500 TABLET, FILM COATED ORAL 4 TIMES DAILY
Qty: 30 TAB | Refills: 0 | Status: SHIPPED | OUTPATIENT
Start: 2020-06-12 | End: 2020-07-20

## 2020-06-12 RX ORDER — ONDANSETRON 4 MG/1
4 TABLET, ORALLY DISINTEGRATING ORAL
Status: COMPLETED | OUTPATIENT
Start: 2020-06-12 | End: 2020-06-12

## 2020-06-12 RX ORDER — LIDOCAINE 4 G/100G
PATCH TOPICAL
Qty: 6 PATCH | Refills: 0 | OUTPATIENT
Start: 2020-06-12 | End: 2020-07-20

## 2020-06-12 RX ADMIN — ONDANSETRON 4 MG: 4 TABLET, ORALLY DISINTEGRATING ORAL at 17:32

## 2020-06-12 NOTE — ED NOTES
Patient here with c/o lower back pain and vomiting. Patient reports symptoms x2 days. Patient states LMP about a month ago. Patient denies fevers. Patient reports hx of UTIs. Emergency Department Nursing Plan of Care       The Nursing Plan of Care is developed from the Nursing assessment and Emergency Department Attending provider initial evaluation. The plan of care may be reviewed in the ED Provider note.     The Plan of Care was developed with the following considerations:   Patient / Family readiness to learn indicated by:verbalized understanding  Persons(s) to be included in education: patient  Barriers to Learning/Limitations:No    Signed     Linwood Jones RN    6/12/2020   5:19 PM

## 2020-06-12 NOTE — ED PROVIDER NOTES
EMERGENCY DEPARTMENT HISTORY AND PHYSICAL EXAM    Date: 6/12/2020  Patient Name: Linda Blancas    History of Presenting Illness     Chief Complaint   Patient presents with    Back Pain     lower x2 days, denies injury/trauma    Vomiting         History Provided By: Patient    Chief Complaint: back pain  Duration: 2 Days  Timing:  Acute  Location: lower back  Quality: Aching  Severity: 8 out of 10  Modifying Factors: moving turning worsens pain  Associated Symptoms: denies any other associated signs or symptoms      HPI: Linda Blancas is a 36 y.o. female with a PMH of asthma who presents with   Lower back pain acute onset 2 days ago. Patient states last night she vomited 1 time. Patient is taking fluids well. Patient states she just has felt nauseous when she eats. Patient states that she is not sure she may be pregnant. Patient also states that the last time she had similar pain she had a urinary tract infection. Patient denies fever hematuria vaginal discharge. Patient has not taken any medication for the pain. PCP: Bárbara Hall MD    Current Outpatient Medications   Medication Sig Dispense Refill    lidocaine (Lidocaine Pain Relief) 4 % patch Apply one patch every 12 hours 6 Patch 0    methocarbamoL (Robaxin) 500 mg tablet Take 1 Tab by mouth four (4) times daily. 30 Tab 0    butalbital-acetaminophen-caffeine (FIORICET, ESGIC) -40 mg per tablet Take 1 Tab by mouth every six (6) hours as needed for Headache. Indications: a migraine headache 20 Tab 0    acetaminophen (Tylenol Extra Strength) 500 mg tablet Take 2 Tabs by mouth every six (6) hours as needed for Pain. 20 Tab 0    albuterol (PROVENTIL HFA, VENTOLIN HFA, PROAIR HFA) 90 mcg/actuation inhaler Take 2 Puffs by inhalation every four (4) hours as needed for Wheezing.  1 Inhaler 0       Past History     Past Medical History:  Past Medical History:   Diagnosis Date    Asthma     Delivery normal     Depression     Ectopic pregnancy     Miscarriage     Neurological disorder     migraines    Other ill-defined conditions(799.89)     ectopic pregnancy    Psychiatric disorder     anger        Past Surgical History:  Past Surgical History:   Procedure Laterality Date    ABDOMEN SURGERY PROC UNLISTED      HX CHOLECYSTECTOMY      HX GYN      right oophetrecomy    HX HERNIA REPAIR  14    Open repair right inguinal hernia with mesh    HX ORTHOPAEDIC      Right arm/leg       Family History:  Family History   Problem Relation Age of Onset    Cancer Mother         breast    Hypertension Mother     Thyroid Disease Mother     Sickle Cell Trait Father     Asthma Sister     Asthma Brother     Asthma Sister     Asthma Sister     Asthma Sister     Asthma Sister     Asthma Sister     Asthma Brother     Asthma Brother     Asthma Brother     Asthma Brother     Sickle Cell Anemia Brother     Asthma Brother     Diabetes Paternal Grandmother        Social History:  Social History     Tobacco Use    Smoking status: Current Some Day Smoker     Packs/day: 0.00     Types: Cigarettes     Last attempt to quit: 2016     Years since quittin.2    Smokeless tobacco: Never Used    Tobacco comment: social smoker   Substance Use Topics    Alcohol use: Yes     Comment: occasionally    Drug use: No       Allergies: Allergies   Allergen Reactions    Ibuprofen Hives and Itching         Review of Systems   Review of Systems   Constitutional: Negative for fatigue and fever. Respiratory: Negative for shortness of breath and wheezing. Cardiovascular: Negative for chest pain and palpitations. Gastrointestinal: Negative for abdominal pain. Musculoskeletal: Positive for back pain. Negative for arthralgias, myalgias, neck pain and neck stiffness. Skin: Negative for pallor and rash. Neurological: Negative for dizziness, tremors, weakness and headaches. Hematological: Negative for adenopathy.    All other systems reviewed and are negative. Physical Exam     Vitals:    06/12/20 1642   BP: 143/77   Pulse: 73   Resp: 17   Temp: 98.4 °F (36.9 °C)   SpO2: 97%   Weight: 84.8 kg (187 lb)   Height: 5' 8\" (1.727 m)     Physical Exam  Vitals signs and nursing note reviewed. Constitutional:       General: She is not in acute distress. Appearance: She is well-developed. HENT:      Head: Normocephalic and atraumatic. Right Ear: External ear normal.      Left Ear: External ear normal.      Nose: Nose normal.   Eyes:      Conjunctiva/sclera: Conjunctivae normal.   Neck:      Musculoskeletal: Normal range of motion and neck supple. Cardiovascular:      Rate and Rhythm: Normal rate and regular rhythm. Heart sounds: Normal heart sounds. Pulmonary:      Effort: Pulmonary effort is normal. No respiratory distress. Breath sounds: Normal breath sounds. No wheezing. Abdominal:      General: Bowel sounds are normal.      Palpations: Abdomen is soft. Tenderness: There is no abdominal tenderness. Musculoskeletal: Normal range of motion. Comments: Bilateral LS spine paravertebral  Muscle tenderness . No midline tenderness DNV intact Negative SLR. Lymphadenopathy:      Cervical: No cervical adenopathy. Skin:     General: Skin is warm and dry. Capillary Refill: Capillary refill takes less than 2 seconds. Findings: No rash. Neurological:      Mental Status: She is alert and oriented to person, place, and time. Cranial Nerves: No cranial nerve deficit. Coordination: Coordination normal.   Psychiatric:         Behavior: Behavior normal.         Thought Content:  Thought content normal.         Judgment: Judgment normal.           Diagnostic Study Results     Labs -     Recent Results (from the past 12 hour(s))   HCG URINE, QL. - POC    Collection Time: 06/12/20  5:15 PM   Result Value Ref Range    Pregnancy test,urine (POC) Negative NEG     URINALYSIS W/ REFLEX CULTURE    Collection Time: 06/12/20 5:33 PM   Result Value Ref Range    Color YELLOW/STRAW      Appearance CLOUDY (A) CLEAR      Specific gravity 1.020 1.003 - 1.030      pH (UA) 6.0 5.0 - 8.0      Protein Negative NEG mg/dL    Glucose Negative NEG mg/dL    Ketone Negative NEG mg/dL    Bilirubin Negative NEG      Blood Negative NEG      Urobilinogen 1.0 0.2 - 1.0 EU/dL    Nitrites Negative NEG      Leukocyte Esterase Negative NEG      WBC 0-4 0 - 4 /hpf    RBC 0-5 0 - 5 /hpf    Epithelial cells FEW FEW /lpf    Bacteria Negative NEG /hpf    UA:UC IF INDICATED CULTURE NOT INDICATED BY UA RESULT CNI         Radiologic Studies -   No orders to display     CT Results  (Last 48 hours)    None        CXR Results  (Last 48 hours)    None            Medical Decision Making   I am the first provider for this patient. I reviewed the vital signs, available nursing notes, past medical history, past surgical history, family history and social history. Vital Signs-Reviewed the patient's vital signs. Records Reviewed: Nursing Notes            Disposition:  home    DISCHARGE NOTE:           Care plan outlined and precautions discussed. Patient has no new complaints, changes, or physical findings. Results of test were reviewed with the patient. All medications were reviewed with the patient; will d/c home with lidocaine patch. All of pt's questions and concerns were addressed. Patient was instructed and agrees to follow up with PCP, as well as to return to the ED upon further deterioration. Patient is ready to go home.     Follow-up Information     Follow up With Specialties Details Why Contact Info    Jessi Obrien MD Family Practice In 3 days If symptoms worsen [address]  [phone]            Current Discharge Medication List      START taking these medications    Details   lidocaine (Lidocaine Pain Relief) 4 % patch Apply one patch every 12 hours  Qty: 6 Patch, Refills: 0      methocarbamoL (Robaxin) 500 mg tablet Take 1 Tab by mouth four (4) times daily. Qty: 30 Tab, Refills: 0             Provider Notes (Medical Decision Making):   DDX UTI pyelonephritis kidney stone lumbar sprain lumbar strain  Procedures:  Procedures    Please note that this dictation was completed with Dragon, computer voice recognition software. Quite often unanticipated grammatical, syntax, homophones, and other interpretive errors are inadvertently transcribed by the computer software. Please disregard these errors. Additionally, please excuse any errors that have escaped final proofreading. Diagnosis     Clinical Impression:   1.  Lumbar sprain, initial encounter

## 2020-06-12 NOTE — DISCHARGE INSTRUCTIONS

## 2020-06-12 NOTE — ED TRIAGE NOTES
Pt presents to ED with c/o lower back pain, atraumatic. Pt reports feeling nauseous and had 2 vomiting episodes yesterday. Pt reports taking OTC pain medication yesterday, with no relief.

## 2020-06-24 ENCOUNTER — HOSPITAL ENCOUNTER (EMERGENCY)
Age: 35
Discharge: HOME | End: 2020-06-24
Attending: EMERGENCY MEDICINE
Payer: MEDICAID

## 2020-06-24 ENCOUNTER — APPOINTMENT (OUTPATIENT)
Dept: GENERAL RADIOLOGY | Age: 35
End: 2020-06-24
Attending: PHYSICIAN ASSISTANT
Payer: MEDICAID

## 2020-06-24 VITALS
BODY MASS INDEX: 27.43 KG/M2 | SYSTOLIC BLOOD PRESSURE: 138 MMHG | RESPIRATION RATE: 18 BRPM | DIASTOLIC BLOOD PRESSURE: 88 MMHG | OXYGEN SATURATION: 98 % | TEMPERATURE: 98.8 F | HEIGHT: 68 IN | HEART RATE: 89 BPM | WEIGHT: 181 LBS

## 2020-06-24 DIAGNOSIS — M51.36 DEGENERATIVE DISC DISEASE, LUMBAR: Primary | ICD-10-CM

## 2020-06-24 DIAGNOSIS — E87.6 HYPOKALEMIA: ICD-10-CM

## 2020-06-24 LAB
ALBUMIN SERPL-MCNC: 4 G/DL (ref 3.5–5)
ALBUMIN/GLOB SERPL: 1.1 {RATIO} (ref 1.1–2.2)
ALP SERPL-CCNC: 62 U/L (ref 45–117)
ALT SERPL-CCNC: 34 U/L (ref 12–78)
AMORPH CRY URNS QL MICRO: ABNORMAL
ANION GAP SERPL CALC-SCNC: 7 MMOL/L (ref 5–15)
APPEARANCE UR: ABNORMAL
AST SERPL-CCNC: 24 U/L (ref 15–37)
BACTERIA URNS QL MICRO: NEGATIVE /HPF
BASOPHILS # BLD: 0.1 K/UL (ref 0–0.1)
BASOPHILS NFR BLD: 1 % (ref 0–1)
BILIRUB SERPL-MCNC: 0.5 MG/DL (ref 0.2–1)
BILIRUB UR QL CFM: NEGATIVE
BUN SERPL-MCNC: 9 MG/DL (ref 6–20)
BUN/CREAT SERPL: 10 (ref 12–20)
CALCIUM SERPL-MCNC: 8.5 MG/DL (ref 8.5–10.1)
CHLORIDE SERPL-SCNC: 103 MMOL/L (ref 97–108)
CO2 SERPL-SCNC: 28 MMOL/L (ref 21–32)
COLOR UR: ABNORMAL
CREAT SERPL-MCNC: 0.88 MG/DL (ref 0.55–1.02)
DIFFERENTIAL METHOD BLD: NORMAL
EOSINOPHIL # BLD: 0.1 K/UL (ref 0–0.4)
EOSINOPHIL NFR BLD: 2 % (ref 0–7)
EPITH CASTS URNS QL MICRO: ABNORMAL /LPF
ERYTHROCYTE [DISTWIDTH] IN BLOOD BY AUTOMATED COUNT: 13.8 % (ref 11.5–14.5)
GLOBULIN SER CALC-MCNC: 3.7 G/DL (ref 2–4)
GLUCOSE SERPL-MCNC: 113 MG/DL (ref 65–100)
GLUCOSE UR STRIP.AUTO-MCNC: NEGATIVE MG/DL
HCG UR QL: NEGATIVE
HCT VFR BLD AUTO: 36.9 % (ref 35–47)
HGB BLD-MCNC: 11.9 G/DL (ref 11.5–16)
HGB UR QL STRIP: ABNORMAL
IMM GRANULOCYTES # BLD AUTO: 0 K/UL (ref 0–0.04)
IMM GRANULOCYTES NFR BLD AUTO: 0 % (ref 0–0.5)
KETONES UR QL STRIP.AUTO: 15 MG/DL
LEUKOCYTE ESTERASE UR QL STRIP.AUTO: ABNORMAL
LYMPHOCYTES # BLD: 2.4 K/UL (ref 0.8–3.5)
LYMPHOCYTES NFR BLD: 32 % (ref 12–49)
MCH RBC QN AUTO: 27.2 PG (ref 26–34)
MCHC RBC AUTO-ENTMCNC: 32.2 G/DL (ref 30–36.5)
MCV RBC AUTO: 84.2 FL (ref 80–99)
MONOCYTES # BLD: 0.7 K/UL (ref 0–1)
MONOCYTES NFR BLD: 9 % (ref 5–13)
MUCOUS THREADS URNS QL MICRO: ABNORMAL /LPF
NEUTS SEG # BLD: 4.3 K/UL (ref 1.8–8)
NEUTS SEG NFR BLD: 56 % (ref 32–75)
NITRITE UR QL STRIP.AUTO: NEGATIVE
NRBC # BLD: 0 K/UL (ref 0–0.01)
NRBC BLD-RTO: 0 PER 100 WBC
PH UR STRIP: 5.5 [PH] (ref 5–8)
PLATELET # BLD AUTO: 260 K/UL (ref 150–400)
PMV BLD AUTO: 10.2 FL (ref 8.9–12.9)
POTASSIUM SERPL-SCNC: 3 MMOL/L (ref 3.5–5.1)
PROT SERPL-MCNC: 7.7 G/DL (ref 6.4–8.2)
PROT UR STRIP-MCNC: 30 MG/DL
RBC # BLD AUTO: 4.38 M/UL (ref 3.8–5.2)
RBC #/AREA URNS HPF: ABNORMAL /HPF (ref 0–5)
SODIUM SERPL-SCNC: 138 MMOL/L (ref 136–145)
SP GR UR REFRACTOMETRY: 1.03 (ref 1–1.03)
UA: UC IF INDICATED,UAUC: ABNORMAL
UROBILINOGEN UR QL STRIP.AUTO: 1 EU/DL (ref 0.2–1)
WBC # BLD AUTO: 7.6 K/UL (ref 3.6–11)
WBC URNS QL MICRO: ABNORMAL /HPF (ref 0–4)

## 2020-06-24 PROCEDURE — 99284 EMERGENCY DEPT VISIT MOD MDM: CPT

## 2020-06-24 PROCEDURE — 85025 COMPLETE CBC W/AUTO DIFF WBC: CPT

## 2020-06-24 PROCEDURE — 81001 URINALYSIS AUTO W/SCOPE: CPT

## 2020-06-24 PROCEDURE — 81025 URINE PREGNANCY TEST: CPT

## 2020-06-24 PROCEDURE — 36415 COLL VENOUS BLD VENIPUNCTURE: CPT

## 2020-06-24 PROCEDURE — 72100 X-RAY EXAM L-S SPINE 2/3 VWS: CPT

## 2020-06-24 PROCEDURE — 80053 COMPREHEN METABOLIC PANEL: CPT

## 2020-06-24 PROCEDURE — 74011250637 HC RX REV CODE- 250/637: Performed by: PHYSICIAN ASSISTANT

## 2020-06-24 RX ORDER — PREDNISONE 5 MG/1
TABLET ORAL
Qty: 21 TAB | Refills: 0 | OUTPATIENT
Start: 2020-06-24 | End: 2020-07-20

## 2020-06-24 RX ORDER — POTASSIUM CHLORIDE 750 MG/1
40 TABLET, FILM COATED, EXTENDED RELEASE ORAL
Status: COMPLETED | OUTPATIENT
Start: 2020-06-24 | End: 2020-06-24

## 2020-06-24 RX ADMIN — POTASSIUM CHLORIDE 40 MEQ: 750 TABLET, EXTENDED RELEASE ORAL at 17:26

## 2020-06-24 NOTE — ED NOTES
Discharge instructions were given to the patient by Buck Rojo RN. The patient left the Emergency Department ambulatory, alert and oriented and in no acute distress with 1 electronic prescriptions. The patient was encouraged to call or return to the ED for worsening issues or problems and was encouraged to schedule a follow up appointment for continuing care. The patient verbalized understanding of discharge instructions and prescriptions, all questions were answered. The patient has no further concerns at this time.

## 2020-06-24 NOTE — ED NOTES
Pt presents to ED ambulatory complaining of lower back pain and lower abdominal/pelvis pain x approx. 2 weeks. Pt reports she was seen for lumbar spine pain on 6/12 and prescribed robaxin and lidocaine patches. Pt reports her insurance wouldn't pay for the patches so she was unable to fill that px but she has been taking the robaxin as prescribed without relief. Pt denies recent injury. Pt reports she gets this type of abdominal/pelvic pain when she has a UTI. Pt is alert and oriented x 4, RR even and unlabored, skin is warm and dry. Assessment completed and pt updated on plan of care. Call bell in reach. Emergency Department Nursing Plan of Care       The Nursing Plan of Care is developed from the Nursing assessment and Emergency Department Attending provider initial evaluation. The plan of care may be reviewed in the ED Provider note.     The Plan of Care was developed with the following considerations:   Patient / Family readiness to learn indicated by:verbalized understanding  Persons(s) to be included in education: patient  Barriers to Learning/Limitations:No    Signed     Kaz Barnes    6/24/2020   4:52 PM

## 2020-06-24 NOTE — DISCHARGE INSTRUCTIONS
Patient Education        Learning About Degenerative Disc Disease  What is degenerative disc disease? Degenerative disc disease is not really a disease. It's a term used to describe the normal changes in your spinal discs as you age. Spinal discs are small, spongy discs that separate the bones (vertebrae) that make up the spine. The discs act as shock absorbers for the spine, so it can flex, bend, and twist.  Degenerative disc disease can take place in one or more places along the spine. It most often occurs in the discs in the lower back and the neck. What causes it? As we age, our spinal discs break down, or degenerate. This breakdown causes the symptoms of degenerative disc disease in some people. When the discs break down, they can lose fluid and dry out, and their outer layers can have tiny cracks or tears. This leads to less padding and less space between the bones in the spine. The body reacts to this by making bony growths on the spine called bone spurs. These spurs can press on the spinal nerve roots or spinal cord. This can cause pain and can affect how well the nerves work. What are the symptoms? Many people with degenerative disc disease have no pain. But others have severe pain or other symptoms that limit their activities. Some of the most common symptoms are:  · Pain in the back or neck. Where the pain occurs depends on which discs are affected. · Pain that gets worse when you move, such as bending over, reaching up, or twisting. · Pain that may occur in the rear end (buttocks), arm, or leg if a nerve is pinched. · Numbness or tingling in your arm or leg. How is it diagnosed? A doctor can often diagnose degenerative disc disease while doing a physical exam. If your exam shows no signs of a serious condition, imaging tests (such as an X-ray) aren't likely to help your doctor find the cause of your symptoms.   Sometimes degenerative disc disease is found when an X-ray is taken for another reason, such as an injury or other health problem. But even if the doctor finds degenerative disc disease, that doesn't always mean that you will have symptoms. How is it treated? There are several things you can do at home to manage pain from this problem. · To relieve pain, use ice or heat (whichever feels better) on the affected area. ¨ Put ice or a cold pack on the area for 10 to 20 minutes at a time. Put a thin cloth between the ice and your skin. ¨ Put a warm water bottle, a heating pad set on low, or a warm cloth on your back. Put a thin cloth between the heating pad and your skin. Do not go to sleep with a heating pad on your skin. · Ask your doctor if you can take acetaminophen (such as Tylenol) or nonsteroidal anti-inflammatory drugs, such as ibuprofen or naproxen. Your doctor can prescribe stronger medicines if needed. Be safe with medicines. Read and follow all instructions on the label. · Get some exercise every day. Exercise is one of the best ways to help your back feel better and stay better. It's best to start each exercise slowly. You may notice a little soreness, and that's okay. But if an exercise makes your pain worse, stop doing it. Here are things you can try:  ¨ Walking. It's the simplest and maybe the best activity for your back. It gets your blood moving and helps your muscles stay strong. ¨ Exercises that gently stretch and strengthen your stomach, back, and leg muscles. The stronger those muscles are, the better they're able to protect your back. If you have constant or severe pain in your back or spine, you may need other treatments, such as physical therapy. In some cases, your doctor may suggest surgery. Follow-up care is a key part of your treatment and safety. Be sure to make and go to all appointments, and call your doctor if you are having problems. It's also a good idea to know your test results and keep a list of the medicines you take. Where can you learn more?   Go to http://bethany-nia.info/. Enter T261 in the search box to learn more about \"Learning About Degenerative Disc Disease. \"  Current as of: March 21, 2017  Content Version: 11.5  © 1657-0830 [employer] Care instructions adapted under license by [employer] (which disclaims liability or warranty for this information). If you have questions about a medical condition or this instruction, always ask your healthcare professional. [address] any warranty or liability for your use of this information. Patient Education        Hypokalemia: Care Instructions  Your Care Instructions     Hypokalemia (say \"ck-tv-[id number]-OTTO-janae-uh\") is a low level of potassium. The heart, muscles, kidneys, and nervous system all need potassium to work well. This problem has many different causes. Kidney problems, diet, and medicines like diuretics and laxatives can cause it. So can vomiting or diarrhea. In some cases, cancer is the cause. Your doctor may do tests to find the cause of your low potassium levels. You may need medicines to bring your potassium levels back to normal. You may also need regular blood tests to check your potassium. If you have very low potassium, you may need intravenous (IV) medicines. You also may need tests to check the electrical activity of your heart. Heart problems caused by low potassium levels can be very serious. Follow-up care is a key part of your treatment and safety. Be sure to make and go to all appointments, and call your doctor if you are having problems. It's also a good idea to know your test results and keep a list of the medicines you take. How can you care for yourself at home? · If your doctor recommends it, eat foods that have a lot of potassium. These include fresh fruits, juices, and vegetables. They also include nuts, beans, and milk. · Be safe with medicines.  If your doctor prescribes medicines or potassium supplements, take them exactly as directed. Call your doctor if you have any problems with your medicines. · Get your potassium levels tested as often as your doctor tells you. When should you call for help? [id number] anytime you think you may need emergency care. For example, call if:  · You feel like your heart is missing beats. Heart problems caused by low potassium can cause death. · You passed out (lost consciousness). · You have a seizure. Call your doctor now or seek immediate medical care if:  · You feel weak or unusually tired. · You have severe arm or leg cramps. · You have tingling or numbness. · You feel sick to your stomach, or you vomit. · You have belly cramps. · You feel bloated or constipated. · You have to urinate a lot. · You feel very thirsty most of the time. · You are dizzy or lightheaded, or you feel like you may faint. · You feel depressed, or you lose touch with reality. Watch closely for changes in your health, and be sure to contact your doctor if:  · You do not get better as expected. Where can you learn more? Go to http://bethany-nia.info/  Enter G358 in the search box to learn more about \"Hypokalemia: Care Instructions. \"  Current as of: July 29, 2019               Content Version: 12.5  © 5986-5738 [employer]. Care instructions adapted under license by [employer] (which disclaims liability or warranty for this information). If you have questions about a medical condition or this instruction, always ask your healthcare professional. [address] any warranty or liability for your use of this information.

## 2020-06-24 NOTE — ED TRIAGE NOTES
Pt reports lower abdominal and back pain, all the way around pelvis/back, reports the last time she had pain like this she had a UTI.

## 2020-06-24 NOTE — ED PROVIDER NOTES
EMERGENCY DEPARTMENT HISTORY AND PHYSICAL EXAM      Date: 6/24/2020  Patient Name: Jaja Lebron    History of Presenting Illness     Chief Complaint   Patient presents with    Back Pain     History Provided By: Patient    HPI: Jaja Lebron, 36 y.o. female with history significant for depression, asthma, ectopic pregnancy, cholecystectomy, right inguinal hernia with repair, oophorectomy (right), tobacco abuse who presents via private vehicle to the ED with cc of acute moderate persistent bilateral low back pain X 2 weeks with associated suprapubic abdominal pain. Denies any known injury or trauma. No heavy lifting. Patient was recently evaluated at [facility] ED on 6/12/2020 for low back pain. Diagnosed with lumbar strain. Patient endorses muscle relaxer and over-the-counter analgesics without relief. Last took Tylenol and muscle relaxer 4 hours prior to arrival.  Denies fever, chills, nausea, vomiting, dysuria, frequency, urgency, hematuria, vaginal discharge, vaginal bleeding, constipation, diarrhea, numbness, tingling, focal weakness, saddle paresthesias, incontinence, urinary retention, chest pain, shortness of breath, palpitations. PCP: Gerry Puckett MD    There are no other complaints, changes, or physical findings at this time. No current facility-administered medications on file prior to encounter. Current Outpatient Medications on File Prior to Encounter   Medication Sig Dispense Refill    methocarbamoL (Robaxin) 500 mg tablet Take 1 Tab by mouth four (4) times daily. 30 Tab 0    lidocaine (Lidocaine Pain Relief) 4 % patch Apply one patch every 12 hours 6 Patch 0    butalbital-acetaminophen-caffeine (FIORICET, ESGIC) -40 mg per tablet Take 1 Tab by mouth every six (6) hours as needed for Headache. Indications: a migraine headache 20 Tab 0    acetaminophen (Tylenol Extra Strength) 500 mg tablet Take 2 Tabs by mouth every six (6) hours as needed for Pain.  20 Tab 0    albuterol (PROVENTIL HFA, VENTOLIN HFA, PROAIR HFA) 90 mcg/actuation inhaler Take 2 Puffs by inhalation every four (4) hours as needed for Wheezing. 1 Inhaler 0     Past History     Past Medical History:  Past Medical History:   Diagnosis Date    Asthma     Delivery normal     Depression     Ectopic pregnancy     Miscarriage     Neurological disorder     migraines    Other ill-defined conditions(799.89)     ectopic pregnancy    Psychiatric disorder     anger      Past Surgical History:  Past Surgical History:   Procedure Laterality Date    ABDOMEN SURGERY PROC UNLISTED      HX CHOLECYSTECTOMY      HX GYN      right oophetrecomy    HX HERNIA REPAIR  14    Open repair right inguinal hernia with mesh    HX ORTHOPAEDIC      Right arm/leg     Family History:  Family History   Problem Relation Age of Onset    Cancer Mother         breast    Hypertension Mother     Thyroid Disease Mother     Sickle Cell Trait Father     Asthma Sister     Asthma Brother     Asthma Sister     Asthma Sister     Asthma Sister     Asthma Sister     Asthma Sister     Asthma Brother     Asthma Brother     Asthma Brother     Asthma Brother     Sickle Cell Anemia Brother     Asthma Brother     Diabetes Paternal Grandmother      Social History:  Social History     Tobacco Use    Smoking status: Current Some Day Smoker     Packs/day: 0.00     Types: Cigarettes     Last attempt to quit: 2016     Years since quittin.2    Smokeless tobacco: Never Used    Tobacco comment: social smoker   Substance Use Topics    Alcohol use: Yes     Comment: occasionally    Drug use: No     Allergies: Allergies   Allergen Reactions    Ibuprofen Hives and Itching     Review of Systems   Review of Systems   Constitutional: Negative for activity change, appetite change, chills, fatigue, fever and unexpected weight change. HENT: Negative.   Negative for congestion, postnasal drip, rhinorrhea, sore throat, trouble swallowing and voice change. Respiratory: Negative for cough, chest tightness, shortness of breath and wheezing. Cardiovascular: Negative for chest pain, palpitations and leg swelling. Gastrointestinal: Positive for abdominal pain. Negative for abdominal distention, blood in stool, constipation, diarrhea, nausea and vomiting. Genitourinary: Negative for decreased urine volume, difficulty urinating, dysuria, flank pain, frequency, pelvic pain and urgency. Musculoskeletal: Positive for back pain. Negative for arthralgias, myalgias, neck pain and neck stiffness. Skin: Negative. Negative for rash. Neurological: Negative for light-headedness and headaches. Psychiatric/Behavioral: Negative. Physical Exam   Physical Exam  Vitals signs and nursing note reviewed. Constitutional:       General: She is not in acute distress. Appearance: Normal appearance. She is well-developed. She is not ill-appearing, toxic-appearing or diaphoretic. HENT:      Head: Normocephalic and atraumatic. Right Ear: External ear normal.      Left Ear: External ear normal.      Nose: Nose normal.      Mouth/Throat:      Mouth: Mucous membranes are moist.   Eyes:      Conjunctiva/sclera: Conjunctivae normal.      Pupils: Pupils are equal, round, and reactive to light. Neck:      Musculoskeletal: Normal range of motion and neck supple. No neck rigidity. Cardiovascular:      Rate and Rhythm: Normal rate and regular rhythm. Pulses: Normal pulses. Heart sounds: Normal heart sounds. Pulmonary:      Effort: Pulmonary effort is normal.      Breath sounds: Normal breath sounds and air entry. Abdominal:      General: Bowel sounds are normal.      Palpations: Abdomen is soft. Tenderness: There is no abdominal tenderness. There is no right CVA tenderness, left CVA tenderness, guarding or rebound. Negative signs include Franz's sign and McBurney's sign. Musculoskeletal: Normal range of motion.          General: Tenderness present. No deformity. Cervical back: Normal.      Thoracic back: Normal.      Lumbar back: She exhibits tenderness, bony tenderness and pain. She exhibits normal range of motion, no swelling, no edema, no deformity, no laceration, no spasm and normal pulse. Comments: No crepitus, step-off, edema, instability, deformity. Neurovascular intact. Full range of motion of cervical, thoracic, lumbar spine. Skin:     General: Skin is warm and dry. Neurological:      Mental Status: She is alert and oriented to person, place, and time. Cranial Nerves: No cranial nerve deficit. Psychiatric:         Behavior: Behavior normal.         Thought Content:  Thought content normal.         Judgment: Judgment normal.       Diagnostic Study Results   Labs -     Recent Results (from the past 12 hour(s))   URINALYSIS W/ REFLEX CULTURE    Collection Time: 06/24/20  4:39 PM   Result Value Ref Range    Color DARK YELLOW      Appearance TURBID (A) CLEAR      Specific gravity 1.030 1.003 - 1.030      pH (UA) 5.5 5.0 - 8.0      Protein 30 (A) NEG mg/dL    Glucose Negative NEG mg/dL    Ketone 15 (A) NEG mg/dL    Blood SMALL (A) NEG      Urobilinogen 1.0 0.2 - 1.0 EU/dL    Nitrites Negative NEG      Leukocyte Esterase MODERATE (A) NEG      WBC 0-4 0 - 4 /hpf    RBC 0-5 0 - 5 /hpf    Epithelial cells MANY (A) FEW /lpf    Bacteria Negative NEG /hpf    UA:UC IF INDICATED CULTURE NOT INDICATED BY UA RESULT CNI      Mucus 1+ (A) NEG /lpf    Amorphous Crystals 1+ (A) NEG   CBC WITH AUTOMATED DIFF    Collection Time: 06/24/20  4:39 PM   Result Value Ref Range    WBC 7.6 3.6 - 11.0 K/uL    RBC 4.38 3.80 - 5.20 M/uL    HGB 11.9 11.5 - 16.0 g/dL    HCT 36.9 35.0 - 47.0 %    MCV 84.2 80.0 - 99.0 FL    MCH 27.2 26.0 - 34.0 PG    MCHC 32.2 30.0 - 36.5 g/dL    RDW 13.8 11.5 - 14.5 %    PLATELET 034 032 - 562 K/uL    MPV 10.2 8.9 - 12.9 FL    NRBC 0.0 0  WBC    ABSOLUTE NRBC 0.00 0.00 - 0.01 K/uL    NEUTROPHILS 56 32 - 75 %    LYMPHOCYTES 32 12 - 49 %    MONOCYTES 9 5 - 13 %    EOSINOPHILS 2 0 - 7 %    BASOPHILS 1 0 - 1 %    IMMATURE GRANULOCYTES 0 0.0 - 0.5 %    ABS. NEUTROPHILS 4.3 1.8 - 8.0 K/UL    ABS. LYMPHOCYTES 2.4 0.8 - 3.5 K/UL    ABS. MONOCYTES 0.7 0.0 - 1.0 K/UL    ABS. EOSINOPHILS 0.1 0.0 - 0.4 K/UL    ABS. BASOPHILS 0.1 0.0 - 0.1 K/UL    ABS. IMM. GRANS. 0.0 0.00 - 0.04 K/UL    DF AUTOMATED     METABOLIC PANEL, COMPREHENSIVE    Collection Time: 06/24/20  4:39 PM   Result Value Ref Range    Sodium 138 136 - 145 mmol/L    Potassium 3.0 (L) 3.5 - 5.1 mmol/L    Chloride 103 97 - 108 mmol/L    CO2 28 21 - 32 mmol/L    Anion gap 7 5 - 15 mmol/L    Glucose 113 (H) 65 - 100 mg/dL    BUN 9 6 - 20 MG/DL    Creatinine 0.88 0.55 - 1.02 MG/DL    BUN/Creatinine ratio 10 (L) 12 - 20      GFR est AA >60 >60 ml/min/1.73m2    GFR est non-AA >60 >60 ml/min/1.73m2    Calcium 8.5 8.5 - 10.1 MG/DL    Bilirubin, total 0.5 0.2 - 1.0 MG/DL    ALT (SGPT) 34 12 - 78 U/L    AST (SGOT) 24 15 - 37 U/L    Alk. phosphatase 62 45 - 117 U/L    Protein, total 7.7 6.4 - 8.2 g/dL    Albumin 4.0 3.5 - 5.0 g/dL    Globulin 3.7 2.0 - 4.0 g/dL    A-G Ratio 1.1 1.1 - 2.2     BILIRUBIN, CONFIRM    Collection Time: 06/24/20  4:39 PM   Result Value Ref Range    Bilirubin UA, confirm Negative NEG     HCG URINE, QL. - POC    Collection Time: 06/24/20  4:45 PM   Result Value Ref Range    Pregnancy test,urine (POC) Negative NEG         Radiologic Studies -   XR SPINE LUMB 2 OR 3 V   Final Result   IMPRESSION:       No fracture. Mild degenerative disc disease at L5-S1. No significant change. Xr Spine Lumb 2 Or 3 V    Result Date: 6/24/2020  IMPRESSION: No fracture. Mild degenerative disc disease at L5-S1. No significant change. Medical Decision Making   I am the first provider for this patient. I reviewed the vital signs, available nursing notes, past medical history, past surgical history, family history and social history.     Vital Signs-Reviewed the patient's vital signs. Patient Vitals for the past 24 hrs:   Temp Pulse Resp BP SpO2   06/24/20 1618 98.8 °F (37.1 °C) 93 18 142/85 96 %     Pulse Oximetry Analysis - 96% on RA and normal    Records Reviewed: Nursing Notes, Old Medical Records, Previous Radiology Studies and Previous Laboratory Studies    Provider Notes (Medical Decision Making):   Patient presents with low back pain and abdominal pain. DDx: mm strain, Gastroenteritis, SBO, appendicitis, colitis, IBD, diverticulitis, mesenteric ischemia, AAA or descending dissection, ACS, ureteral  Stone,  pathology. Will get labs and serial abdominal exams to determine if a CT is warranted. Upon re-examination, the patient has no focal abdominal tenderness to palpation. The patient has a normal WBC and signs and symptoms are consistent with a non-surgical cause of abdominal pain. The patient has been instructed to return to the ER if the abdominal pain has not improved within 24 hours or if they have any further change in condition for a CT scan of the abdomen and pelvis. The diagnosis, test results, medications, return instructions and follow up have been discussed with the patient. The patient has been given the opportunity to ask questions. The patient agrees and expresses understanding of the diagnosis, follow up and return instructions. The patient expresses understanding that although testing today is negative that a surgical issue could still develop and that follow up for a CT scan is essential if the symptoms have not improved in 24 hours. ED Course:   Initial assessment performed. The patients presenting problems have been discussed, and they are in agreement with the care plan formulated and outlined with them. I have encouraged them to ask questions as they arise throughout their visit. Progress Note:   Updated pt on all returned results and findings.  Discussed the importance of proper follow up as referred below along with return precautions. Pt in agreement with the care plan and expresses agreement with and understanding of all items discussed. Disposition:  5:20 PM  I have discussed with patient their diagnosis, treatment, and follow up plan. The patient agrees to follow up as outlined in discharge paperwork and also to return to the ED with any worsening. Swathi Short PA-C      PLAN:  1. Current Discharge Medication List      START taking these medications    Details   predniSONE (STERAPRED) 5 mg dose pack See administration instruction per 5mg dose pack  Qty: 21 Tab, Refills: 0         CONTINUE these medications which have NOT CHANGED    Details   methocarbamoL (Robaxin) 500 mg tablet Take 1 Tab by mouth four (4) times daily. Qty: 30 Tab, Refills: 0      lidocaine (Lidocaine Pain Relief) 4 % patch Apply one patch every 12 hours  Qty: 6 Patch, Refills: 0      butalbital-acetaminophen-caffeine (FIORICET, ESGIC) -40 mg per tablet Take 1 Tab by mouth every six (6) hours as needed for Headache. Indications: a migraine headache  Qty: 20 Tab, Refills: 0      acetaminophen (Tylenol Extra Strength) 500 mg tablet Take 2 Tabs by mouth every six (6) hours as needed for Pain. Qty: 20 Tab, Refills: 0      albuterol (PROVENTIL HFA, VENTOLIN HFA, PROAIR HFA) 90 mcg/actuation inhaler Take 2 Puffs by inhalation every four (4) hours as needed for Wheezing. Qty: 1 Inhaler, Refills: 0           2. Follow-up Information     Follow up With Specialties Details Why Contact Info    Jasson Guzmán MD [facility] Schedule an appointment as soon as possible for a visit in 1 week As needed, If symptoms worsen [address]  [phone]      [facility]  In 1 week As needed, If symptoms worsen [address]  [address] [facility],[address]  [address] [facility]  [phone]        Return to ED if worse     Diagnosis     Clinical Impression:   1. Degenerative disc disease, lumbar    2.  Hypokalemia            Please note that this dictation was completed with Dragon, computer voice recognition software. Quite often unanticipated grammatical, syntax, homophones, and other interpretive errors are inadvertently transcribed by the computer software. Please disregard these errors. Additionally, please excuse any errors that have escaped final proofreading.

## 2020-07-20 ENCOUNTER — HOSPITAL ENCOUNTER (EMERGENCY)
Age: 35
Discharge: HOME | End: 2020-07-20
Attending: EMERGENCY MEDICINE
Payer: MEDICAID

## 2020-07-20 VITALS
HEIGHT: 68 IN | WEIGHT: 183 LBS | RESPIRATION RATE: 18 BRPM | OXYGEN SATURATION: 100 % | SYSTOLIC BLOOD PRESSURE: 139 MMHG | HEART RATE: 65 BPM | BODY MASS INDEX: 27.74 KG/M2 | TEMPERATURE: 98.7 F | DIASTOLIC BLOOD PRESSURE: 93 MMHG

## 2020-07-20 DIAGNOSIS — S39.012A STRAIN OF LUMBAR REGION, INITIAL ENCOUNTER: Primary | ICD-10-CM

## 2020-07-20 PROCEDURE — 74011250636 HC RX REV CODE- 250/636: Performed by: NURSE PRACTITIONER

## 2020-07-20 PROCEDURE — 74011250637 HC RX REV CODE- 250/637: Performed by: NURSE PRACTITIONER

## 2020-07-20 PROCEDURE — 96372 THER/PROPH/DIAG INJ SC/IM: CPT

## 2020-07-20 PROCEDURE — 99283 EMERGENCY DEPT VISIT LOW MDM: CPT

## 2020-07-20 RX ORDER — DEXAMETHASONE SODIUM PHOSPHATE 100 MG/10ML
10 INJECTION INTRAMUSCULAR; INTRAVENOUS
Status: COMPLETED | OUTPATIENT
Start: 2020-07-20 | End: 2020-07-20

## 2020-07-20 RX ORDER — PREDNISONE 10 MG/1
TABLET ORAL
Qty: 21 TAB | Refills: 0 | OUTPATIENT
Start: 2020-07-20 | End: 2022-08-31

## 2020-07-20 RX ORDER — METHOCARBAMOL 500 MG/1
500 TABLET, FILM COATED ORAL ONCE
Status: COMPLETED | OUTPATIENT
Start: 2020-07-20 | End: 2020-07-20

## 2020-07-20 RX ORDER — ACETAMINOPHEN 500 MG
1000 TABLET ORAL
Qty: 20 TAB | Refills: 0 | OUTPATIENT
Start: 2020-07-20 | End: 2022-09-16

## 2020-07-20 RX ORDER — METHOCARBAMOL 500 MG/1
500 TABLET, FILM COATED ORAL 4 TIMES DAILY
Qty: 30 TAB | Refills: 0 | OUTPATIENT
Start: 2020-07-20 | End: 2022-09-16

## 2020-07-20 RX ADMIN — DEXAMETHASONE SODIUM PHOSPHATE 10 MG: 10 INJECTION INTRAMUSCULAR; INTRAVENOUS at 14:00

## 2020-07-20 RX ADMIN — METHOCARBAMOL 500 MG: 500 TABLET ORAL at 14:00

## 2020-07-20 NOTE — ED NOTES
Discharge instructions were given to the patient by Blake Ontiveros.     The patient left the Emergency Department ambulatory, alert and oriented and in no acute distress with 3 prescriptions. The patient was encouraged to call or return to the ED for worsening issues or problems and was encouraged to schedule a follow up appointment for continuing care. The patient verbalized understanding of discharge instructions and prescriptions, all questions were answered. The patient has no further concerns at this time.

## 2020-07-20 NOTE — ED NOTES
Pt presents ambulatory to ED complaining of lower back pain x2 weeks. Pt denies injury/trauma. Pt is alert and oriented x 4, RR even and unlabored, skin is warm and dry. Assesment completed and pt updated on plan of care. Emergency Department Nursing Plan of Care       The Nursing Plan of Care is developed from the Nursing assessment and Emergency Department Attending provider initial evaluation. The plan of care may be reviewed in the ED Provider note.     The Plan of Care was developed with the following considerations:   Patient / Family readiness to learn indicated by:verbalized understanding  Persons(s) to be included in education: patient  Barriers to Learning/Limitations:No    [address]    7/20/2020   2:06 PM

## 2020-07-20 NOTE — DISCHARGE INSTRUCTIONS

## 2020-07-20 NOTE — ED PROVIDER NOTES
EMERGENCY DEPARTMENT HISTORY AND PHYSICAL EXAM    Date: 7/20/2020  Patient Name: Maria Del Carmen Garcia    History of Presenting Illness     Chief Complaint   Patient presents with    Back Pain         History Provided By: Patient        HPI: Maria Del Carmen Garcia is a 36 y.o. female with a PMH of Migraines, asthma, depression who presents with back pain. Onset 2 days ago. Located in lower region. Patient states pain is worse with sitting and standing. No radiation to legs, leg weakness, paresthesias or incontinence. Patient reports chronic history of back pain over the last 7 months. States she has been followed with Ortho with last appointment 7/1/2020. States she was diagnosed with lumbar strain/arthritis and given diclofenac and baclofen for relief. Patient reports no symptom improvement but then also states that her pain began 2 days ago. Denies heavy lifting, pushing pulling or any back injury prior to symptom onset. Located in lower back  Pain worse with standing and sitting  No radiation      PCP: Anne Caatlan MD    Current Outpatient Medications   Medication Sig Dispense Refill    predniSONE (STERAPRED) 5 mg dose pack See administration instruction per 5mg dose pack 21 Tab 0    lidocaine (Lidocaine Pain Relief) 4 % patch Apply one patch every 12 hours 6 Patch 0    methocarbamoL (Robaxin) 500 mg tablet Take 1 Tab by mouth four (4) times daily. 30 Tab 0    butalbital-acetaminophen-caffeine (FIORICET, ESGIC) -40 mg per tablet Take 1 Tab by mouth every six (6) hours as needed for Headache. Indications: a migraine headache 20 Tab 0    acetaminophen (Tylenol Extra Strength) 500 mg tablet Take 2 Tabs by mouth every six (6) hours as needed for Pain. 20 Tab 0    albuterol (PROVENTIL HFA, VENTOLIN HFA, PROAIR HFA) 90 mcg/actuation inhaler Take 2 Puffs by inhalation every four (4) hours as needed for Wheezing.  1 Inhaler 0       Past History     Past Medical History:  Past Medical History:   Diagnosis Date    Asthma     Delivery normal     Depression     Ectopic pregnancy     Miscarriage     Neurological disorder     migraines    Other ill-defined conditions(799.89)     ectopic pregnancy    Psychiatric disorder     anger        Past Surgical History:  Past Surgical History:   Procedure Laterality Date    ABDOMEN SURGERY PROC UNLISTED      HX CHOLECYSTECTOMY      HX GYN      right oophetrecomy    HX HERNIA REPAIR  14    Open repair right inguinal hernia with mesh    HX ORTHOPAEDIC      Right arm/leg       Family History:  Family History   Problem Relation Age of Onset    Cancer Mother         breast    Hypertension Mother     Thyroid Disease Mother     Sickle Cell Trait Father     Asthma Sister     Asthma Brother     Asthma Sister     Asthma Sister     Asthma Sister     Asthma Sister     Asthma Sister     Asthma Brother     Asthma Brother     Asthma Brother     Asthma Brother     Sickle Cell Anemia Brother     Asthma Brother     Diabetes Paternal Grandmother        Social History:  Social History     Tobacco Use    Smoking status: Current Some Day Smoker     Packs/day: 0.00     Types: Cigarettes     Last attempt to quit: 2016     Years since quittin.3    Smokeless tobacco: Never Used    Tobacco comment: social smoker   Substance Use Topics    Alcohol use: Yes     Comment: occasionally    Drug use: No       Allergies: Allergies   Allergen Reactions    Ibuprofen Hives and Itching         Review of Systems   Review of Systems   Constitutional: Negative for appetite change, chills, fatigue and fever. HENT: Negative for congestion, ear pain and rhinorrhea. Respiratory: Negative for cough, chest tightness, shortness of breath and wheezing. Cardiovascular: Negative for chest pain. Gastrointestinal: Negative for abdominal pain, nausea and vomiting. Genitourinary: Negative for dysuria, frequency and urgency. Musculoskeletal: Positive for back pain.  Negative for arthralgias and joint swelling. Skin: Negative for color change and rash. Neurological: Negative for dizziness, numbness and headaches. All other systems reviewed and are negative. Physical Exam     Vitals:    07/20/20 1253   BP: (!) 111/91   Pulse: 84   Resp: 18   Temp: 98.7 °F (37.1 °C)   SpO2: 99%   Weight: 83 kg (183 lb)   Height: 5' 8\" (1.727 m)     Physical Exam  Vitals signs and nursing note reviewed. Constitutional:       General: She is not in acute distress. Appearance: She is well-developed. She is not ill-appearing or toxic-appearing. HENT:      Head: Normocephalic and atraumatic. Neck:      Musculoskeletal: Normal range of motion and neck supple. Cardiovascular:      Rate and Rhythm: Normal rate and regular rhythm. Pulses: Normal pulses. Heart sounds: Normal heart sounds. Pulmonary:      Effort: Pulmonary effort is normal.      Breath sounds: Normal breath sounds. Abdominal:      Palpations: Abdomen is soft. Musculoskeletal:      Cervical back: Normal.      Thoracic back: Normal.      Lumbar back: She exhibits decreased range of motion, tenderness (lumbar paraspinous tenderness with palpable spasms ) and spasm. She exhibits no bony tenderness and no deformity. Comments: No step offs or crepitus   Neg SLR   Sensation intact   Skin:     General: Skin is warm and dry. Neurological:      Mental Status: She is alert and oriented to person, place, and time. Diagnostic Study Results     Labs -   No results found for this or any previous visit (from the past 12 hour(s)). Radiologic Studies -   No orders to display     CT Results  (Last 48 hours)    None        CXR Results  (Last 48 hours)    None            Medical Decision Making   I am the first provider for this patient. I reviewed the vital signs, available nursing notes, past medical history, past surgical history, family history and social history.     Vital Signs-Reviewed the patient's vital signs. Records Reviewed: Nursing Notes, Old Medical Records and Previous Radiology Studies       69-year-old female with complaints of lower back pain exhibiting palpable spasm of the lumbar paraspinous region but no spinal tenderness or step-offs noted. Patient does have a history of lumbar strain and has been followed by Ortho approximately 3 weeks ago. Advised to follow-up to obtain physical therapy. Will treat pain with Medrol Dosepak and muscle relaxant. Advised to avoid the baclofen while taking Robaxin. Low suspicion for cauda equina due to lack of saddle anesthesia. Advised if symptoms worsen to follow-up with Ortho or PCP. Disposition:  Discharge     DISCHARGE NOTE:         Care plan outlined and precautions discussed. Patient has no new complaints, changes, or physical findings. * All of pt's questions and concerns were addressed. Patient was instructed and agrees to follow up with ortho, as well as to return to the ED upon further deterioration. Patient is ready to go home. Follow-up Information    None         Current Discharge Medication List          Provider Notes (Medical Decision Making):   DDX: Lumbar strain, Arthritis, Lumbar radiculopathy, Spinal stenosis       Procedures:  Procedures    Please note that this dictation was completed with Dragon, computer voice recognition software. Quite often unanticipated grammatical, syntax, homophones, and other interpretive errors are inadvertently transcribed by the computer software. Please disregard these errors. Additionally, please excuse any errors that have escaped final proofreading. Diagnosis     Clinical Impression: No diagnosis found.

## 2020-10-20 ENCOUNTER — HOSPITAL ENCOUNTER (EMERGENCY)
Age: 35
Discharge: HOME | End: 2020-10-20
Attending: EMERGENCY MEDICINE
Payer: MEDICAID

## 2020-10-20 VITALS
SYSTOLIC BLOOD PRESSURE: 143 MMHG | RESPIRATION RATE: 18 BRPM | OXYGEN SATURATION: 98 % | TEMPERATURE: 98.5 F | BODY MASS INDEX: 28.26 KG/M2 | WEIGHT: 186.5 LBS | DIASTOLIC BLOOD PRESSURE: 77 MMHG | HEART RATE: 83 BPM | HEIGHT: 68 IN

## 2020-10-20 DIAGNOSIS — Z18.9 EMBEDDED FOREIGN BODY: Primary | ICD-10-CM

## 2020-10-20 PROCEDURE — 74011000250 HC RX REV CODE- 250: Performed by: PHYSICIAN ASSISTANT

## 2020-10-20 PROCEDURE — 99282 EMERGENCY DEPT VISIT SF MDM: CPT

## 2020-10-20 PROCEDURE — 96372 THER/PROPH/DIAG INJ SC/IM: CPT

## 2020-10-20 RX ORDER — LIDOCAINE HYDROCHLORIDE 10 MG/ML
2 INJECTION, SOLUTION EPIDURAL; INFILTRATION; INTRACAUDAL; PERINEURAL ONCE
Status: COMPLETED | OUTPATIENT
Start: 2020-10-20 | End: 2020-10-20

## 2020-10-20 RX ADMIN — LIDOCAINE HYDROCHLORIDE 2 ML: 10 INJECTION, SOLUTION EPIDURAL; INFILTRATION; INTRACAUDAL; PERINEURAL at 20:16

## 2020-10-20 NOTE — ED TRIAGE NOTES
Pt presents with pain in upper right lip since last night. New piercing (3 wks ago) got lodged inside the upper lip and pt can't push it up.

## 2020-10-21 NOTE — ED NOTES
Patient given copy of dc instructions and 0 script(s). Patient  verbalized understanding of instructions and script (s). Patient given a current medication reconciliation form and verbalized understanding of their medications. Patient  verbalized understanding of the importance of discussing medications with  his or her physician or clinic they will be following up with. Patient alert and oriented and in no acute distress. Patient discharged home ambulatory .

## 2020-10-21 NOTE — ED PROVIDER NOTES
EMERGENCY DEPARTMENT HISTORY AND PHYSICAL EXAM      Date: 10/20/2020  Patient Name: Maxx Hernández    History of Presenting Illness     Chief Complaint   Patient presents with    Foreign Body     History Provided By: Patient    HPI: Maxx Hernández, 36 y.o. female with medical history significant for depression, asthma, obesity, tobacco abuse who presents via private vehicle to the ED with cc of acute moderate aching right upper lip pain and swelling X 2 days secondary to noticing that the lip ring that she has had for 3 weeks was embedded in her right upper lip. No known injury or trauma. Attempted to pull out wearing on her own at home, but was unsuccessful. No other modifying factors at home. Denies fever, chills, nausea, vomiting, purulent drainage, odor, headache, lightheadedness, dizziness. PCP: Jean Rodriguez MD    There are no other complaints, changes, or physical findings at this time. No current facility-administered medications on file prior to encounter. Current Outpatient Medications on File Prior to Encounter   Medication Sig Dispense Refill    methocarbamoL (Robaxin) 500 mg tablet Take 1 Tab by mouth four (4) times daily. 30 Tab 0    predniSONE (STERAPRED DS) 10 mg dose pack Use as directed 21 Tab 0    acetaminophen (Tylenol Extra Strength) 500 mg tablet Take 2 Tabs by mouth every six (6) hours as needed for Pain. 20 Tab 0    albuterol (PROVENTIL HFA, VENTOLIN HFA, PROAIR HFA) 90 mcg/actuation inhaler Take 2 Puffs by inhalation every four (4) hours as needed for Wheezing.  1 Inhaler 0     Past History     Past Medical History:  Past Medical History:   Diagnosis Date    Asthma     Delivery normal     Depression     Ectopic pregnancy     Miscarriage     Neurological disorder     migraines    Other ill-defined conditions(349.28)     ectopic pregnancy    Psychiatric disorder     anger      Past Surgical History:  Past Surgical History:   Procedure Laterality Date    ABDOMEN SURGERY PROC UNLISTED      HX CHOLECYSTECTOMY      HX GYN      right oophetrecomy    HX HERNIA REPAIR  14    Open repair right inguinal hernia with mesh    HX ORTHOPAEDIC      Right arm/leg     Family History:  Family History   Problem Relation Age of Onset    Cancer Mother         breast    Hypertension Mother     Thyroid Disease Mother     Sickle Cell Trait Father     Asthma Sister     Asthma Brother     Asthma Sister     Asthma Sister     Asthma Sister     Asthma Sister     Asthma Sister     Asthma Brother     Asthma Brother     Asthma Brother     Asthma Brother     Sickle Cell Anemia Brother     Asthma Brother     Diabetes Paternal Grandmother      Social History:  Social History     Tobacco Use    Smoking status: Current Some Day Smoker     Packs/day: 0.00     Types: Cigarettes     Last attempt to quit: 2016     Years since quittin.5    Smokeless tobacco: Never Used    Tobacco comment: social smoker   Substance Use Topics    Alcohol use: Yes     Comment: occasionally    Drug use: No     Allergies: Allergies   Allergen Reactions    Ibuprofen Hives and Itching     Review of Systems   Review of Systems   Constitutional: Negative. Negative for activity change, chills and fever. HENT: Negative. Negative for congestion, dental problem, drooling, ear pain, facial swelling, hearing loss, mouth sores, postnasal drip, rhinorrhea, sinus pressure, sinus pain and sore throat. Eyes: Negative. Negative for pain. Respiratory: Negative. Negative for cough and shortness of breath. Cardiovascular: Negative for chest pain. Gastrointestinal: Negative. Negative for abdominal pain, constipation, diarrhea, nausea and vomiting. Genitourinary: Negative. Negative for dysuria. Musculoskeletal: Negative. Negative for joint swelling. Skin: Negative for color change, pallor and rash. Neurological: Negative for dizziness, light-headedness, numbness and headaches. Psychiatric/Behavioral: Negative. Physical Exam   Physical Exam  Vitals signs and nursing note reviewed. Constitutional:       General: She is not in acute distress. Appearance: She is well-developed. She is not diaphoretic. HENT:      Head: Normocephalic and atraumatic. Jaw: There is normal jaw occlusion. Right Ear: Hearing and external ear normal.      Left Ear: Hearing and external ear normal.      Nose: Nose normal.      Mouth/Throat:      Mouth: No lacerations or angioedema. Tongue: No lesions. Tongue does not deviate from midline. Palate: No lesions. Comments: No erythema, warmth, drainage, fluctuance, induration, streaking. Eyes:      Conjunctiva/sclera: Conjunctivae normal.      Pupils: Pupils are equal, round, and reactive to light. Neck:      Musculoskeletal: Normal range of motion. Pulmonary:      Effort: Pulmonary effort is normal. No respiratory distress. Musculoskeletal: Normal range of motion. Skin:     General: Skin is warm and dry. Findings: No abscess. Neurological:      Mental Status: She is alert and oriented to person, place, and time. Psychiatric:         Behavior: Behavior normal.         Thought Content: Thought content normal.         Judgment: Judgment normal.       Diagnostic Study Results   Labs -   No results found for this or any previous visit (from the past 12 hour(s)). Radiologic Studies -   No orders to display     No results found. Medical Decision Making   I am the first provider for this patient. I reviewed the vital signs, available nursing notes, past medical history, past surgical history, family history and social history. Vital Signs-Reviewed the patient's vital signs.   Patient Vitals for the past 24 hrs:   Temp Pulse Resp BP SpO2   10/20/20 1833 98.5 °F (36.9 °C) 83 18 (!) 143/77 98 %     Pulse Oximetry Analysis - 98% on RA and normal    Records Reviewed: Nursing Notes, Old Medical Records, Previous Radiology Studies and Previous Laboratory Studies    Provider Notes (Medical Decision Making):   26-year-old female presents with embedded right upper lip ring. No signs of infection. Will attempt to remove foreign body. Differential foods foreign body, abscess, cellulitis, puncture wound. ED Course:   Initial assessment performed. The patients presenting problems have been discussed, and they are in agreement with the care plan formulated and outlined with them. I have encouraged them to ask questions as they arise throughout their visit. ED Course as of Oct 20 2124   Tue Oct 20, 2020   2041 Procedure Note - Foreign Body Removal:  8:41 PM  Performed by: HOWARD Beckett  Foreign body was seen in the Rt upper lip. Procedural sedation was not used. Foreign body removed with alligator forceps without difficulty. Estimated blood loss: 0  The procedure took 1-15 minutes, and pt tolerated well.    [SM]      ED Course User Index  [SM] Julia Bowser PA-C     Progress Note:   Updated pt on all returned results and findings. Discussed the importance of proper follow up as referred below along with return precautions. Pt in agreement with the care plan and expresses agreement with and understanding of all items discussed. Disposition:  9:00 PM  I have discussed with patient their diagnosis, treatment, and follow up plan. The patient agrees to follow up as outlined in discharge paperwork and also to return to the ED with any worsening. Cuong Contreras PA-C      PLAN:  1. Current Discharge Medication List        2. Follow-up Information    None       Return to ED if worse     Diagnosis     Clinical Impression: No diagnosis found. Please note that this dictation was completed with Dragon, computer voice recognition software. Quite often unanticipated grammatical, syntax, homophones, and other interpretive errors are inadvertently transcribed by the computer software.   Please disregard these errors. Additionally, please excuse any errors that have escaped final proofreading.

## 2020-10-21 NOTE — ED NOTES
Pt presents with pain in upper right lip since last night. New piercing (3 wks ago) got lodged inside the upper lip and pt can't push it up. Warm blanket offered, call bell within reach, safety precautions in place, bed locked and in the lowest position. Emergency Department Nursing Plan of Care       The Nursing Plan of Care is developed from the Nursing assessment and Emergency Department Attending provider initial evaluation. The plan of care may be reviewed in the ED Provider note.     The Plan of Care was developed with the following considerations:   Patient / Family readiness to learn indicated by:verbalized understanding  Persons(s) to be included in education: patient  Barriers to Learning/Limitations:No    Signed     Devonte Lagos RN    10/20/2020   9:27 PM

## 2020-10-21 NOTE — DISCHARGE INSTRUCTIONS
Patient Education     Wound Care: After Your Visit  Your Care Instructions  Taking good care of your wound at home will help it heal quickly and reduce your chance of infection. The doctor has checked you carefully, but problems can develop later. If you notice any problems or new symptoms, get medical treatment right away. Follow-up care is a key part of your treatment and safety. Be sure to make and go to all appointments, and call your doctor if you are having problems. It's also a good idea to know your test results and keep a list of the medicines you take. How can you care for yourself at home? · Clean the area with soap and water 2 times a day unless your doctor gives you different instructions. Don't use hydrogen peroxide or alcohol, which can slow healing. ¨ You may cover the wound with a thin layer of antibiotic ointment, such as bacitracin, and a nonstick bandage. ¨ Apply more ointment and replace the bandage as needed. · Take pain medicines exactly as directed. Some pain is normal with a wound, but do not ignore pain that is getting worse instead of better. You could have an infection. ¨ If the doctor gave you a prescription medicine for pain, take it as prescribed. ¨ If you are not taking a prescription pain medicine, ask your doctor if you can take an over-the-counter medicine. · Your doctor may have closed your wound with stitches (sutures), staples, or skin glue. ¨ If you have stitches, your doctor may remove them after several days to 2 weeks. Or you may have stitches that dissolve on their own. ¨ If you have staples, your doctor may remove them after 7 to 10 days. ¨ If your wound was closed with skin glue, the glue will wear off in a few days to 2 weeks. When should you call for help? Call your doctor now or seek immediate medical care if:  · You have signs of infection, such as:  ¨ Increased pain, swelling, warmth, or redness near the wound.   ¨ Red streaks leading from the wound.  ¨ Pus draining from the wound. ¨ A fever. · You bleed so much from your incision that you soak one or more bandages over 2 to 4 hours. Watch closely for changes in your health, and be sure to contact your doctor if:  · The wound is not getting better each day. Where can you learn more? Go to via680.be  Enter M973 in the search box to learn more about \"Wound Care: After Your Visit. \"   © 3846-1757 [employer]. Care instructions adapted under license by [employer] (which disclaims liability or warranty for this information). This care instruction is for use with your licensed healthcare professional. If you have questions about a medical condition or this instruction, always ask your healthcare professional. [address] any warranty or liability for your use of this information. Content Version: 54.9.360214;  Last Revised: April 23, 2012

## 2020-11-03 ENCOUNTER — HOSPITAL ENCOUNTER (EMERGENCY)
Age: 35
Discharge: HOME | End: 2020-11-03
Attending: EMERGENCY MEDICINE
Payer: MEDICAID

## 2020-11-03 VITALS
WEIGHT: 186 LBS | TEMPERATURE: 98 F | BODY MASS INDEX: 28.19 KG/M2 | RESPIRATION RATE: 18 BRPM | OXYGEN SATURATION: 98 % | SYSTOLIC BLOOD PRESSURE: 134 MMHG | DIASTOLIC BLOOD PRESSURE: 74 MMHG | HEIGHT: 68 IN | HEART RATE: 88 BPM

## 2020-11-03 DIAGNOSIS — B37.31 YEAST VAGINITIS: ICD-10-CM

## 2020-11-03 DIAGNOSIS — J06.9 VIRAL UPPER RESPIRATORY INFECTION: Primary | ICD-10-CM

## 2020-11-03 LAB
APPEARANCE UR: ABNORMAL
BACTERIA URNS QL MICRO: NEGATIVE /HPF
BILIRUB UR QL: NEGATIVE
CLUE CELLS VAG QL WET PREP: NORMAL
COLOR UR: ABNORMAL
DEPRECATED S PYO AG THROAT QL EIA: NEGATIVE
EPITH CASTS URNS QL MICRO: ABNORMAL /LPF
GLUCOSE UR STRIP.AUTO-MCNC: NEGATIVE MG/DL
HCG UR QL: NEGATIVE
HGB UR QL STRIP: ABNORMAL
KETONES UR QL STRIP.AUTO: NEGATIVE MG/DL
KOH PREP SPEC: NORMAL
LEUKOCYTE ESTERASE UR QL STRIP.AUTO: NEGATIVE
NITRITE UR QL STRIP.AUTO: NEGATIVE
PH UR STRIP: 5.5 [PH] (ref 5–8)
PROT UR STRIP-MCNC: NEGATIVE MG/DL
RBC #/AREA URNS HPF: ABNORMAL /HPF (ref 0–5)
SERVICE CMNT-IMP: NORMAL
SP GR UR REFRACTOMETRY: 1.02 (ref 1–1.03)
T VAGINALIS VAG QL WET PREP: NORMAL
UA: UC IF INDICATED,UAUC: ABNORMAL
UROBILINOGEN UR QL STRIP.AUTO: 1 EU/DL (ref 0.2–1)
WBC URNS QL MICRO: ABNORMAL /HPF (ref 0–4)
YEAST URNS QL MICRO: PRESENT

## 2020-11-03 PROCEDURE — 87210 SMEAR WET MOUNT SALINE/INK: CPT

## 2020-11-03 PROCEDURE — 87070 CULTURE OTHR SPECIMN AEROBIC: CPT

## 2020-11-03 PROCEDURE — 87880 STREP A ASSAY W/OPTIC: CPT

## 2020-11-03 PROCEDURE — 81001 URINALYSIS AUTO W/SCOPE: CPT

## 2020-11-03 PROCEDURE — 87491 CHLMYD TRACH DNA AMP PROBE: CPT

## 2020-11-03 PROCEDURE — 81025 URINE PREGNANCY TEST: CPT

## 2020-11-03 PROCEDURE — 99283 EMERGENCY DEPT VISIT LOW MDM: CPT

## 2020-11-03 RX ORDER — FLUCONAZOLE 150 MG/1
150 TABLET ORAL
Qty: 2 TAB | Refills: 0 | Status: SHIPPED | OUTPATIENT
Start: 2020-11-03 | End: 2020-11-03

## 2020-11-03 RX ORDER — ACETAMINOPHEN 500 MG
1000 TABLET ORAL
Qty: 20 TAB | Refills: 0 | OUTPATIENT
Start: 2020-11-03 | End: 2022-09-16

## 2020-11-03 RX ORDER — CETIRIZINE HYDROCHLORIDE, PSEUDOEPHEDRINE HYDROCHLORIDE 5; 120 MG/1; MG/1
1 TABLET, FILM COATED, EXTENDED RELEASE ORAL 2 TIMES DAILY
Qty: 20 TAB | Refills: 0 | Status: SHIPPED | OUTPATIENT
Start: 2020-11-03 | End: 2022-03-04 | Stop reason: SDUPTHER

## 2020-11-03 RX ORDER — FLUCONAZOLE 150 MG/1
150 TABLET ORAL
Qty: 1 TAB | Refills: 0 | Status: SHIPPED | OUTPATIENT
Start: 2020-11-03 | End: 2020-11-03

## 2020-11-03 NOTE — ED NOTES
Pt presents to ED ambulatory complaining of vaginal discharge x2 days and cough x5 days. Pt is alert and oriented x 4, RR even and unlabored, skin is warm and dry. Assessment completed and pt updated on plan of care. Call bell in reach. Emergency Department Nursing Plan of Care       The Nursing Plan of Care is developed from the Nursing assessment and Emergency Department Attending provider initial evaluation. The plan of care may be reviewed in the ED Provider note.     The Plan of Care was developed with the following considerations:   Patient / Family readiness to learn indicated by:verbalized understanding  Persons(s) to be included in education: patient  Barriers to Learning/Limitations:No    Signed     Radha Gordillo RN    11/3/2020   1:21 PM

## 2020-11-03 NOTE — DISCHARGE INSTRUCTIONS
Patient Education        Upper Respiratory Infection (Cold): Care Instructions  Your Care Instructions     An upper respiratory infection, or URI, is an infection of the nose, sinuses, or throat. URIs are spread by coughs, sneezes, and direct contact. The common cold is the most frequent kind of URI. The flu and sinus infections are other kinds of URIs. Almost all URIs are caused by viruses. Antibiotics won't cure them. But you can treat most infections with home care. This may include drinking lots of fluids and taking over-the-counter pain medicine. You will probably feel better in 4 to 10 days. The doctor has checked you carefully, but problems can develop later. If you notice any problems or new symptoms, get medical treatment right away. Follow-up care is a key part of your treatment and safety. Be sure to make and go to all appointments, and call your doctor if you are having problems. It's also a good idea to know your test results and keep a list of the medicines you take. How can you care for yourself at home? · To prevent dehydration, drink plenty of fluids, enough so that your urine is light yellow or clear like water. Choose water and other caffeine-free clear liquids until you feel better. If you have kidney, heart, or liver disease and have to limit fluids, talk with your doctor before you increase the amount of fluids you drink. · Take an over-the-counter pain medicine, such as acetaminophen (Tylenol), ibuprofen (Advil, Motrin), or naproxen (Aleve). Read and follow all instructions on the label. · Before you use cough and cold medicines, check the label. These medicines may not be safe for young children or for people with certain health problems. · Be careful when taking over-the-counter cold or flu medicines and Tylenol at the same time. Many of these medicines have acetaminophen, which is Tylenol. Read the labels to make sure that you are not taking more than the recommended dose.  Too much acetaminophen (Tylenol) can be harmful. · Get plenty of rest.  · Do not smoke or allow others to smoke around you. If you need help quitting, talk to your doctor about stop-smoking programs and medicines. These can increase your chances of quitting for good. When should you call for help? Call 911 anytime you think you may need emergency care. For example, call if:    · You have severe trouble breathing. Call your doctor now or seek immediate medical care if:    · You seem to be getting much sicker.     · You have new or worse trouble breathing.     · You have a new or higher fever.     · You have a new rash. Watch closely for changes in your health, and be sure to contact your doctor if:    · You have a new symptom, such as a sore throat, an earache, or sinus pain.     · You cough more deeply or more often, especially if you notice more mucus or a change in the color of your mucus.     · You do not get better as expected. Where can you learn more? Go to http://www.gray.com/  Enter K520 in the search box to learn more about \"Upper Respiratory Infection (Cold): Care Instructions. \"  Current as of: February 24, 2020               Content Version: 12.6  © 4381-6332 [employer]. Care instructions adapted under license by [employer] (which disclaims liability or warranty for this information). If you have questions about a medical condition or this instruction, always ask your healthcare professional. [place] [address] any warranty or liability for your use of this information. Patient Education        Vaginal Yeast Infection: Care Instructions  Your Care Instructions     A vaginal yeast infection is caused by too many yeast cells in the vagina. This is common in women of all ages. Itching, vaginal discharge and irritation, and other symptoms can bother you. But yeast infections don't often cause other health problems.   Some medicines can increase your risk of getting a yeast infection. These include antibiotics, birth control pills, hormones, and steroids. You may also be more likely to get a yeast infection if you are pregnant, have diabetes, douche, or wear tight clothes. With treatment, most yeast infections get better in 2 to 3 days. Follow-up care is a key part of your treatment and safety. Be sure to make and go to all appointments, and call your doctor if you are having problems. It's also a good idea to know your test results and keep a list of the medicines you take. How can you care for yourself at home? · Take your medicines exactly as prescribed. Call your doctor if you think you are having a problem with your medicine. · Ask your doctor about over-the-counter (OTC) medicines for yeast infections. They may cost less than prescription medicines. If you use an OTC treatment, read and follow all instructions on the label. · Do not use tampons while using a vaginal cream or suppository. The tampons can absorb the medicine. Use pads instead. · Wear loose cotton clothing. Do not wear nylon or other fabric that holds body heat and moisture close to the skin. · Try sleeping without underwear. · Do not scratch. Relieve itching with a cold pack or a cool bath. · Do not wash your vaginal area more than once a day. Use plain water or a mild, unscented soap. Air-dry the vaginal area. · Change out of wet swimsuits after swimming. · Do not have sex until you have finished your treatment. · Do not douche. When should you call for help? Call your doctor now or seek immediate medical care if:    · You have unexpected vaginal bleeding.     · You have new or increased pain in your vagina or pelvis. Watch closely for changes in your health, and be sure to contact your doctor if:    · You have a fever.     · You are not getting better after 2 days.     · Your symptoms come back after you finish your medicines.    Where can you learn more?  Go to http://www.gray.com/  Enter T366 in the search box to learn more about \"Vaginal Yeast Infection: Care Instructions. \"  Current as of: November 8, 2019               Content Version: 12.6  © 9641-5431 [employer]. Care instructions adapted under license by [employer] (which disclaims liability or warranty for this information). If you have questions about a medical condition or this instruction, always ask your healthcare professional. [address] any warranty or liability for your use of this information.

## 2020-11-03 NOTE — ED PROVIDER NOTES
EMERGENCY DEPARTMENT HISTORY AND PHYSICAL EXAM      Date: 11/3/2020  Patient Name: Tray Santos    History of Presenting Illness     Chief Complaint   Patient presents with    Vaginal Itching    Cold Symptoms       History Provided By: Patient    HPI: Tray Santos, 36 y.o. female with PMHx significant for depression, asthma, presents to the ED with cc of cold symptoms and vaginal itching. For the last 4 to 5 days, the patient has had persistent nasal congestion, rhinorrhea, sore throat, and mild cough. She has tried taking over-the-counter cold and flu medication, as well as a few tablets of leftover prednisone without relief. She says she usually gets an upper respiratory infection around this time of the year. She also reports vaginal itching and irritation that began yesterday. She used clem pearls and she is unsure if this may have caused BV. She also used scented panty liners and is unsure if that may have caused her symptoms. She denies fever, chest pain, shortness of breath, nausea, vomiting, diarrhea, abdominal pain, vaginal pain, dysuria. She denies known COVID-19 exposures. There are no other complaints, changes, or physical findings at this time. PCP: Curt Soto MD    No current facility-administered medications on file prior to encounter. Current Outpatient Medications on File Prior to Encounter   Medication Sig Dispense Refill    methocarbamoL (Robaxin) 500 mg tablet Take 1 Tab by mouth four (4) times daily. 30 Tab 0    predniSONE (STERAPRED DS) 10 mg dose pack Use as directed 21 Tab 0    acetaminophen (Tylenol Extra Strength) 500 mg tablet Take 2 Tabs by mouth every six (6) hours as needed for Pain. 20 Tab 0    albuterol (PROVENTIL HFA, VENTOLIN HFA, PROAIR HFA) 90 mcg/actuation inhaler Take 2 Puffs by inhalation every four (4) hours as needed for Wheezing.  1 Inhaler 0       Past History     Past Medical History:  Past Medical History:   Diagnosis Date    Asthma  Delivery normal     Depression     Ectopic pregnancy     Miscarriage     Neurological disorder     migraines    Other ill-defined conditions(799.89)     ectopic pregnancy    Psychiatric disorder     anger        Past Surgical History:  Past Surgical History:   Procedure Laterality Date    ABDOMEN SURGERY PROC UNLISTED      HX CHOLECYSTECTOMY      HX GYN      right oophetrecomy    HX HERNIA REPAIR  15    Open repair right inguinal hernia with mesh    HX ORTHOPAEDIC      Right arm/leg       Family History:  Family History   Problem Relation Age of Onset    Cancer Mother         breast    Hypertension Mother     Thyroid Disease Mother     Sickle Cell Trait Father     Asthma Sister     Asthma Brother     Asthma Sister     Asthma Sister    Azul Asthma Sister     Asthma Sister     Asthma Sister     Asthma Brother     Asthma Brother     Asthma Brother     Asthma Brother     Sickle Cell Anemia Brother     Asthma Brother     Diabetes Paternal Grandmother        Social History:  Social History     Tobacco Use    Smoking status: Former Smoker     Packs/day: 0.00     Types: Cigarettes     Last attempt to quit: 2016     Years since quittin.5    Smokeless tobacco: Never Used    Tobacco comment: social smoker   Substance Use Topics    Alcohol use: Yes     Comment: occasionally    Drug use: No       Allergies: Allergies   Allergen Reactions    Ibuprofen Hives and Itching         Review of Systems   Review of Systems   Constitutional: Negative for chills and fever. HENT: Positive for congestion, rhinorrhea and sore throat. Negative for ear pain. Eyes: Negative for redness and visual disturbance. Respiratory: Positive for cough. Negative for shortness of breath. Cardiovascular: Negative for chest pain and palpitations. Gastrointestinal: Negative for abdominal pain, diarrhea, nausea and vomiting. Genitourinary: Positive for vaginal discharge.  Negative for dysuria, hematuria and vaginal pain. Musculoskeletal: Negative for back pain and gait problem. Skin: Negative for rash and wound. Neurological: Negative for dizziness and headaches. Psychiatric/Behavioral: Negative for behavioral problems and confusion. All other systems reviewed and are negative. Physical Exam   Physical Exam  Vitals signs and nursing note reviewed. Constitutional:       General: She is not in acute distress. Appearance: She is well-developed. Comments: Patient is mildly ill but nontoxic-appearing. HENT:      Head: Normocephalic and atraumatic. Nose:      Comments: Nasal turbinates are edematous and erythematous. No sinus tenderness. Mouth/Throat:      Comments: Mild erythema of the posterior oropharynx. No tonsillar enlargement or exudates. No cervical lymphadenopathy. Eyes:      Conjunctiva/sclera: Conjunctivae normal.      Pupils: Pupils are equal, round, and reactive to light. Neck:      Musculoskeletal: Normal range of motion and neck supple. Cardiovascular:      Rate and Rhythm: Normal rate and regular rhythm. Heart sounds: Normal heart sounds. Pulmonary:      Effort: Pulmonary effort is normal. No respiratory distress. Breath sounds: Normal breath sounds. No wheezing, rhonchi or rales. Genitourinary:     Comments: Deferred. Musculoskeletal: Normal range of motion. Skin:     General: Skin is warm and dry. Findings: No rash. Neurological:      Mental Status: She is alert and oriented to person, place, and time. GCS: GCS eye subscore is 4. GCS verbal subscore is 5. GCS motor subscore is 6. Cranial Nerves: No cranial nerve deficit. Sensory: No sensory deficit.    Psychiatric:         Behavior: Behavior normal.           Diagnostic Study Results     Labs -     Recent Results (from the past 12 hour(s))   HCG URINE, QL. - POC    Collection Time: 11/03/20  1:21 PM   Result Value Ref Range    Pregnancy test,urine (POC) Negative NEG URINALYSIS W/ REFLEX CULTURE    Collection Time: 11/03/20  1:24 PM    Specimen: Urine   Result Value Ref Range    Color YELLOW/STRAW      Appearance CLOUDY (A) CLEAR      Specific gravity 1.025 1.003 - 1.030      pH (UA) 5.5 5.0 - 8.0      Protein Negative NEG mg/dL    Glucose Negative NEG mg/dL    Ketone Negative NEG mg/dL    Bilirubin Negative NEG      Blood SMALL (A) NEG      Urobilinogen 1.0 0.2 - 1.0 EU/dL    Nitrites Negative NEG      Leukocyte Esterase Negative NEG      WBC 0-4 0 - 4 /hpf    RBC 0-5 0 - 5 /hpf    Epithelial cells FEW FEW /lpf    Bacteria Negative NEG /hpf    UA:UC IF INDICATED CULTURE NOT INDICATED BY UA RESULT CNI      Yeast PRESENT (A) NEG     KOH, OTHER SOURCES    Collection Time: 11/03/20  1:24 PM    Specimen: Vagina; Other   Result Value Ref Range    Special Requests: NO SPECIAL REQUESTS      KOH NO YEAST SEEN     WET PREP    Collection Time: 11/03/20  1:24 PM    Specimen: Miscellaneous sample   Result Value Ref Range    Clue cells CLUE CELLS ABSENT      Wet prep NO TRICHOMONAS SEEN     STREP AG SCREEN, GROUP A    Collection Time: 11/03/20  1:24 PM    Specimen: Serum; Throat   Result Value Ref Range    Group A Strep Ag ID Negative NEG         Radiologic Studies -   No orders to display     CT Results  (Last 48 hours)    None        CXR Results  (Last 48 hours)    None            Medical Decision Making   I am the first provider for this patient. I reviewed the vital signs, available nursing notes, past medical history, past surgical history, family history and social history. Vital Signs-Reviewed the patient's vital signs.   Patient Vitals for the past 12 hrs:   Temp Pulse Resp BP SpO2   11/03/20 1258 98 °F (36.7 °C) 88 18 134/74 98 %         Records Reviewed: Nursing Notes and Old Medical Records      Provider Notes (Medical Decision Making):   DDx: Viral upper respiratory infection, strep pharyngitis, sinusitis, BV, yeast infection, STI, pregnancy, UTI    Patient is afebrile and nontoxic-appearing. Her lungs are clear to auscultation. No indication for chest x-ray at this time. Plan for strep swab, vaginal swabs. ED Course:   Initial assessment performed. The patients presenting problems have been discussed, and they are in agreement with the care plan formulated and outlined with them. I have encouraged them to ask questions as they arise throughout their visit. ED Course as of Nov 03 1357   Tue Nov 03, 2020   1356 Reassessed patient and discussed results. Discussed that rapid strep is negative and that symptoms are consistent with a viral upper respiratory infection. Discussed symptomatic treatment. Will treat for yeast vaginitis with Diflucan. [CATRACHO]      ED Course User Index  [CATRACHO] Edwige Cochran, [id number]         Disposition:  1:57 PM    The patient has been re-evaluated and is ready for discharge. Reviewed available results with patient. Counseled patient on diagnosis and care plan. Patient has expressed understanding, and all questions have been answered. Patient agrees with plan and agrees to follow up as recommended, or to return to the ED if their symptoms worsen. Discharge instructions have been provided and explained to the patient, along with reasons to return to the ED. PLAN:  1. Current Discharge Medication List      START taking these medications    Details   cetirizine-pseudoePHEDrine (ZyrTEC-D) 5-120 mg Tb12 Take 1 Tab by mouth two (2) times a day. Qty: 20 Tab, Refills: 0      !! acetaminophen (TYLENOL) 500 mg tablet Take 2 Tabs by mouth every six (6) hours as needed for Pain. Qty: 20 Tab, Refills: 0      fluconazole (Diflucan) 150 mg tablet Take 1 Tab by mouth now for 1 dose. Take 1 tab now. Repeat dose in 3 days if symptoms not improving. Qty: 2 Tab, Refills: 0       !! - Potential duplicate medications found. Please discuss with provider.       CONTINUE these medications which have NOT CHANGED    Details   !! acetaminophen (Tylenol Extra Strength) 500 mg tablet Take 2 Tabs by mouth every six (6) hours as needed for Pain. Qty: 20 Tab, Refills: 0       !! - Potential duplicate medications found. Please discuss with provider. 2.   Follow-up Information     Follow up With Specialties Details Why Contact Info    Mary Altamirano MD Family Medicine Schedule an appointment as soon as possible for a visit in 1 week for a recheck [address] [place] [id number]  [phone]      [address] [place] [address] EMERGENCY DEPT Emergency Medicine Go to If symptoms worsen [facility] [place]  [phone]        Return to ED if worse     Diagnosis     Clinical Impression:   1. Viral upper respiratory infection    2. Yeast vaginitis            Aislinn Michael.  DORI Monterroso

## 2020-11-03 NOTE — ED TRIAGE NOTES
C/o vaginal itching with slight discharge x 2 days. Pt denies concern fot STDs, reports she gets a BV infection once a month. Pt reports recent use of clem pearls and believes she may have gotten an infection from that. Pt also reporting runny nose, congestion, slight cough, and sore throat x 5 days.

## 2020-11-05 LAB
BACTERIA SPEC CULT: NORMAL
SERVICE CMNT-IMP: NORMAL

## 2020-11-05 RX ORDER — AZITHROMYCIN 500 MG/1
1000 TABLET, FILM COATED ORAL ONCE
Qty: 2 TAB | Refills: 0 | Status: SHIPPED | OUTPATIENT
Start: 2020-11-05 | End: 2020-11-05

## 2020-11-05 NOTE — PROGRESS NOTES
Spoke with patient on the phone and educated on chlamydia test results. Azithromycin sent to preferred pharmacy. We will follow-up with GYN/PCP for test of cure in 2 weeks. Abstinence for 2 weeks discussed and strict ED precautions provided.

## 2021-03-21 ENCOUNTER — HOSPITAL ENCOUNTER (EMERGENCY)
Age: 36
Discharge: HOME | End: 2021-03-21
Attending: EMERGENCY MEDICINE
Payer: MEDICAID

## 2021-03-21 VITALS
TEMPERATURE: 98.3 F | SYSTOLIC BLOOD PRESSURE: 136 MMHG | HEART RATE: 96 BPM | WEIGHT: 182 LBS | BODY MASS INDEX: 27.58 KG/M2 | DIASTOLIC BLOOD PRESSURE: 88 MMHG | OXYGEN SATURATION: 99 % | RESPIRATION RATE: 16 BRPM | HEIGHT: 68 IN

## 2021-03-21 DIAGNOSIS — N39.0 URINARY TRACT INFECTION WITHOUT HEMATURIA, SITE UNSPECIFIED: Primary | ICD-10-CM

## 2021-03-21 LAB
APPEARANCE UR: ABNORMAL
BACTERIA URNS QL MICRO: ABNORMAL /HPF
BILIRUB UR QL: NEGATIVE
CLUE CELLS VAG QL WET PREP: NORMAL
COLOR UR: ABNORMAL
EPITH CASTS URNS QL MICRO: ABNORMAL /LPF
GLUCOSE UR STRIP.AUTO-MCNC: NEGATIVE MG/DL
HCG UR QL: NEGATIVE
HGB UR QL STRIP: ABNORMAL
KETONES UR QL STRIP.AUTO: NEGATIVE MG/DL
KOH PREP SPEC: NORMAL
LEUKOCYTE ESTERASE UR QL STRIP.AUTO: ABNORMAL
NITRITE UR QL STRIP.AUTO: NEGATIVE
PH UR STRIP: 5.5 [PH] (ref 5–8)
PROT UR STRIP-MCNC: NEGATIVE MG/DL
RBC #/AREA URNS HPF: ABNORMAL /HPF (ref 0–5)
SERVICE CMNT-IMP: NORMAL
SP GR UR REFRACTOMETRY: 1.02 (ref 1–1.03)
SPERM URNS QL MICRO: PRESENT
T VAGINALIS VAG QL WET PREP: NORMAL
UA: UC IF INDICATED,UAUC: ABNORMAL
UROBILINOGEN UR QL STRIP.AUTO: 0.2 EU/DL (ref 0.2–1)
WBC URNS QL MICRO: ABNORMAL /HPF (ref 0–4)

## 2021-03-21 PROCEDURE — 99283 EMERGENCY DEPT VISIT LOW MDM: CPT

## 2021-03-21 PROCEDURE — 87491 CHLMYD TRACH DNA AMP PROBE: CPT

## 2021-03-21 PROCEDURE — 96372 THER/PROPH/DIAG INJ SC/IM: CPT

## 2021-03-21 PROCEDURE — 87210 SMEAR WET MOUNT SALINE/INK: CPT

## 2021-03-21 PROCEDURE — 74011250637 HC RX REV CODE- 250/637: Performed by: NURSE PRACTITIONER

## 2021-03-21 PROCEDURE — 81001 URINALYSIS AUTO W/SCOPE: CPT

## 2021-03-21 PROCEDURE — 74011250636 HC RX REV CODE- 250/636: Performed by: NURSE PRACTITIONER

## 2021-03-21 PROCEDURE — 81025 URINE PREGNANCY TEST: CPT

## 2021-03-21 PROCEDURE — 74011000250 HC RX REV CODE- 250: Performed by: NURSE PRACTITIONER

## 2021-03-21 RX ORDER — NITROFURANTOIN 25; 75 MG/1; MG/1
100 CAPSULE ORAL 2 TIMES DAILY
Qty: 6 CAP | Refills: 0 | Status: SHIPPED | OUTPATIENT
Start: 2021-03-21 | End: 2021-03-24

## 2021-03-21 RX ORDER — AZITHROMYCIN 500 MG/1
1000 TABLET, FILM COATED ORAL
Status: COMPLETED | OUTPATIENT
Start: 2021-03-21 | End: 2021-03-21

## 2021-03-21 RX ADMIN — AZITHROMYCIN MONOHYDRATE 1000 MG: 500 TABLET ORAL at 17:53

## 2021-03-21 RX ADMIN — LIDOCAINE HYDROCHLORIDE 500 MG: 10 INJECTION, SOLUTION EPIDURAL; INFILTRATION; INTRACAUDAL; PERINEURAL at 17:53

## 2021-03-21 NOTE — ED NOTES
Patient is alert and oriented x 4 and in no acute distress at this time. Respirations are at a regular rate, depth, and pattern. Patient updated on plan of care and has no questions or concerns at this time. Call bell within reach. Will continue to monitor. Please reference nursing assessment. Emergency Department Nursing Plan of Care       The Nursing Plan of Care is developed from the Nursing assessment and Emergency Department Attending provider initial evaluation. The plan of care may be reviewed in the ED Provider note.     The Plan of Care was developed with the following considerations:   Patient / Family readiness to learn indicated by:verbalized understanding and successful return demonstration  Persons(s) to be included in education: patient  Barriers to Learning/Limitations:No    Signed     [address] Pruitt RN    3/21/2021   5:00 PM

## 2021-03-21 NOTE — ED TRIAGE NOTES
Patient presents to the ED with c/o vaginal itching x2 days. Pt reports using OTC femiclear with no relief. Pt denies any vaginal discharge or bleeding.

## 2021-03-22 NOTE — ED PROVIDER NOTES
EMERGENCY DEPARTMENT HISTORY AND PHYSICAL EXAM    Date: 3/21/2021  Patient Name: Paula Bird    History of Presenting Illness     Chief Complaint   Patient presents with    Vaginal Itching         History Provided By: Patient    Chief Complaint: vaginal problem  Duration: 2 Days  Timing:  Acute  Location: vagina  Quality: Burning and itching  Severity: Moderate  Modifying Factors: none  Associated Symptoms: denies any other associated signs or symptoms      HPI: Paula Bird is a 30 y.o. female with a PMH of No significant past medical history who presents with vaginal burning and itching for the past 2 days. 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 She denies vaginal discharge but states she would like to be tested and treated for sexually transmitted infections. PCP: Tim Hinojosa MD    Current Outpatient Medications   Medication Sig Dispense Refill    nitrofurantoin, macrocrystal-monohydrate, (Macrobid) 100 mg capsule Take 1 Cap by mouth two (2) times a day for 3 days. 6 Cap 0    cetirizine-pseudoePHEDrine (ZyrTEC-D) 5-120 mg Tb12 Take 1 Tab by mouth two (2) times a day. 20 Tab 0    acetaminophen (TYLENOL) 500 mg tablet Take 2 Tabs by mouth every six (6) hours as needed for Pain. 20 Tab 0    methocarbamoL (Robaxin) 500 mg tablet Take 1 Tab by mouth four (4) times daily. 30 Tab 0    predniSONE (STERAPRED DS) 10 mg dose pack Use as directed 21 Tab 0    acetaminophen (Tylenol Extra Strength) 500 mg tablet Take 2 Tabs by mouth every six (6) hours as needed for Pain. 20 Tab 0    albuterol (PROVENTIL HFA, VENTOLIN HFA, PROAIR HFA) 90 mcg/actuation inhaler Take 2 Puffs by inhalation every four (4) hours as needed for Wheezing.  1 Inhaler 0       Past History     Past Medical History:  Past Medical History:   Diagnosis Date    Asthma     Delivery normal     Depression     Ectopic pregnancy     Miscarriage     Neurological disorder     migraines    Other ill-defined conditions(799.89)     ectopic pregnancy    Psychiatric disorder     anger        Past Surgical History:  Past Surgical History:   Procedure Laterality Date    ABDOMEN SURGERY PROC UNLISTED      HX CHOLECYSTECTOMY      HX GYN      right oophetrecomy    HX HERNIA REPAIR  14    Open repair right inguinal hernia with mesh    HX ORTHOPAEDIC      Right arm/leg       Family History:  Family History   Problem Relation Age of Onset    Cancer Mother         breast    Hypertension Mother     Thyroid Disease Mother     Sickle Cell Trait Father     Asthma Sister     Asthma Brother     Asthma Sister     Asthma Sister     Asthma Sister     Asthma Sister     Asthma Sister     Asthma Brother     Asthma Brother     Asthma Brother     Asthma Brother     Sickle Cell Anemia Brother     Asthma Brother     Diabetes Paternal Grandmother        Social History:  Social History     Tobacco Use    Smoking status: Former Smoker     Packs/day: 0.00     Types: Cigarettes     Quit date: 2016     Years since quittin.9    Smokeless tobacco: Never Used    Tobacco comment: social smoker   Substance Use Topics    Alcohol use: Yes     Comment: occasionally    Drug use: No       Allergies: Allergies   Allergen Reactions    Ibuprofen Hives and Itching         Review of Systems   Review of Systems   Constitutional: Negative for fatigue and fever. Respiratory: Negative for shortness of breath. Cardiovascular: Negative for chest pain. Gastrointestinal: Negative for abdominal pain. Genitourinary:        Vaginal burning   Skin: Negative for pallor and rash. Neurological: Negative for dizziness, tremors and headaches. All other systems reviewed and are negative. Physical Exam     Vitals:    21 1648   BP: 136/88   Pulse: 96   Resp: 16   Temp: 98.3 °F (36.8 °C)   SpO2: 99%   Weight: 82.6 kg (182 lb)   Height: 5' 8\" (1.727 m)     Physical Exam  Vitals signs and nursing note reviewed. Exam conducted with a chaperone present. Constitutional:       General: She is not in acute distress. Appearance: She is well-developed. HENT:      Head: Normocephalic and atraumatic. Right Ear: External ear normal.      Left Ear: External ear normal.      Nose: Nose normal.   Eyes:      Conjunctiva/sclera: Conjunctivae normal.   Neck:      Musculoskeletal: Normal range of motion and neck supple. Cardiovascular:      Rate and Rhythm: Normal rate and regular rhythm. Heart sounds: Normal heart sounds. Pulmonary:      Effort: Pulmonary effort is normal. No respiratory distress. Breath sounds: Normal breath sounds. No wheezing. Abdominal:      General: Bowel sounds are normal.      Palpations: Abdomen is soft. Tenderness: There is no abdominal tenderness. Genitourinary:     Comments: Labia minora and majora are erythematous no lesions  Musculoskeletal: Normal range of motion. Lymphadenopathy:      Cervical: No cervical adenopathy. Skin:     General: Skin is warm and dry. Findings: No rash. Neurological:      Mental Status: She is alert and oriented to person, place, and time. Cranial Nerves: No cranial nerve deficit. Coordination: Coordination normal.   Psychiatric:         Behavior: Behavior normal.         Thought Content:  Thought content normal.         Judgment: Judgment normal.     Patient self swabbed to obtain STD cultures      Diagnostic Study Results     Labs -     Recent Results (from the past 12 hour(s))   HCG URINE, QL. - POC    Collection Time: 03/21/21  5:17 PM   Result Value Ref Range    Pregnancy test,urine (POC) Negative NEG     URINALYSIS W/ REFLEX CULTURE    Collection Time: 03/21/21  5:19 PM    Specimen: Urine   Result Value Ref Range    Color YELLOW/STRAW      Appearance CLOUDY (A) CLEAR      Specific gravity 1.020 1.003 - 1.030      pH (UA) 5.5 5.0 - 8.0      Protein Negative NEG mg/dL    Glucose Negative NEG mg/dL    Ketone Negative NEG mg/dL    Bilirubin Negative NEG      Blood TRACE (A) NEG      Urobilinogen 0.2 0.2 - 1.0 EU/dL    Nitrites Negative NEG      Leukocyte Esterase MODERATE (A) NEG      WBC 5-10 0 - 4 /hpf    RBC 0-5 0 - 5 /hpf    Epithelial cells MANY (A) FEW /lpf    Bacteria 2+ (A) NEG /hpf    UA:UC IF INDICATED CULTURE NOT INDICATED BY UA RESULT CNI      Spermatozoa PRESENT     KOH, OTHER SOURCES    Collection Time: 03/21/21  5:19 PM    Specimen: Vagina; Other   Result Value Ref Range    Special Requests: NO SPECIAL REQUESTS      KOH NO YEAST SEEN     WET PREP    Collection Time: 03/21/21  5:19 PM    Specimen: Miscellaneous sample   Result Value Ref Range    Clue cells CLUE CELLS ABSENT      Wet prep NO TRICHOMONAS SEEN         Radiologic Studies -   No orders to display     CT Results  (Last 48 hours)    None        CXR Results  (Last 48 hours)    None            Medical Decision Making   I am the first provider for this patient. I reviewed the vital signs, available nursing notes, past medical history, past surgical history, family history and social history. Vital Signs-Reviewed the patient's vital signs. Records Reviewed: Nursing Notes    Provider Notes (Medical Decision Making):   DDX vaginitis vulvovaginitis UTI STI          Disposition:  Home    DISCHARGE NOTE:           Care plan outlined and precautions discussed. Patient has no new complaints, changes, or physical findings. Results of tests were reviewed with the patient. All medications were reviewed with the patient; will d/c home with macrobid. All of pt's questions and concerns were addressed. Patient was instructed and agrees to follow up with PCP, as well as to return to the ED upon further deterioration. Patient is ready to go home.     Follow-up Information     Follow up With Specialties Details Why Contact Info    Ras Carbajal MD Family Medicine In 1 week  [address]  [phone]            Discharge Medication List as of 3/21/2021  5:45 PM      START taking these medications    Details   nitrofurantoin, macrocrystal-monohydrate, (Macrobid) 100 mg capsule Take 1 Cap by mouth two (2) times a day for 3 days. , Normal, Disp-6 Cap, R-0         CONTINUE these medications which have NOT CHANGED    Details   cetirizine-pseudoePHEDrine (ZyrTEC-D) 5-120 mg Tb12 Take 1 Tab by mouth two (2) times a day., Normal, Disp-20 Tab,R-0      !! acetaminophen (TYLENOL) 500 mg tablet Take 2 Tabs by mouth every six (6) hours as needed for Pain., Normal, Disp-20 Tab,R-0      methocarbamoL (Robaxin) 500 mg tablet Take 1 Tab by mouth four (4) times daily. , Normal, Disp-30 Tab,R-0      predniSONE (STERAPRED DS) 10 mg dose pack Use as directed, Normal, Disp-21 Tab,R-0      !! acetaminophen (Tylenol Extra Strength) 500 mg tablet Take 2 Tabs by mouth every six (6) hours as needed for Pain., Normal, Disp-20 Tab,R-0      albuterol (PROVENTIL HFA, VENTOLIN HFA, PROAIR HFA) 90 mcg/actuation inhaler Take 2 Puffs by inhalation every four (4) hours as needed for Wheezing., Normal, Disp-1 Inhaler, R-0       !! - Potential duplicate medications found. Please discuss with provider. Procedures:  Procedures    Please note that this dictation was completed with Dragon, computer voice recognition software. Quite often unanticipated grammatical, syntax, homophones, and other interpretive errors are inadvertently transcribed by the computer software. Please disregard these errors. Additionally, please excuse any errors that have escaped final proofreading. Diagnosis     Clinical Impression:   1.  Urinary tract infection without hematuria, site unspecified

## 2021-07-28 ENCOUNTER — HOSPITAL ENCOUNTER (EMERGENCY)
Age: 36
Discharge: HOME | End: 2021-07-29
Attending: EMERGENCY MEDICINE
Payer: MEDICAID

## 2021-07-28 VITALS
BODY MASS INDEX: 27.58 KG/M2 | RESPIRATION RATE: 16 BRPM | SYSTOLIC BLOOD PRESSURE: 132 MMHG | HEART RATE: 95 BPM | WEIGHT: 182 LBS | TEMPERATURE: 98.3 F | DIASTOLIC BLOOD PRESSURE: 99 MMHG | OXYGEN SATURATION: 99 % | HEIGHT: 68 IN

## 2021-07-28 DIAGNOSIS — J01.10 ACUTE NON-RECURRENT FRONTAL SINUSITIS: Primary | ICD-10-CM

## 2021-07-28 PROCEDURE — 99282 EMERGENCY DEPT VISIT SF MDM: CPT

## 2021-07-28 RX ORDER — OXYMETAZOLINE HCL 0.05 %
1 SPRAY, NON-AEROSOL (ML) NASAL
Status: DISCONTINUED | OUTPATIENT
Start: 2021-07-28 | End: 2021-07-29 | Stop reason: HOSPADM

## 2021-07-29 LAB
FLUAV RNA SPEC QL NAA+PROBE: NOT DETECTED
FLUBV RNA SPEC QL NAA+PROBE: NOT DETECTED
SARS-COV-2, COV2: NOT DETECTED

## 2021-07-29 PROCEDURE — 87636 SARSCOV2 & INF A&B AMP PRB: CPT

## 2021-07-29 RX ORDER — OXYMETAZOLINE HCL 0.05 %
2 SPRAY, NON-AEROSOL (ML) NASAL 2 TIMES DAILY
Qty: 1 EACH | Refills: 0 | Status: SHIPPED | OUTPATIENT
Start: 2021-07-29 | End: 2021-08-01

## 2021-07-29 RX ORDER — AZITHROMYCIN 250 MG/1
TABLET, FILM COATED ORAL
Qty: 6 TABLET | Refills: 0 | OUTPATIENT
Start: 2021-07-29 | End: 2022-08-31

## 2021-07-29 NOTE — ED TRIAGE NOTES
Pt to ED today complaining of nasal congestion, cough and wheezing x2days. Pt reports taking Symbicort, and Tylenol cold/sinus without relief. Pt denies fevers. Pt has a hx of asthma.

## 2021-07-29 NOTE — ED NOTES
Patient (s)  given copy of dc instructions and 0 paper script(s) and 2 electronic scripts. Patient (s)  verbalized understanding of instructions and script (s). Patient given a current medication reconciliation form and verbalized understanding of their medications. Patient (s) verbalized understanding of the importance of discussing medications with  his or her physician or clinic they will be following up with. Patient alert and oriented and in no acute distress. Patient offered wheelchair from treatment area to hospital entrance, patient declined wheelchair. decreased ROM/decreased strength

## 2021-07-29 NOTE — ED NOTES
This RN attempted to pull Afrin nasal spray to administer to Pt, Afrin is out of stock in the entire hospital. MD made aware. No new orders rec'd at this time.

## 2021-07-29 NOTE — ED PROVIDER NOTES
78-year-old female with a history of asthma presents with 2 days of runny nose, sinus drainage, cough productive of green mucus, sinus pressure. She is here with her sister who has had 3 days of the same symptoms and believes her sister got her sick. Denies wheeze, shortness of breath, fever, loss of taste or smell, nausea, vomiting, diarrhea.            Past Medical History:   Diagnosis Date    Asthma     Delivery normal     Depression     Ectopic pregnancy     Miscarriage     Neurological disorder     migraines    Other ill-defined conditions(629.89)     ectopic pregnancy    Psychiatric disorder     anger        Past Surgical History:   Procedure Laterality Date    ABDOMEN SURGERY PROC UNLISTED      HX CHOLECYSTECTOMY      HX GYN      right oophetrecomy    HX HERNIA REPAIR  14    Open repair right inguinal hernia with mesh    HX ORTHOPAEDIC      Right arm/leg         Family History:   Problem Relation Age of Onset    Cancer Mother         breast    Hypertension Mother     Thyroid Disease Mother     Sickle Cell Trait Father     Asthma Sister     Asthma Brother     Asthma Sister     Asthma Sister     Asthma Sister     Asthma Sister     Asthma Sister     Asthma Brother     Asthma Brother     Asthma Brother     Asthma Brother     Sickle Cell Anemia Brother     Asthma Brother     Diabetes Paternal Grandmother        Social History     Socioeconomic History    Marital status:      Spouse name: Not on file    Number of children: Not on file    Years of education: Not on file    Highest education level: Not on file   Occupational History    Not on file   Tobacco Use    Smoking status: Former Smoker     Packs/day: 0.00     Types: Cigarettes     Quit date: 2016     Years since quittin.3    Smokeless tobacco: Never Used    Tobacco comment: social smoker   Substance and Sexual Activity    Alcohol use: Yes     Comment: occasionally    Drug use: No    Sexual activity: Yes     Partners: Male     Birth control/protection: None   Other Topics Concern    Not on file   Social History Narrative    Not on file     Social Determinants of Health     Financial Resource Strain:     Difficulty of Paying Living Expenses:    Food Insecurity:     Worried About Running Out of Food in the Last Year:     [address] in the Last Year:    Transportation Needs:     Lack of Transportation (Medical):  Lack of Transportation (Non-Medical):    Physical Activity:     Days of Exercise per Week:     Minutes of Exercise per Session:    Stress:     Feeling of Stress :    Social Connections:     Frequency of Communication with Friends and Family:     Frequency of Social Gatherings with Friends and Family:     Attends [employer] Services:     Active Member of Clubs or Organizations:     Attends Club or Organization Meetings:     Marital Status:    Intimate Partner Violence:     Fear of Current or Ex-Partner:     Emotionally Abused:     Physically Abused:     Sexually Abused: ALLERGIES: Ibuprofen    Review of Systems   Constitutional: Negative. Negative for chills, fever and unexpected weight change. HENT: Positive for congestion, postnasal drip, rhinorrhea and sinus pressure. Negative for trouble swallowing. Eyes: Negative for discharge. Respiratory: Positive for cough. Negative for chest tightness and shortness of breath. Cardiovascular: Negative. Negative for chest pain. Gastrointestinal: Negative. Negative for abdominal distention, abdominal pain, constipation, diarrhea and nausea. Endocrine: Negative. Genitourinary: Negative. Negative for difficulty urinating, dysuria, frequency and urgency. Musculoskeletal: Negative. Negative for arthralgias and myalgias. Skin: Negative. Negative for color change. Allergic/Immunologic: Negative. Neurological: Negative. Negative for dizziness, speech difficulty and headaches.    Hematological: Negative. Psychiatric/Behavioral: Negative. Negative for agitation and confusion. All other systems reviewed and are negative. Vitals:    07/28/21 2254   BP: (!) 132/99   Pulse: 95   Resp: 16   Temp: 98.3 °F (36.8 °C)   SpO2: 99%   Weight: 82.6 kg (182 lb)   Height: 5' 8\" (1.727 m)            Physical Exam  Vitals and nursing note reviewed. Constitutional:       Appearance: She is well-developed. HENT:      Head: Normocephalic and atraumatic. Nose: Congestion and rhinorrhea present. Eyes:      Conjunctiva/sclera: Conjunctivae normal.   Cardiovascular:      Rate and Rhythm: Normal rate and regular rhythm. Pulmonary:      Effort: Pulmonary effort is normal. No respiratory distress. Breath sounds: Normal breath sounds. Abdominal:      Palpations: Abdomen is soft. Tenderness: There is no abdominal tenderness. Musculoskeletal:         General: No deformity. Normal range of motion. Cervical back: Neck supple. Skin:     General: Skin is warm and dry. Neurological:      Mental Status: She is alert and oriented to person, place, and time. Psychiatric:         Behavior: Behavior normal.         Thought Content: Thought content normal.          MDM  Number of Diagnoses or Management Options  Acute non-recurrent frontal sinusitis  Diagnosis management comments: URI, Covid, sinusitis         Procedures      LABORATORY TESTS:  Recent Results (from the past 12 hour(s))   COVID-19 WITH INFLUENZA A/B    Collection Time: 07/29/21 12:04 AM   Result Value Ref Range    SARS-CoV-2 Not detected NOTD      Influenza A by PCR Not detected      Influenza B by PCR Not detected         IMAGING RESULTS:  No orders to display       MEDICATIONS GIVEN:  Medications - No data to display    IMPRESSION:  1. Acute non-recurrent frontal sinusitis        PLAN:  1.    Discharge Medication List as of 7/29/2021  1:37 AM      START taking these medications    Details   azithromycin (Zithromax Z-Low) 250 mg tablet 2 tabs by mouth day 1, then 1 tab by mouth daily on days 2-5, Normal, Disp-6 Tablet, R-0      oxymetazoline (Afrin, oxymetazoline,) 0.05 % nasal spray 2 Sprays by Both Nostrils route two (2) times a day for 3 days. , Normal, Disp-1 Each, R-0         CONTINUE these medications which have NOT CHANGED    Details   cetirizine-pseudoePHEDrine (ZyrTEC-D) 5-120 mg Tb12 Take 1 Tab by mouth two (2) times a day., Normal, Disp-20 Tab,R-0      !! acetaminophen (Tylenol Extra Strength) 500 mg tablet Take 2 Tabs by mouth every six (6) hours as needed for Pain., Normal, Disp-20 Tab,R-0      albuterol (PROVENTIL HFA, VENTOLIN HFA, PROAIR HFA) 90 mcg/actuation inhaler Take 2 Puffs by inhalation every four (4) hours as needed for Wheezing., Normal, Disp-1 Inhaler, R-0      !! acetaminophen (TYLENOL) 500 mg tablet Take 2 Tabs by mouth every six (6) hours as needed for Pain., Normal, Disp-20 Tab,R-0      methocarbamoL (Robaxin) 500 mg tablet Take 1 Tab by mouth four (4) times daily. , Normal, Disp-30 Tab,R-0      predniSONE (STERAPRED DS) 10 mg dose pack Use as directed, Normal, Disp-21 Tab,R-0       !! - Potential duplicate medications found. Please discuss with provider.         2.   Follow-up Information     Follow up With Specialties Details Why Lucio Steward MD [facility]   [address] [place] [address]  [phone]          Return to ED if worse

## 2021-07-29 NOTE — ED TRIAGE NOTES
Emergency Department Nursing Plan of Care       The Nursing Plan of Care is developed from the Nursing assessment and Emergency Department Attending provider initial evaluation. The plan of care may be reviewed in the ED Provider note.     The Plan of Care was developed with the following considerations:   Patient / Family readiness to learn indicated by:verbalized understanding  Persons(s) to be included in education: patient  Barriers to Learning/Limitations:No    Signed     Carmen Almanza RN    7/28/2021   11:46 PM

## 2021-09-10 ENCOUNTER — HOSPITAL ENCOUNTER (EMERGENCY)
Age: 36
Discharge: HOME | End: 2021-09-10
Attending: EMERGENCY MEDICINE
Payer: MEDICAID

## 2021-09-10 VITALS
HEART RATE: 108 BPM | DIASTOLIC BLOOD PRESSURE: 92 MMHG | WEIGHT: 180 LBS | OXYGEN SATURATION: 98 % | RESPIRATION RATE: 19 BRPM | HEIGHT: 68 IN | TEMPERATURE: 100.3 F | SYSTOLIC BLOOD PRESSURE: 134 MMHG | BODY MASS INDEX: 27.28 KG/M2

## 2021-09-10 DIAGNOSIS — U07.1 COVID-19: Primary | ICD-10-CM

## 2021-09-10 DIAGNOSIS — R51.9 NONINTRACTABLE HEADACHE, UNSPECIFIED CHRONICITY PATTERN, UNSPECIFIED HEADACHE TYPE: ICD-10-CM

## 2021-09-10 LAB
FLUAV RNA SPEC QL NAA+PROBE: NOT DETECTED
FLUBV RNA SPEC QL NAA+PROBE: NOT DETECTED
SARS-COV-2, COV2: DETECTED

## 2021-09-10 PROCEDURE — 99283 EMERGENCY DEPT VISIT LOW MDM: CPT

## 2021-09-10 PROCEDURE — 74011250637 HC RX REV CODE- 250/637: Performed by: EMERGENCY MEDICINE

## 2021-09-10 PROCEDURE — 87636 SARSCOV2 & INF A&B AMP PRB: CPT

## 2021-09-10 RX ORDER — BUTALBITAL, ACETAMINOPHEN AND CAFFEINE 50; 325; 40 MG/1; MG/1; MG/1
1 TABLET ORAL
Status: DISCONTINUED | OUTPATIENT
Start: 2021-09-10 | End: 2021-09-10 | Stop reason: HOSPADM

## 2021-09-10 RX ORDER — BUTALBITAL, ACETAMINOPHEN AND CAFFEINE 50; 325; 40 MG/1; MG/1; MG/1
1 TABLET ORAL
Qty: 20 TABLET | Refills: 0 | OUTPATIENT
Start: 2021-09-10 | End: 2022-09-16

## 2021-09-10 RX ORDER — PSEUDOEPHEDRINE HCL 30 MG
30 TABLET ORAL
Qty: 15 TABLET | Refills: 0 | OUTPATIENT
Start: 2021-09-10 | End: 2022-09-16

## 2021-09-10 RX ORDER — ACETAMINOPHEN 500 MG
500 TABLET ORAL
Status: COMPLETED | OUTPATIENT
Start: 2021-09-10 | End: 2021-09-10

## 2021-09-10 RX ADMIN — ACETAMINOPHEN 500 MG: 500 TABLET ORAL at 16:44

## 2021-09-10 NOTE — Clinical Note
[facility] EMERGENCY DEPT  [id number] [facility] [id number] [phone]    Work/School Note    Date: 9/10/2021     To Whom It May concern:    Leonie Ayala was evaulated by the following provider(s):  Attending Provider: Jenn Burch MD.   Monia Mary virus is suspected. Per the CDC guidelines we recommend home isolation until the following conditions are all met:    1. At least 10 days have passed since symptoms first appeared and  2. At least 24 hours have passed since last fever without the use of fever-reducing medications and  3.  Symptoms (e.g., cough, shortness of breath) have improved    Sincerely,          Devonte Avila MD

## 2021-09-10 NOTE — ED TRIAGE NOTES
Per pt reports headache, fever, generalized body aches and runny nose x 3 days. Pt is alert and oriented x 4, speech is clear, no acute distress noted.

## 2021-09-10 NOTE — ED NOTES
Emergency Department Nursing Plan of Care       The Nursing Plan of Care is developed from the Nursing assessment and Emergency Department Attending provider initial evaluation. The plan of care may be reviewed in the ED Provider note.     The Plan of Care was developed with the following considerations:   Patient / Family readiness to learn indicated by:verbalized understanding  Persons(s) to be included in education: patient  Barriers to Learning/Limitations:No    Signed     Stephanie Gottlieb RN    9/10/2021   3:54 PM

## 2021-09-10 NOTE — ED PROVIDER NOTES
EMERGENCY DEPARTMENT HISTORY AND PHYSICAL EXAM      Date: 9/10/2021  Patient Name: Eloise Puckett    History of Presenting Illness     Chief Complaint   Patient presents with    Fever    Headache       History Provided By: Patient    HPI: Eloise Puckett, 30 y.o. female with PMHx as noted below presents the emergency department chief complaint of nasal congestion, sinus pressure, headache, fevers and chills. Patient noted onset of symptoms was 3 days ago. Symptoms been constant and without relieving or exacerbating factors. The headache is diffuse, mild dull aching pain. Patient states that she does suffer with migraines which are much more severe than her current headache. Denying any blurred/double vision, focal neurologic deficits or neck stiffness. Pt denies any other alleviating or exacerbating factors. Additionally, pt specifically denies any recent  nausea, vomiting, abdominal pain, CP, SOB, lightheadedness, dizziness, numbness, weakness, BLE swelling, heart palpitations, urinary sxs, diarrhea, constipation, melena, hematochezia,    PCP: No primary care provider on file. Current Facility-Administered Medications   Medication Dose Route Frequency Provider Last Rate Last Admin    butalbital-acetaminophen-caffeine (FIORICET, ESGIC) -40 mg per tablet 1 Tablet  1 Tablet Oral NOW Slava Eastman MD         Current Outpatient Medications   Medication Sig Dispense Refill    pseudoephedrine (Sudafed) 30 mg tablet Take 1 Tablet by mouth every four (4) hours as needed for Congestion. 15 Tablet 0    butalbital-acetaminophen-caffeine (FIORICET, ESGIC) -40 mg per tablet Take 1 Tablet by mouth every six (6) hours as needed for Headache. Indications: a migraine headache 20 Tablet 0       Past History     Past Medical History:  Migraines    Past Surgical History:  No past surgical history on file. Family History:  No family history on file.     Social History:  Denies heavy alcohol or illicit drug use  Allergies: Allergies   Allergen Reactions    Ibuprofen Itching         Review of Systems   Review of Systems  Constitutional: Positive for fever, chills, and fatigue. HENT: Positive for congestion. Negative neck stiffness   Eyes: Negative for discharge and redness. Respiratory: Negative for  shortness of breath, wheezing   Cardiovascular: Negative for chest pain, palpitations   Gastrointestinal: Negative for nausea, vomiting, abdominal pain, constipation, diarrhea and blood in stool. Genitourinary: Negative for dysuria, hematuria, flank pain, decreased urine volume, discharge,   Musculoskeletal: Negative for myalgias or joint pain . Skin: Negative for rash or lesions . Neurological: Negative weakness, light-headedness, numbness. Positive headaches. Physical Exam   Physical Exam    GENERAL: alert and oriented, no acute distress  EYES: PEERL, No injection, discharge or icterus. ENT: Mucous membranes pink and moist., Clear rhinorrhea noted  NECK: Supple, no meningismus  LUNGS: Airway patent. Non-labored respirations. Breath sounds clear with good air entry bilaterally. HEART:Mildly tachycardic, regular rhythm. No peripheral edema  ABDOMEN: Non-distended and non-tender, without guarding or rebound.   SKIN:  warm, dry  MSK/EXTREMITIES: Without swelling, tenderness or deformity, symmetric with normal ROM  NEUROLOGICAL:  alert and oriented x 3, CN II-XII grossly intact, strength 5/5 bilateral upper and lower extremities, sensation intact throughout to light touch, no dysmetria or ataxia noted        Diagnostic Study Results     Labs -     Recent Results (from the past 12 hour(s))   COVID-19 WITH INFLUENZA A/B    Collection Time: 09/10/21  4:48 PM   Result Value Ref Range    SARS-CoV-2 Detected (AA) NOTD      Influenza A by PCR Not detected      Influenza B by PCR Not detected         Radiologic Studies -   No orders to display     CT Results  (Last 48 hours)    None        CXR Results  (Last 48 hours)    None            Medical Decision Making     IJolie MD am the first provider for this patient and am the attending of record for this patient encounter. I reviewed the vital signs, available nursing notes, past medical history, past surgical history, family history and social history. Vital Signs-Reviewed the patient's vital signs. Patient Vitals for the past 12 hrs:   Temp Pulse Resp BP SpO2   09/10/21 1503 100.3 °F (37.9 °C) (!) 108 19 (!) 134/92 98 %         Records Reviewed: Nursing Notes and Old Medical Records    Provider Notes (Medical Decision Making): On presentation, the patient is well appearing, in no acute distress with reassuring vital signs. Based on my history and exam the differential diagnosis for this patient includes COVID 19, URI, sinusitis, bronchitis. Nothing to suggest strep pharyngitis, PNA or bacterial sinusitis. Patient complaining of headache that is relatively mild compared to her migraines, no reason to suspect meningitis, head bleed. Patient's Covid testing is positive. Patient was treated with Uristat with improvement in symptoms. Patient may have COVID 19, however is breathing comfortably, maintaining adequate O2 sats on room air and is overall well appearing so does not require admission at this time. Have recommended self quarantine per CDC guidelines. Symptomatic therapy suggested. Should return immediately to the emergency department develops shortness of breath, confusion, weakness or any othe new/worrisome symptoms       ED Course:   Initial assessment performed. The patients presenting problems have been discussed, and they are in agreement with the care plan formulated and outlined with them. I have encouraged them to ask questions as they arise throughout their visit.         Medications   butalbital-acetaminophen-caffeine (FIORICET, ESGIC) -40 mg per tablet 1 Tablet (has no administration in time range)   acetaminophen (TYLENOL) tablet 500 mg (500 mg Oral Given 9/10/21 0325)         PROGRESS  Carlene Alexander's  results have been reviewed with her. She has been counseled regarding her diagnosis. She verbally conveys understanding and agreement of the signs, symptoms, diagnosis, treatment and prognosis and additionally agrees to follow up as recommended. She also agrees with the care-plan and conveys that all of her questions have been answered. I have also put together some discharge instructions for her that include: 1) educational information regarding their diagnosis, 2) how to care for their diagnosis at home, as well a 3) list of reasons why they would want to return to the ED prior to their follow-up appointment, should their condition change. Disposition:  home    PLAN:  1. Discharge Medication List as of 9/10/2021  5:57 PM        2. Follow-up Information     Follow up With Specialties Details Why [address] [facility] EMERGENCY DEPT Emergency Medicine  If symptoms worsen [address] [facility] [address]    [facility] EMERGENCY DEPT Emergency Medicine  If symptoms worsen [address] [facility]    [address] [facility] Internal Medicine Schedule an appointment as soon as possible for a visit in 2 days  [address]  [phone]        Return to ED if worse     Diagnosis     Clinical Impression:   1. COVID-19    2. Nonintractable headache, unspecified chronicity pattern, unspecified headache type        Please note that this dictation was completed with Dragon, computer voice recognition software. Quite often unanticipated grammatical, syntax, homophones, and other interpretive errors are inadvertently transcribed by the computer software. Please disregard these errors. Additionally, please excuse any errors that have escaped final proofreading.

## 2021-09-11 ENCOUNTER — PATIENT OUTREACH (OUTPATIENT)
Dept: CASE MANAGEMENT | Age: 36
End: 2021-09-11

## 2022-03-04 ENCOUNTER — HOSPITAL ENCOUNTER (EMERGENCY)
Age: 37
Discharge: HOME | End: 2022-03-04
Attending: EMERGENCY MEDICINE
Payer: MEDICAID

## 2022-03-04 VITALS
OXYGEN SATURATION: 98 % | SYSTOLIC BLOOD PRESSURE: 126 MMHG | TEMPERATURE: 99.4 F | HEIGHT: 68 IN | BODY MASS INDEX: 26.9 KG/M2 | HEART RATE: 91 BPM | RESPIRATION RATE: 18 BRPM | WEIGHT: 177.5 LBS | DIASTOLIC BLOOD PRESSURE: 85 MMHG

## 2022-03-04 DIAGNOSIS — J45.21 MILD INTERMITTENT ASTHMA WITH ACUTE EXACERBATION: ICD-10-CM

## 2022-03-04 DIAGNOSIS — J06.9 UPPER RESPIRATORY TRACT INFECTION, UNSPECIFIED TYPE: Primary | ICD-10-CM

## 2022-03-04 PROCEDURE — 94640 AIRWAY INHALATION TREATMENT: CPT

## 2022-03-04 PROCEDURE — 99283 EMERGENCY DEPT VISIT LOW MDM: CPT

## 2022-03-04 PROCEDURE — 74011636637 HC RX REV CODE- 636/637: Performed by: EMERGENCY MEDICINE

## 2022-03-04 PROCEDURE — 77030025612 HC NEB KT VYRM -A

## 2022-03-04 PROCEDURE — 74011000250 HC RX REV CODE- 250: Performed by: EMERGENCY MEDICINE

## 2022-03-04 RX ORDER — PREDNISONE 20 MG/1
60 TABLET ORAL DAILY
Qty: 15 TABLET | Refills: 0 | Status: SHIPPED | OUTPATIENT
Start: 2022-03-04 | End: 2022-03-09

## 2022-03-04 RX ORDER — CETIRIZINE HYDROCHLORIDE, PSEUDOEPHEDRINE HYDROCHLORIDE 5; 120 MG/1; MG/1
1 TABLET, FILM COATED, EXTENDED RELEASE ORAL 2 TIMES DAILY
Qty: 20 TABLET | Refills: 0 | Status: SHIPPED | OUTPATIENT
Start: 2022-03-04 | End: 2022-09-16

## 2022-03-04 RX ORDER — NEBULIZER AND COMPRESSOR
1 EACH MISCELLANEOUS
Qty: 1 EACH | Refills: 0 | Status: SHIPPED | OUTPATIENT
Start: 2022-03-04

## 2022-03-04 RX ORDER — ALBUTEROL SULFATE 90 UG/1
2 AEROSOL, METERED RESPIRATORY (INHALATION)
Qty: 1 EACH | Refills: 1 | Status: SHIPPED | OUTPATIENT
Start: 2022-03-04

## 2022-03-04 RX ORDER — IPRATROPIUM BROMIDE AND ALBUTEROL SULFATE 2.5; .5 MG/3ML; MG/3ML
6 SOLUTION RESPIRATORY (INHALATION)
Status: COMPLETED | OUTPATIENT
Start: 2022-03-04 | End: 2022-03-04

## 2022-03-04 RX ORDER — ALBUTEROL SULFATE 1.25 MG/3ML
1.25 SOLUTION RESPIRATORY (INHALATION)
Qty: 50 EACH | Refills: 0 | Status: SHIPPED | OUTPATIENT
Start: 2022-03-04

## 2022-03-04 RX ORDER — IPRATROPIUM BROMIDE AND ALBUTEROL SULFATE 2.5; .5 MG/3ML; MG/3ML
3 SOLUTION RESPIRATORY (INHALATION)
Status: COMPLETED | OUTPATIENT
Start: 2022-03-04 | End: 2022-03-04

## 2022-03-04 RX ORDER — PREDNISONE 20 MG/1
60 TABLET ORAL
Status: COMPLETED | OUTPATIENT
Start: 2022-03-04 | End: 2022-03-04

## 2022-03-04 RX ADMIN — IPRATROPIUM BROMIDE AND ALBUTEROL SULFATE 6 ML: 2.5; .5 SOLUTION RESPIRATORY (INHALATION) at 02:11

## 2022-03-04 RX ADMIN — PREDNISONE 60 MG: 20 TABLET ORAL at 00:46

## 2022-03-04 RX ADMIN — IPRATROPIUM BROMIDE AND ALBUTEROL SULFATE 3 ML: 2.5; .5 SOLUTION RESPIRATORY (INHALATION) at 00:53

## 2022-03-04 NOTE — ED NOTES
Discharge instructions were given to the patient by Estefany Garza RN. The patient left the Emergency Department ambulatory, alert and oriented and in no acute distress with four prescriptions. The patient was encouraged to call or return to the ED for worsening issues or problems and was encouraged to schedule a follow up appointment for continuing care. The patient verbalized understanding of discharge instructions and prescriptions, all questions were answered. The patient has no further concerns at this time.

## 2022-03-04 NOTE — ED PROVIDER NOTES
71-year-old female with a history of asthma and migraines presents with wheeze for the last 3 days, nonproductive cough, runny nose, fever off and on. Feels that her breathing is getting worse. Denies pain, denies known sick contacts. Patient states she only has the albuterol at home not the nebulizer machine.            Past Medical History:   Diagnosis Date    Asthma     Delivery normal     Depression     Ectopic pregnancy     Miscarriage     Neurological disorder     migraines    Other ill-defined conditions(799.89)     ectopic pregnancy    Psychiatric disorder     anger        Past Surgical History:   Procedure Laterality Date    HX CHOLECYSTECTOMY      HX GYN      right oophetrecomy    HX HERNIA REPAIR  14    Open repair right inguinal hernia with mesh    HX ORTHOPAEDIC      Right arm/leg    IN ABDOMEN SURGERY PROC UNLISTED           Family History:   Problem Relation Age of Onset    Cancer Mother         breast    Hypertension Mother     Thyroid Disease Mother     Sickle Cell Trait Father     Asthma Sister     Asthma Brother     Asthma Sister     Asthma Sister     Asthma Sister     Asthma Sister     Asthma Sister     Asthma Brother     Asthma Brother     Asthma Brother     Asthma Brother     Sickle Cell Anemia Brother     Asthma Brother     Diabetes Paternal Grandmother        Social History     Socioeconomic History    Marital status:      Spouse name: Not on file    Number of children: Not on file    Years of education: Not on file    Highest education level: Not on file   Occupational History    Not on file   Tobacco Use    Smoking status: Former Smoker     Packs/day: 0.00     Types: Cigarettes     Quit date: 2016     Years since quittin.9    Smokeless tobacco: Never Used    Tobacco comment: social smoker   Substance and Sexual Activity    Alcohol use: Yes     Comment: occasionally    Drug use: No    Sexual activity: Yes     Partners: Male Birth control/protection: None   Other Topics Concern    Not on file   Social History Narrative    Not on file     Social Determinants of Health     Financial Resource Strain:     Difficulty of Paying Living Expenses: Not on file   Food Insecurity:     Worried About Running Out of Food in the Last Year: Not on file    Parker of Food in the Last Year: Not on file   Transportation Needs:     Lack of Transportation (Medical): Not on file    Lack of Transportation (Non-Medical): Not on file   Physical Activity:     Days of Exercise per Week: Not on file    Minutes of Exercise per Session: Not on file   Stress:     Feeling of Stress : Not on file   Social Connections:     Frequency of Communication with Friends and Family: Not on file    Frequency of Social Gatherings with Friends and Family: Not on file    Attends Tenriism Services: Not on file    Active Member of [address] [place] [address] [employer] or Organizations: Not on file    Attends Club or Organization Meetings: Not on file    Marital Status: Not on file   Intimate Partner Violence:     Fear of Current or Ex-Partner: Not on file    Emotionally Abused: Not on file    Physically Abused: Not on file    Sexually Abused: Not on file   Housing Stability:     Unable to Pay for Housing in the Last Year: Not on file    Number of [place] in the Last Year: Not on file    Unstable Housing in the Last Year: Not on file         ALLERGIES: Ibuprofen and Ibuprofen    Review of Systems   Constitutional: Positive for fever. Negative for chills and unexpected weight change. HENT: Positive for rhinorrhea. Negative for congestion and trouble swallowing. Eyes: Negative for discharge. Respiratory: Positive for cough, shortness of breath and wheezing. Negative for chest tightness. Cardiovascular: Negative. Negative for chest pain. Gastrointestinal: Negative. Negative for abdominal distention, abdominal pain, constipation, diarrhea and nausea. Endocrine: Negative. Genitourinary: Negative. Negative for difficulty urinating, dysuria, frequency and urgency. Musculoskeletal: Negative. Negative for arthralgias and myalgias. Skin: Negative. Negative for color change. Allergic/Immunologic: Negative. Neurological: Negative. Negative for dizziness, speech difficulty and headaches. Hematological: Negative. Psychiatric/Behavioral: Negative. Negative for agitation and confusion. All other systems reviewed and are negative. Vitals:    03/04/22 0030   BP: 126/85   Pulse: 91   Resp: 18   Temp: 99.4 °F (37.4 °C)   SpO2: 98%   Weight: 80.5 kg (177 lb 8 oz)   Height: 5' 8\" (1.727 m)            Physical Exam  Vitals and nursing note reviewed. Constitutional:       Appearance: She is well-developed. HENT:      Head: Normocephalic and atraumatic. Eyes:      Conjunctiva/sclera: Conjunctivae normal.   Cardiovascular:      Rate and Rhythm: Normal rate and regular rhythm. Pulmonary:      Effort: Pulmonary effort is normal. No respiratory distress. Comments: Decreased air movement bilaterally. Trace apical expiratory wheeze bilaterally. No increased work of breathing. Speaking easily in full sentences. Abdominal:      Palpations: Abdomen is soft. Tenderness: There is no abdominal tenderness. Musculoskeletal:         General: No deformity. Normal range of motion. Cervical back: Neck supple. Skin:     General: Skin is warm and dry. Neurological:      Mental Status: She is alert and oriented to person, place, and time. Psychiatric:         Behavior: Behavior normal.         Thought Content: Thought content normal.          MDM  Number of Diagnoses or Management Options    ED Course as of 03/04/22 0334   Fri Mar 04, 2022   0121 Still c/o wheezing after 1 duoneb [SS]   0246 Patient now clear to auscultation bilaterally.   Feeling well enough to go home [SS]      ED Course User Index  [SS] Xochilt Mcleod MD       Procedures    LABORATORY TESTS:  No results found for this or any previous visit (from the past 12 hour(s)). IMAGING RESULTS:  No orders to display       MEDICATIONS GIVEN:  Medications   predniSONE (DELTASONE) tablet 60 mg (60 mg Oral Given 3/4/22 0046)   albuterol-ipratropium (DUO-NEB) 2.5 MG-0.5 MG/3 ML (3 mL Nebulization Given 3/4/22 0053)   albuterol-ipratropium (DUO-NEB) 2.5 MG-0.5 MG/3 ML (6 mL Nebulization Given 3/4/22 0211)       IMPRESSION:  1. Upper respiratory tract infection, unspecified type    2. Mild intermittent asthma with acute exacerbation        PLAN:  1. Discharge Medication List as of 3/4/2022  2:48 AM      START taking these medications    Details   Nebulizer & Compressor machine 1 Each by Does Not Apply route every four (4) hours as needed for Wheezing. As directed, Normal, Disp-1 Each, R-0      albuterol (ACCUNEB) 1.25 mg/3 mL nebu Take 3 mL by inhalation every four (4) hours as needed for Wheezing (wheezing). , Normal, Disp-50 Each, R-0      !! albuterol (PROVENTIL HFA, VENTOLIN HFA, PROAIR HFA) 90 mcg/actuation inhaler Take 2 Puffs by inhalation every four (4) hours as needed for Wheezing., Normal, Disp-1 Each, R-1      predniSONE (DELTASONE) 20 mg tablet Take 60 mg by mouth daily for 5 days. , Normal, Disp-15 Tablet, R-0       !! - Potential duplicate medications found. Please discuss with provider. CONTINUE these medications which have CHANGED    Details   cetirizine-pseudoePHEDrine (ZyrTEC-D) 5-120 mg Tb12 Take 1 Tablet by mouth two (2) times a day., Normal, Disp-20 Tablet, R-0         CONTINUE these medications which have NOT CHANGED    Details   butalbital-acetaminophen-caffeine (FIORICET, ESGIC) -40 mg per tablet Take 1 Tablet by mouth every six (6) hours as needed for Headache.  Indications: a migraine headache, Normal, Disp-20 Tablet, R-0      !! albuterol (PROVENTIL HFA, VENTOLIN HFA, PROAIR HFA) 90 mcg/actuation inhaler Take 2 Puffs by inhalation every four (4) hours as needed for Wheezing., Normal, Disp-1 Inhaler, R-0      pseudoephedrine (Sudafed) 30 mg tablet Take 1 Tablet by mouth every four (4) hours as needed for Congestion. , Normal, Disp-15 Tablet, R-0      azithromycin (Zithromax Z-Low) 250 mg tablet 2 tabs by mouth day 1, then 1 tab by mouth daily on days 2-5, Normal, Disp-6 Tablet, R-0      !! acetaminophen (TYLENOL) 500 mg tablet Take 2 Tabs by mouth every six (6) hours as needed for Pain., Normal, Disp-20 Tab,R-0      methocarbamoL (Robaxin) 500 mg tablet Take 1 Tab by mouth four (4) times daily. , Normal, Disp-30 Tab,R-0      predniSONE (STERAPRED DS) 10 mg dose pack Use as directed, Normal, Disp-21 Tab,R-0      !! acetaminophen (Tylenol Extra Strength) 500 mg tablet Take 2 Tabs by mouth every six (6) hours as needed for Pain., Normal, Disp-20 Tab,R-0       !! - Potential duplicate medications found. Please discuss with provider.         2.   Follow-up Information     Follow up With Specialties Details Why [address] [facility] EMERGENCY DEPT Emergency Medicine  As needed, If symptoms worsen Mayda Melissa    [facility]  Schedule an appointment as soon as possible for a visit  As needed to establish primary care [address] [facility] [address]  [phone]        Return to ED if worse

## 2022-03-04 NOTE — ED NOTES
Pt presents ambulatory to ED with C/O subjective reports of SOB, wheezing, cough, nasal congestion and drainage and intermittent fevers x 3 days. Pt has been using at home inhalers without relief and states she no longer has access to a nebulizer. Denies exposure to COVID. NAD noted, pt able to speak in complete sentences, O2 saturations maintained on room air. Expiratory wheezes auscultated. Skin is warm and dry, A/O x 4. Emergency Department Nursing Plan of Care       The Nursing Plan of Care is developed from the Nursing assessment and Emergency Department Attending provider initial evaluation. The plan of care may be reviewed in the ED Provider note.     The Plan of Care was developed with the following considerations:   Patient / Family readiness to learn indicated by:verbalized understanding  Persons(s) to be included in education: patient  Barriers to Learning/Limitations:No    Signed     Dusty Lopez RN    3/4/2022   12:51 AM

## 2022-03-04 NOTE — ED TRIAGE NOTES
Pt presents to ED with c/o cough, nasal drainage, fever for 3 days. Pt reports using inhaler with no relief, hx asthma. Pt has not had medication within the past 6 hrs.

## 2022-04-07 ENCOUNTER — HOSPITAL ENCOUNTER (EMERGENCY)
Age: 37
Discharge: HOME | End: 2022-04-07
Attending: EMERGENCY MEDICINE
Payer: MEDICAID

## 2022-04-07 VITALS
HEART RATE: 80 BPM | BODY MASS INDEX: 26.22 KG/M2 | WEIGHT: 173 LBS | DIASTOLIC BLOOD PRESSURE: 84 MMHG | HEIGHT: 68 IN | OXYGEN SATURATION: 99 % | TEMPERATURE: 98.7 F | SYSTOLIC BLOOD PRESSURE: 151 MMHG | RESPIRATION RATE: 16 BRPM

## 2022-04-07 DIAGNOSIS — R39.9 URINARY TRACT INFECTION SYMPTOMS: Primary | ICD-10-CM

## 2022-04-07 DIAGNOSIS — R03.0 ELEVATED BLOOD PRESSURE READING: ICD-10-CM

## 2022-04-07 LAB
APPEARANCE UR: CLEAR
BACTERIA URNS QL MICRO: NEGATIVE /HPF
BILIRUB UR QL: NEGATIVE
COLOR UR: NORMAL
EPITH CASTS URNS QL MICRO: NORMAL /LPF
GLUCOSE UR STRIP.AUTO-MCNC: NEGATIVE MG/DL
HCG UR QL: NEGATIVE
HGB UR QL STRIP: NEGATIVE
KETONES UR QL STRIP.AUTO: NEGATIVE MG/DL
LEUKOCYTE ESTERASE UR QL STRIP.AUTO: NEGATIVE
NITRITE UR QL STRIP.AUTO: NEGATIVE
PH UR STRIP: 6 [PH] (ref 5–8)
PROT UR STRIP-MCNC: NEGATIVE MG/DL
RBC #/AREA URNS HPF: NORMAL /HPF (ref 0–5)
SP GR UR REFRACTOMETRY: 1.02 (ref 1–1.03)
UA: UC IF INDICATED,UAUC: NORMAL
UROBILINOGEN UR QL STRIP.AUTO: 1 EU/DL (ref 0.2–1)
WBC URNS QL MICRO: NORMAL /HPF (ref 0–4)

## 2022-04-07 PROCEDURE — 81025 URINE PREGNANCY TEST: CPT

## 2022-04-07 PROCEDURE — 99283 EMERGENCY DEPT VISIT LOW MDM: CPT

## 2022-04-07 PROCEDURE — 81001 URINALYSIS AUTO W/SCOPE: CPT

## 2022-04-07 PROCEDURE — 87086 URINE CULTURE/COLONY COUNT: CPT

## 2022-04-07 RX ORDER — CEPHALEXIN 500 MG/1
500 CAPSULE ORAL 2 TIMES DAILY
Qty: 14 CAPSULE | Refills: 0 | Status: SHIPPED | OUTPATIENT
Start: 2022-04-07 | End: 2022-04-14

## 2022-04-07 NOTE — ED NOTES
See triage note   Pt is alert and oriented x 4, RR even and unlabored, skin is warm and dry. Assessment completed and pt updated on plan of care. Call bell in reach. Emergency Department Nursing Plan of Care       The Nursing Plan of Care is developed from the Nursing assessment and Emergency Department Attending provider initial evaluation. The plan of care may be reviewed in the ED Provider note.     The Plan of Care was developed with the following considerations:   Patient / Family readiness to learn indicated by:verbalized understanding  Persons(s) to be included in education: patient  Barriers to Learning/Limitations:No    Signed     Kane Mak RN    4/7/2022   11:45 AM

## 2022-04-07 NOTE — ED PROVIDER NOTES
EMERGENCY DEPARTMENT HISTORY AND PHYSICAL EXAM      Date: 4/7/2022  Patient Name: Rajiv Levy    History of Presenting Illness     Chief Complaint   Patient presents with    Urgency    Urinary Pain       History Provided By: Patient    HPI: Rajiv Levy, 29 y.o. female with asthma, HTN, depression, migraine headaches, per patient and record review, presents ambulatory to the ED for evaluation of mild, urinary urgency and dysuria for the past 3 days. Also endorses mild, intermittent right lower back pain. States she has a history of UTIs, and symptoms feel similar. States the back pain is a mild aching pain, worse with certain movements, but denies decrease in range of motion. Otherwise does not radiate. Denies any trauma or injuries. Denies hematuria, vaginal discharge or bleeding. States her last menstrual period was several weeks ago, this is normal for her. Denies any concern for STDs or STIs. Denies any rashes or lesions of the groin. Denies any bowel or bladder incontinence/retention, or saddle anesthesias. Denies any extremity numbness, weakness, or tingling. Denies any difficulty ambulating or changes in gait. No prior history of back surgeries. Denies night sweats or weight loss. Denies history of kidney stones. Denies fevers or chills, headache, dizziness, sore throat, cough, CP, SOB, abdominal pain, N/V/D, blood in urine or stool, rashes, weakness or LOC. Has not taken any medications or attempted any symptomatic management techniques prior to arrival. No other complaints at this time. There are no other complaints, changes, or physical findings at this time. PCP: None    No current facility-administered medications on file prior to encounter. Current Outpatient Medications on File Prior to Encounter   Medication Sig Dispense Refill    Nebulizer & Compressor machine 1 Each by Does Not Apply route every four (4) hours as needed for Wheezing.  As directed 1 Each 0  albuterol (ACCUNEB) 1.25 mg/3 mL nebu Take 3 mL by inhalation every four (4) hours as needed for Wheezing (wheezing). 50 Each 0    albuterol (PROVENTIL HFA, VENTOLIN HFA, PROAIR HFA) 90 mcg/actuation inhaler Take 2 Puffs by inhalation every four (4) hours as needed for Wheezing. 1 Each 1    cetirizine-pseudoePHEDrine (ZyrTEC-D) 5-120 mg Tb12 Take 1 Tablet by mouth two (2) times a day. 20 Tablet 0    pseudoephedrine (Sudafed) 30 mg tablet Take 1 Tablet by mouth every four (4) hours as needed for Congestion. 15 Tablet 0    butalbital-acetaminophen-caffeine (FIORICET, ESGIC) -40 mg per tablet Take 1 Tablet by mouth every six (6) hours as needed for Headache. Indications: a migraine headache 20 Tablet 0    azithromycin (Zithromax Z-Low) 250 mg tablet 2 tabs by mouth day 1, then 1 tab by mouth daily on days 2-5 (Patient not taking: Reported on 3/4/2022) 6 Tablet 0    acetaminophen (TYLENOL) 500 mg tablet Take 2 Tabs by mouth every six (6) hours as needed for Pain. (Patient not taking: Reported on 7/28/2021) 20 Tab 0    methocarbamoL (Robaxin) 500 mg tablet Take 1 Tab by mouth four (4) times daily. (Patient not taking: Reported on 7/28/2021) 30 Tab 0    predniSONE (STERAPRED DS) 10 mg dose pack Use as directed (Patient not taking: Reported on 7/28/2021) 21 Tab 0    acetaminophen (Tylenol Extra Strength) 500 mg tablet Take 2 Tabs by mouth every six (6) hours as needed for Pain. 20 Tab 0    albuterol (PROVENTIL HFA, VENTOLIN HFA, PROAIR HFA) 90 mcg/actuation inhaler Take 2 Puffs by inhalation every four (4) hours as needed for Wheezing.  1 Inhaler 0       Past History     Past Medical History:  Past Medical History:   Diagnosis Date    Asthma     Delivery normal     Depression     Ectopic pregnancy     Hypertension     not taking medications    Miscarriage     Neurological disorder     migraines    Other ill-defined conditions(129.89)     ectopic pregnancy    Psychiatric disorder     anger Past Surgical History:  Past Surgical History:   Procedure Laterality Date    HX CHOLECYSTECTOMY      HX GYN      right oophetrecomy    HX HERNIA REPAIR  15    Open repair right inguinal hernia with mesh    HX ORTHOPAEDIC      Right arm/leg    CO ABDOMEN SURGERY PROC UNLISTED         Family History:  Family History   Problem Relation Age of Onset   Reyna Pro Cancer Mother         breast    Hypertension Mother     Thyroid Disease Mother     Sickle Cell Trait Father     Asthma Sister     Asthma Brother     Asthma Sister     Asthma Sister     Asthma Sister     Asthma Sister     Asthma Sister     Asthma Brother     Asthma Brother     Asthma Brother     Asthma Brother     Sickle Cell Anemia Brother     Asthma Brother     Diabetes Paternal Grandmother        Social History:  Social History     Tobacco Use    Smoking status: Former Smoker     Packs/day: 0.00     Types: Cigarettes     Quit date: 2016     Years since quittin.0    Smokeless tobacco: Never Used    Tobacco comment: social smoker   Substance Use Topics    Alcohol use: Yes     Comment: occasionally    Drug use: No       Allergies: Allergies   Allergen Reactions    Ibuprofen Hives and Itching    Ibuprofen Itching         Review of Systems   Review of Systems   Constitutional: Negative for appetite change, chills and fever. HENT: Negative for congestion. Eyes: Negative for pain. Respiratory: Negative for cough and shortness of breath. Cardiovascular: Negative for chest pain. Gastrointestinal: Negative for abdominal pain, constipation, diarrhea, nausea and vomiting. Endocrine: Negative for polydipsia, polyphagia and polyuria. Genitourinary: Positive for dysuria, flank pain and urgency. Negative for decreased urine volume, difficulty urinating, frequency, genital sores, hematuria, menstrual problem, pelvic pain, vaginal bleeding, vaginal discharge and vaginal pain.    Musculoskeletal: Negative for gait problem, neck pain and neck stiffness. Skin: Negative for rash. Neurological: Negative for syncope, weakness, numbness and headaches. All other systems reviewed and are negative. Physical Exam   Physical Exam  Vitals and nursing note reviewed. Constitutional:       General: She is not in acute distress. Appearance: Normal appearance. She is normal weight. She is not ill-appearing, toxic-appearing or diaphoretic. Comments: 29 y.o. female   HENT:      Head: Normocephalic and atraumatic. Nose: Nose normal.      Mouth/Throat:      Mouth: Mucous membranes are moist.   Eyes:      Extraocular Movements: Extraocular movements intact. Conjunctiva/sclera: Conjunctivae normal.   Cardiovascular:      Rate and Rhythm: Normal rate and regular rhythm. Heart sounds: Normal heart sounds. No murmur heard. No friction rub. No gallop. Pulmonary:      Effort: Pulmonary effort is normal. No respiratory distress. Breath sounds: Normal breath sounds. No wheezing. Abdominal:      General: There is no distension. Palpations: Abdomen is soft. There is no mass. Tenderness: There is no abdominal tenderness. There is no right CVA tenderness, left CVA tenderness or guarding. Genitourinary:     Comments: Differs  exam     Musculoskeletal:         General: Normal range of motion. Cervical back: Normal range of motion. Thoracic back: Normal. No tenderness. Normal range of motion. Lumbar back: Normal. No tenderness or bony tenderness. Normal range of motion. Comments: Non-tender, no midline tenderness, no point tenderness, no palpable deformities. Good ROM  Distal sensation intact and equal   Strong DP and PT pulses bilaterally. Observed ambulate with normal gait. Skin:     General: Skin is warm and dry. Neurological:      General: No focal deficit present. Mental Status: She is alert and oriented to person, place, and time. Motor: No weakness. Coordination: Coordination normal.      Gait: Gait normal.      Deep Tendon Reflexes: Reflexes normal.   Psychiatric:         Mood and Affect: Mood normal.         Behavior: Behavior normal.         Diagnostic Study Results     Labs -     Recent Results (from the past 12 hour(s))   URINALYSIS W/ REFLEX CULTURE    Collection Time: 04/07/22  5:10 PM    Specimen: Urine   Result Value Ref Range    Color YELLOW/STRAW      Appearance CLEAR CLEAR      Specific gravity 1.025 1.003 - 1.030      pH (UA) 6.0 5.0 - 8.0      Protein Negative NEG mg/dL    Glucose Negative NEG mg/dL    Ketone Negative NEG mg/dL    Bilirubin Negative NEG      Blood Negative NEG      Urobilinogen 1.0 0.2 - 1.0 EU/dL    Nitrites Negative NEG      Leukocyte Esterase Negative NEG      WBC 0-4 0 - 4 /hpf    RBC 0-5 0 - 5 /hpf    Epithelial cells FEW FEW /lpf    Bacteria Negative NEG /hpf    UA:UC IF INDICATED CULTURE NOT INDICATED BY UA RESULT CNI     HCG URINE, QL. - POC    Collection Time: 04/07/22  5:15 PM   Result Value Ref Range    Pregnancy test,urine (POC) Negative NEG         Radiologic Studies -   No orders to display     CT Results  (Last 48 hours)    None        CXR Results  (Last 48 hours)    None            Medical Decision Making   I am the first provider for this patient. I reviewed the vital signs, available nursing notes, past medical history, past surgical history, family history and social history. Vital Signs-Reviewed the patient's vital signs. Patient Vitals for the past 12 hrs:   Temp Pulse Resp BP SpO2   04/07/22 1713 -- -- -- -- 99 %   04/07/22 1646 98.7 °F (37.1 °C) 80 16 (!) 151/84 99 %       Records Reviewed: Nursing Notes and Old Medical Records    Provider Notes (Medical Decision Making):   Patient is a very pleasant 30-year-old female presents ED for evaluation of urinary symptoms and right localized flank pain for the past several days as noted above. States symptoms are characteristic of prior UTIs.     Patient differs labs/imaging/further work-up at this time. Denies concern for STDs or STIs, differs STD testing. Benign abdominal exam. Low back pain is mild, intermittent, no neurological deficits ( low suspicion for vascular etiology such as aortic aneurysm, aortic dissection, mesenteric ischemia, epidural abscess, osteomyelitis, meningitis, metastatic cancer, or other emergent conditions going further evaluation/management acutely at this time). Shared decision making performed and care plan created together, discussed work-up and imaging results, diagnosis and treatment plan -- Although urinalysis without obvious UTI; urine culture sent; given symptoms will treat (discussed with patient, elects antibiotic treatment at this time). Counseled symptomatic management techniques. PCP and urology follow-up. Verbal return precautions advised. Patient verbalizes understanding and agreement of current plan of care. ED Course:   Initial assessment performed. The patients presenting problems have been discussed, and they are in agreement with the care plan formulated and outlined with them. I have encouraged them to ask questions as they arise throughout their visit. Critical Care Time: None    Disposition:  Discharge     PLAN:  1. Discharge Medication List as of 4/7/2022  5:34 PM      START taking these medications    Details   cephALEXin (Keflex) 500 mg capsule Take 1 Capsule by mouth two (2) times a day for 7 days. , Normal, Disp-14 Capsule, R-0         CONTINUE these medications which have NOT CHANGED    Details   Nebulizer & Compressor machine 1 Each by Does Not Apply route every four (4) hours as needed for Wheezing. As directed, Normal, Disp-1 Each, R-0      albuterol (ACCUNEB) 1.25 mg/3 mL nebu Take 3 mL by inhalation every four (4) hours as needed for Wheezing (wheezing). , Normal, Disp-50 Each, R-0      !! albuterol (PROVENTIL HFA, VENTOLIN HFA, PROAIR HFA) 90 mcg/actuation inhaler Take 2 Puffs by inhalation every four (4) hours as needed for Wheezing., Normal, Disp-1 Each, R-1      cetirizine-pseudoePHEDrine (ZyrTEC-D) 5-120 mg Tb12 Take 1 Tablet by mouth two (2) times a day., Normal, Disp-20 Tablet, R-0      pseudoephedrine (Sudafed) 30 mg tablet Take 1 Tablet by mouth every four (4) hours as needed for Congestion. , Normal, Disp-15 Tablet, R-0      butalbital-acetaminophen-caffeine (FIORICET, ESGIC) -40 mg per tablet Take 1 Tablet by mouth every six (6) hours as needed for Headache. Indications: a migraine headache, Normal, Disp-20 Tablet, R-0      azithromycin (Zithromax Z-Low) 250 mg tablet 2 tabs by mouth day 1, then 1 tab by mouth daily on days 2-5, Normal, Disp-6 Tablet, R-0      !! acetaminophen (TYLENOL) 500 mg tablet Take 2 Tabs by mouth every six (6) hours as needed for Pain., Normal, Disp-20 Tab,R-0      methocarbamoL (Robaxin) 500 mg tablet Take 1 Tab by mouth four (4) times daily. , Normal, Disp-30 Tab,R-0      predniSONE (STERAPRED DS) 10 mg dose pack Use as directed, Normal, Disp-21 Tab,R-0      !! acetaminophen (Tylenol Extra Strength) 500 mg tablet Take 2 Tabs by mouth every six (6) hours as needed for Pain., Normal, Disp-20 Tab,R-0      !! albuterol (PROVENTIL HFA, VENTOLIN HFA, PROAIR HFA) 90 mcg/actuation inhaler Take 2 Puffs by inhalation every four (4) hours as needed for Wheezing., Normal, Disp-1 Inhaler, R-0       !! - Potential duplicate medications found. Please discuss with provider.         2.   Follow-up Information     Follow up With Specialties Details Why [address] [facility] EMERGENCY DEPT Emergency Medicine  As needed, If symptoms worsen [address] [facility]  In 1 week  [address], [address] [facility] [address]    [facility] In 1 week As needed, If symptoms worsen [address] [address]  [phone]        Return to ED if worse     Diagnosis     Clinical Impression:   1. Urinary tract infection symptoms    2. Elevated blood pressure reading          Please note that this dictation was completed with Get [employer], the computer voice recognition software. Quite often unanticipated grammatical, syntax, homophones, and other interpretive errors are inadvertently transcribed by the computer software. Please disregards these errors. Please excuse any errors that have escaped final proofreading.

## 2022-04-08 LAB
BACTERIA SPEC CULT: NORMAL
SERVICE CMNT-IMP: NORMAL

## 2022-08-31 ENCOUNTER — HOSPITAL ENCOUNTER (EMERGENCY)
Age: 37
Discharge: HOME | End: 2022-08-31
Attending: EMERGENCY MEDICINE
Payer: MEDICAID

## 2022-08-31 VITALS
WEIGHT: 175 LBS | RESPIRATION RATE: 18 BRPM | TEMPERATURE: 98.6 F | HEART RATE: 90 BPM | HEIGHT: 68 IN | SYSTOLIC BLOOD PRESSURE: 142 MMHG | BODY MASS INDEX: 26.52 KG/M2 | OXYGEN SATURATION: 98 % | DIASTOLIC BLOOD PRESSURE: 92 MMHG

## 2022-08-31 DIAGNOSIS — G89.29 CHRONIC BILATERAL LOW BACK PAIN WITHOUT SCIATICA: Primary | ICD-10-CM

## 2022-08-31 DIAGNOSIS — M54.50 CHRONIC BILATERAL LOW BACK PAIN WITHOUT SCIATICA: Primary | ICD-10-CM

## 2022-08-31 PROCEDURE — 74011636637 HC RX REV CODE- 636/637: Performed by: PHYSICIAN ASSISTANT

## 2022-08-31 PROCEDURE — 74011250637 HC RX REV CODE- 250/637: Performed by: PHYSICIAN ASSISTANT

## 2022-08-31 PROCEDURE — 99283 EMERGENCY DEPT VISIT LOW MDM: CPT

## 2022-08-31 RX ORDER — LIDOCAINE 50 MG/G
PATCH TOPICAL
Qty: 15 EACH | Refills: 0 | OUTPATIENT
Start: 2022-08-31 | End: 2022-09-16

## 2022-08-31 RX ORDER — HYDROCODONE BITARTRATE AND ACETAMINOPHEN 5; 325 MG/1; MG/1
1 TABLET ORAL
Status: COMPLETED | OUTPATIENT
Start: 2022-08-31 | End: 2022-08-31

## 2022-08-31 RX ORDER — PREDNISONE 20 MG/1
60 TABLET ORAL
Status: COMPLETED | OUTPATIENT
Start: 2022-08-31 | End: 2022-08-31

## 2022-08-31 RX ORDER — PREDNISONE 10 MG/1
TABLET ORAL
Qty: 48 TABLET | Refills: 0 | OUTPATIENT
Start: 2022-08-31 | End: 2022-09-16

## 2022-08-31 RX ADMIN — HYDROCODONE BITARTRATE AND ACETAMINOPHEN 1 TABLET: 5; 325 TABLET ORAL at 13:28

## 2022-08-31 RX ADMIN — PREDNISONE 60 MG: 20 TABLET ORAL at 13:28

## 2022-08-31 NOTE — ED NOTES
Emergency Department Nursing Plan of Care       The Nursing Plan of Care is developed from the Nursing assessment and Emergency Department Attending provider initial evaluation. The plan of care may be reviewed in the ED Provider note.     The Plan of Care was developed with the following considerations:   Patient / Family readiness to learn indicated by:verbalized understanding  Persons(s) to be included in education: patient  Barriers to Learning/Limitations:No    Signed     Shadi Fagan RN    8/31/2022   1:32 PM

## 2022-08-31 NOTE — ED PROVIDER NOTES
EMERGENCY DEPARTMENT HISTORY AND PHYSICAL EXAM      Date: 8/31/2022  Patient Name: Henry Trivedi    History of Presenting Illness     Chief Complaint   Patient presents with    Back Pain     History Provided By: Patient    HPI: Henry Trivedi, 29 y.o. female with medical history significant for depression, asthma, migraine headaches, hypertension who presents via self to the ED with cc of acute on chronic moderate aching bilateral low back pain X 3 weeks. No known injury or trauma. History of similar back pain in the past secondary to spinal arthritis. Over-the-counter analgesics including Tylenol and ibuprofen without relief. Last dose of ibuprofen was this morning. Pain is exacerbated with movement and bending. Additionally endorses taking tramadol and muscle relaxers without relief. Has a follow-up in 2 weeks for her chronic pain. No fever, chills, nausea, vomiting, numbness, tingling, focal weakness, saddle paresthesias, incontinence, urinary tension, gait abnormalities, abdominal pain, chest pain, shortness of breath, wheezing. PCP: None    There are no other complaints, changes, or physical findings at this time. No current facility-administered medications on file prior to encounter. Current Outpatient Medications on File Prior to Encounter   Medication Sig Dispense Refill    Nebulizer & Compressor machine 1 Each by Does Not Apply route every four (4) hours as needed for Wheezing. As directed 1 Each 0    albuterol (ACCUNEB) 1.25 mg/3 mL nebu Take 3 mL by inhalation every four (4) hours as needed for Wheezing (wheezing). 50 Each 0    albuterol (PROVENTIL HFA, VENTOLIN HFA, PROAIR HFA) 90 mcg/actuation inhaler Take 2 Puffs by inhalation every four (4) hours as needed for Wheezing. 1 Each 1    cetirizine-pseudoePHEDrine (ZyrTEC-D) 5-120 mg Tb12 Take 1 Tablet by mouth two (2) times a day.  20 Tablet 0    pseudoephedrine (Sudafed) 30 mg tablet Take 1 Tablet by mouth every four (4) hours as needed for Congestion. 15 Tablet 0    butalbital-acetaminophen-caffeine (FIORICET, ESGIC) -40 mg per tablet Take 1 Tablet by mouth every six (6) hours as needed for Headache. Indications: a migraine headache 20 Tablet 0    [DISCONTINUED] azithromycin (Zithromax Z-Low) 250 mg tablet 2 tabs by mouth day 1, then 1 tab by mouth daily on days 2-5 (Patient not taking: Reported on 3/4/2022) 6 Tablet 0    acetaminophen (TYLENOL) 500 mg tablet Take 2 Tabs by mouth every six (6) hours as needed for Pain. (Patient not taking: No sig reported) 20 Tab 0    methocarbamoL (Robaxin) 500 mg tablet Take 1 Tab by mouth four (4) times daily. (Patient not taking: No sig reported) 30 Tab 0    acetaminophen (Tylenol Extra Strength) 500 mg tablet Take 2 Tabs by mouth every six (6) hours as needed for Pain. 20 Tab 0    [DISCONTINUED] predniSONE (STERAPRED DS) 10 mg dose pack Use as directed (Patient not taking: Reported on 7/28/2021) 21 Tab 0    albuterol (PROVENTIL HFA, VENTOLIN HFA, PROAIR HFA) 90 mcg/actuation inhaler Take 2 Puffs by inhalation every four (4) hours as needed for Wheezing.  1 Inhaler 0     Past History     Past Medical History:  Past Medical History:   Diagnosis Date    Asthma     Delivery normal     Depression     Ectopic pregnancy     Hypertension     not taking medications    Miscarriage     Neurological disorder     migraines    Other ill-defined conditions(799.89)     ectopic pregnancy    Psychiatric disorder     anger      Past Surgical History:  Past Surgical History:   Procedure Laterality Date    HX CHOLECYSTECTOMY      HX GYN      right oophetrecomy    HX HERNIA REPAIR  12/5/14    Open repair right inguinal hernia with mesh    HX ORTHOPAEDIC      Right arm/leg    NM ABDOMEN SURGERY PROC UNLISTED       Family History:  Family History   Problem Relation Age of Onset    Cancer Mother         breast    Hypertension Mother     Thyroid Disease Mother     Sickle Cell Trait Father     Asthma Sister     Asthma Brother     Asthma Sister     Asthma Sister     Asthma Sister     Asthma Sister     Asthma Sister     Asthma Brother     Asthma Brother     Asthma Brother     Asthma Brother     Sickle Cell Anemia Brother     Asthma Brother     Diabetes Paternal Grandmother      Social History:  Social History     Tobacco Use    Smoking status: Former     Packs/day: 0.00     Types: Cigarettes     Quit date: 2016     Years since quittin.4    Smokeless tobacco: Never    Tobacco comments:     social smoker   Substance Use Topics    Alcohol use: Yes     Comment: occasionally    Drug use: No     Allergies: Allergies   Allergen Reactions    Ibuprofen Hives and Itching    Ibuprofen Itching     Review of Systems   Review of Systems   Constitutional:  Negative for activity change, appetite change, chills, fatigue and fever. HENT: Negative. Eyes: Negative. Respiratory:  Negative for cough and shortness of breath. Cardiovascular: Negative. Negative for chest pain. Gastrointestinal: Negative. Negative for abdominal pain, constipation, diarrhea, nausea and vomiting. Genitourinary: Negative. Negative for difficulty urinating, dysuria, flank pain, frequency and pelvic pain. Musculoskeletal:  Positive for back pain. Negative for arthralgias, gait problem, joint swelling, myalgias and neck pain. Skin: Negative. Negative for rash and wound. Neurological: Negative. Negative for seizures, speech difficulty, weakness, numbness and headaches. Psychiatric/Behavioral: Negative. Physical Exam   Physical Exam  Vitals and nursing note reviewed. Constitutional:       General: She is not in acute distress. Appearance: Normal appearance. She is well-developed. She is not ill-appearing, toxic-appearing or diaphoretic. HENT:      Head: Normocephalic and atraumatic.       Right Ear: External ear normal.      Left Ear: External ear normal.      Nose: Nose normal.   Eyes:      Conjunctiva/sclera: Conjunctivae normal. Pupils: Pupils are equal, round, and reactive to light. Cardiovascular:      Rate and Rhythm: Normal rate and regular rhythm. Pulmonary:      Effort: Pulmonary effort is normal.      Breath sounds: Normal breath sounds. Abdominal:      Palpations: Abdomen is soft. Tenderness: There is no abdominal tenderness. There is no guarding. Musculoskeletal:         General: No deformity. Normal range of motion. Cervical back: Normal, normal range of motion and neck supple. Thoracic back: Normal.      Lumbar back: Tenderness present. No swelling, edema, deformity, signs of trauma, lacerations, spasms or bony tenderness. Normal range of motion. Negative right straight leg raise test and negative left straight leg raise test. No scoliosis. Right lower leg: No edema. Left lower leg: No edema. Comments: No crepitus, deformity, step-off, edema, instability, spinal tenderness to palpation. Full range of motion of spine as well as extremities. Neurovascular intact bilateral extremities. Normal cremasteric reflex, thigh adduction, knee extension, ankle dorsiflexion, plantar flexion of toes, knee reflex, knee flexion. Skin:     General: Skin is warm and dry. Findings: No abscess, erythema, rash or wound. Neurological:      General: No focal deficit present. Mental Status: She is alert and oriented to person, place, and time. Cranial Nerves: No cranial nerve deficit. Psychiatric:         Behavior: Behavior normal.         Thought Content: Thought content normal.         Judgment: Judgment normal.   98  Diagnostic Study Results   Labs -   No results found for this or any previous visit (from the past 12 hour(s)). Radiologic Studies -   No orders to display     No results found. Medical Decision Making   I am the first provider for this patient.     I reviewed the vital signs, available nursing notes, past medical history, past surgical history, family history and social history. Vital Signs-Reviewed the patient's vital signs. Patient Vitals for the past 24 hrs:   Temp Pulse Resp BP SpO2   08/31/22 1240 98.6 °F (37 °C) 90 18 (!) 142/92 98 %     Pulse Oximetry Analysis - 98% on RA (normal)      Records Reviewed: Nursing Notes, Old Medical Records, Previous Radiology Studies, and Previous Laboratory Studies    Provider Notes (Medical Decision Making): The patient complains of low back pain. These symptoms are consistent with a lumbar strain. Less likely  pathology, aortic dissection or ruptured AAA, or cauda equina given that there are no red flags and a benign physical exam.  No significant alcohol abuse, diabetes, renal failure, night pain, multiple visits, IV drug use, fever, recent infection, immunosuppression, recent spinal fracture procedure, incontinence or retention, indwelling urinary catheter. I have recommended rest, avoiding heavy lifting until better, use of intermittent heat (avoid sleeping on a heating pad), and use of OTC NSAID's (Advil, Aleve etc) or Tylenol prn for pain. Call PCP if back pain persists or she develops leg symptoms. It has also been explained that this may take up to three months to fully resolved and that smoking may slow that process even more. ED Course:   Initial assessment performed. The patients presenting problems have been discussed, and they are in agreement with the care plan formulated and outlined with them. I have encouraged them to ask questions as they arise throughout their visit. Progress Note:   Updated pt on all returned results and findings. Discussed the importance of proper follow up as referred below along with return precautions. Pt in agreement with the care plan and expresses agreement with and understanding of all items discussed. Disposition:  1330  I have discussed with patient their diagnosis, treatment, and follow up plan.  The patient agrees to follow up as outlined in discharge paperwork and also to return to the ED with any worsening. Ara Brown PA-C      PLAN:  1. Discharge Medication List as of 8/31/2022  1:24 PM        START taking these medications    Details   lidocaine (LIDODERM) 5 % Apply patch to the affected area for 12 hours a day and remove for 12 hours a day., Normal, Disp-15 Each, R-0           CONTINUE these medications which have CHANGED    Details   predniSONE (STERAPRED DS) 10 mg dose pack Take as directed in dosepack, Normal, Disp-48 Tablet, R-0           CONTINUE these medications which have NOT CHANGED    Details   Nebulizer & Compressor machine 1 Each by Does Not Apply route every four (4) hours as needed for Wheezing. As directed, Normal, Disp-1 Each, R-0      albuterol (ACCUNEB) 1.25 mg/3 mL nebu Take 3 mL by inhalation every four (4) hours as needed for Wheezing (wheezing). , Normal, Disp-50 Each, R-0      !! albuterol (PROVENTIL HFA, VENTOLIN HFA, PROAIR HFA) 90 mcg/actuation inhaler Take 2 Puffs by inhalation every four (4) hours as needed for Wheezing., Normal, Disp-1 Each, R-1      cetirizine-pseudoePHEDrine (ZyrTEC-D) 5-120 mg Tb12 Take 1 Tablet by mouth two (2) times a day., Normal, Disp-20 Tablet, R-0      pseudoephedrine (Sudafed) 30 mg tablet Take 1 Tablet by mouth every four (4) hours as needed for Congestion. , Normal, Disp-15 Tablet, R-0      butalbital-acetaminophen-caffeine (FIORICET, ESGIC) -40 mg per tablet Take 1 Tablet by mouth every six (6) hours as needed for Headache. Indications: a migraine headache, Normal, Disp-20 Tablet, R-0      !! acetaminophen (TYLENOL) 500 mg tablet Take 2 Tabs by mouth every six (6) hours as needed for Pain., Normal, Disp-20 Tab,R-0      methocarbamoL (Robaxin) 500 mg tablet Take 1 Tab by mouth four (4) times daily. , Normal, Disp-30 Tab,R-0      !! acetaminophen (Tylenol Extra Strength) 500 mg tablet Take 2 Tabs by mouth every six (6) hours as needed for Pain., Normal, Disp-20 Tab,R-0      !! albuterol (PROVENTIL HFA, VENTOLIN HFA, PROAIR HFA) 90 mcg/actuation inhaler Take 2 Puffs by inhalation every four (4) hours as needed for Wheezing., Normal, Disp-1 Inhaler, R-0       !! - Potential duplicate medications found. Please discuss with provider. STOP taking these medications       azithromycin (Zithromax Z-Low) 250 mg tablet Comments:   Reason for Stoppin.   Follow-up Information       Follow up With Specialties Details Why Tamela Cavazos Adityahansa  Schedule an appointment as soon as possible for a visit  As needed Der[facility]  [phone]    [facility] EMERGENCY DEPT Emergency Medicine Go to  As needed [address]  [place] [address] [facility]          Return to ED if worse     Diagnosis     Clinical Impression:   1. Chronic bilateral low back pain without sciatica            Please note that this dictation was completed with Dragon, computer voice recognition software. Quite often unanticipated grammatical, syntax, homophones, and other interpretive errors are inadvertently transcribed by the computer software. Please disregard these errors. Additionally, please excuse any errors that have escaped final proofreading.

## 2022-08-31 NOTE — ED TRIAGE NOTES
Patient presents to the ED with c/o lower back pain x2 weeks. Pt reports a lot of bending. Pt reports taking tramadol and a muscle relaxer but has run out of the tramadol. Pt reports a hx of arthritis. Pt reports using icy hot.

## 2022-08-31 NOTE — ED NOTES
Patient has been instructed that they have been given hydrocodone APAP which contains opioids, benzodiazepines, or other sedating drugs. Patient is aware that they  will need to refrain from driving or operating heavy machinery after taking this medication. Patient also instructed that they need to avoid drinking alcohol and using other products containing opioids, benzodiazepines, or other sedating drugs. Patient verbalized understanding.

## 2022-08-31 NOTE — DISCHARGE INSTRUCTIONS
It was a pleasure taking care of you at [facility] Emergency Department today. We know that when you come to [facility], you are entrusting us with your health, comfort, and safety. Our physicians and nurses honor that trust, and we truly appreciate the opportunity to care for you and your loved ones. We also value our feedback. If you receive a survey about your Emergency Department experience today, please fill it out. We care about our patients' feedback, and we listen to what you have to say. Thank you!

## 2022-09-16 ENCOUNTER — HOSPITAL ENCOUNTER (EMERGENCY)
Age: 37
Discharge: HOME | End: 2022-09-16
Attending: EMERGENCY MEDICINE
Payer: MEDICAID

## 2022-09-16 VITALS
HEART RATE: 104 BPM | OXYGEN SATURATION: 100 % | RESPIRATION RATE: 18 BRPM | BODY MASS INDEX: 26.37 KG/M2 | WEIGHT: 174 LBS | SYSTOLIC BLOOD PRESSURE: 149 MMHG | HEIGHT: 68 IN | DIASTOLIC BLOOD PRESSURE: 79 MMHG | TEMPERATURE: 99.2 F

## 2022-09-16 DIAGNOSIS — J06.9 ACUTE URI: ICD-10-CM

## 2022-09-16 DIAGNOSIS — H66.002 NON-RECURRENT ACUTE SUPPURATIVE OTITIS MEDIA OF LEFT EAR WITHOUT SPONTANEOUS RUPTURE OF TYMPANIC MEMBRANE: Primary | ICD-10-CM

## 2022-09-16 LAB
SARS-COV-2, XPLCVT: NOT DETECTED
SOURCE, COVRS: NORMAL

## 2022-09-16 PROCEDURE — 99283 EMERGENCY DEPT VISIT LOW MDM: CPT

## 2022-09-16 PROCEDURE — 74011250637 HC RX REV CODE- 250/637: Performed by: NURSE PRACTITIONER

## 2022-09-16 PROCEDURE — U0005 INFEC AGEN DETEC AMPLI PROBE: HCPCS

## 2022-09-16 RX ORDER — CETIRIZINE HYDROCHLORIDE, PSEUDOEPHEDRINE HYDROCHLORIDE 5; 120 MG/1; MG/1
1 TABLET, FILM COATED, EXTENDED RELEASE ORAL 2 TIMES DAILY
Qty: 20 TABLET | Refills: 0 | Status: SHIPPED | OUTPATIENT
Start: 2022-09-16

## 2022-09-16 RX ORDER — OXYMETAZOLINE HCL 0.05 %
2 SPRAY, NON-AEROSOL (ML) NASAL ONCE
Status: COMPLETED | OUTPATIENT
Start: 2022-09-16 | End: 2022-09-16

## 2022-09-16 RX ORDER — AMOXICILLIN AND CLAVULANATE POTASSIUM 875; 125 MG/1; MG/1
1 TABLET, FILM COATED ORAL 2 TIMES DAILY
Qty: 14 TABLET | Refills: 0 | Status: SHIPPED | OUTPATIENT
Start: 2022-09-16

## 2022-09-16 RX ORDER — GUAIFENESIN 600 MG/1
600 TABLET, EXTENDED RELEASE ORAL 2 TIMES DAILY
Qty: 14 TABLET | Refills: 0 | Status: SHIPPED | OUTPATIENT
Start: 2022-09-16

## 2022-09-16 RX ADMIN — OXYMETAZOLINE HCL 2 SPRAY: 0.05 SPRAY NASAL at 14:56

## 2022-09-16 NOTE — ED NOTES
Discharge instructions were given to the patient by Vy Gonzalez RN. The patient left the Emergency Department ambulatory, alert and oriented and in no acute distress with 3 prescriptions. The patient was encouraged to call or return to the ED for worsening issues or problems and was encouraged to schedule a follow up appointment for continuing care. The patient verbalized understanding of discharge instructions and prescriptions, all questions were answered. The patient has no further concerns at this time.

## 2022-09-16 NOTE — DISCHARGE INSTRUCTIONS
It was a pleasure taking care of you at [facility] Emergency Department today. We know that when you come to [facility], you are entrusting us with your health, comfort, and safety. Our physicians and nurses honor that trust, and we truly appreciate the opportunity to care for you and your loved ones. We also value our feedback. If you receive a survey about your Emergency Department experience today, please fill it out. We care about our patients' feedback, and we listen to what you have to say. Thank you!

## 2022-09-16 NOTE — Clinical Note
[facility] EMERGENCY DEPT  [address] [address] [phone]    Work/School Note    Date: 9/16/2022     To Whom It May concern:    Eugenia Allen was evaluated by the following provider(s):  Attending Provider: Agustina Healy MD  Nurse Practitioner: Shaji Che NP.   Loni Dacosta virus is suspected. Per the CDC guidelines we recommend home isolation until the following conditions are all met:    1. At least five days have passed since symptoms first appeared and/or had a close exposure,   2. After home isolation for five days, wearing a mask around others for the next five days,  3. At least 24 have passed since last fever without the use of fever-reducing medications and  4.  Symptoms (eg cough, shortness of breath) have improved      Sincerely,          Malika Cuevas NP

## 2022-09-16 NOTE — ED NOTES
Pt presents to ED ambulatory complaining of cold symptoms x 3 days. Pt reports runny nose, cough, generalized body aches, sinus pain, and HA. Pt denies chest pain, SOB, abd pain, or N/V/D. Pt is alert and oriented x 4, RR even and unlabored, skin is warm and dry. Assessment completed and pt updated on plan of care. Call bell in reach. Emergency Department Nursing Plan of Care       The Nursing Plan of Care is developed from the Nursing assessment and Emergency Department Attending provider initial evaluation. The plan of care may be reviewed in the ED Provider note.     The Plan of Care was developed with the following considerations:   Patient / Family readiness to learn indicated by:verbalized understanding  Persons(s) to be included in education: patient  Barriers to Learning/Limitations:No    Signed     Kirk Brice    9/16/2022   2:24 PM

## 2022-09-17 NOTE — ED PROVIDER NOTES
EMERGENCY DEPARTMENT HISTORY AND PHYSICAL EXAM          Date: 9/16/2022  Patient Name: Perico Dos Santos    History of Presenting Illness     Chief Complaint   Patient presents with    Cold Symptoms     Runny nose, body aches, cough and headache for three days         History Provided By: Patient    HPI: Perico Dos Santos is a 29 y.o. female with a PMH of  depression, asthma  who presents with symptoms. Onset 3 days ago. Reports runny nose, body aches, headache and cough. Denies chest pain, shortness of breath, wheezing. No reports of fever or chills. Patient states she is concerned for COVID. She has not tried anything for symptoms. Denies alleviating or aggravating factors. PCP: None    Current Outpatient Medications   Medication Sig Dispense Refill    amoxicillin-clavulanate (Augmentin) 875-125 mg per tablet Take 1 Tablet by mouth two (2) times a day. 14 Tablet 0    guaiFENesin ER (MUCINEX) 600 mg ER tablet Take 1 Tablet by mouth two (2) times a day. 14 Tablet 0    cetirizine-pseudoePHEDrine (ZyrTEC-D) 5-120 mg Tb12 Take 1 Tablet by mouth two (2) times a day. 20 Tablet 0    albuterol (ACCUNEB) 1.25 mg/3 mL nebu Take 3 mL by inhalation every four (4) hours as needed for Wheezing (wheezing). 50 Each 0    albuterol (PROVENTIL HFA, VENTOLIN HFA, PROAIR HFA) 90 mcg/actuation inhaler Take 2 Puffs by inhalation every four (4) hours as needed for Wheezing. 1 Inhaler 0    Nebulizer & Compressor machine 1 Each by Does Not Apply route every four (4) hours as needed for Wheezing. As directed (Patient not taking: Reported on 9/16/2022) 1 Each 0    albuterol (PROVENTIL HFA, VENTOLIN HFA, PROAIR HFA) 90 mcg/actuation inhaler Take 2 Puffs by inhalation every four (4) hours as needed for Wheezing.  1 Each 1       Past History     Past Medical History:  Past Medical History:   Diagnosis Date    Asthma     Delivery normal     Depression     Ectopic pregnancy     Hypertension     not taking medications    Miscarriage Neurological disorder     migraines    Other ill-defined conditions(799.89)     ectopic pregnancy    Psychiatric disorder     anger        Past Surgical History:  Past Surgical History:   Procedure Laterality Date    HX CHOLECYSTECTOMY      HX GYN      right oophetrecomy    HX HERNIA REPAIR  15    Open repair right inguinal hernia with mesh    HX ORTHOPAEDIC      Right arm/leg    [place] ABDOMEN SURGERY PROC UNLISTED         Family History:  Family History   Problem Relation Age of Onset    Cancer Mother         breast    Hypertension Mother     Thyroid Disease Mother     Sickle Cell Trait Father     Asthma Sister     Asthma Brother     Asthma Sister     Asthma Sister     Asthma Sister     Asthma Sister     Asthma Sister     Asthma Brother     Asthma Brother     Asthma Brother     Asthma Brother     Sickle Cell Anemia Brother     Asthma Brother     Diabetes Paternal Grandmother        Social History:  Social History     Tobacco Use    Smoking status: Former     Packs/day: 0.00     Types: Cigarettes     Quit date: 2016     Years since quittin.4    Smokeless tobacco: Never    Tobacco comments:     social smoker   Substance Use Topics    Alcohol use: Yes     Comment: occasionally    Drug use: No       Allergies: Allergies   Allergen Reactions    Ibuprofen Hives and Itching    Ibuprofen Itching         Review of Systems   Review of Systems   Constitutional:  Negative for appetite change, chills, fatigue and fever. HENT:  Positive for congestion and rhinorrhea. Negative for ear discharge, ear pain, postnasal drip, sore throat and trouble swallowing. Eyes:  Negative for pain and itching. Respiratory:  Positive for cough. Negative for chest tightness, shortness of breath and wheezing. Cardiovascular:  Negative for chest pain, palpitations and leg swelling. Gastrointestinal:  Negative for abdominal pain, nausea and vomiting. Genitourinary:  Negative for dysuria, frequency and urgency. Musculoskeletal:  Negative for arthralgias, back pain and joint swelling. Skin:  Negative for color change and rash. Neurological:  Positive for headaches. Negative for dizziness and numbness. All other systems reviewed and are negative. Physical Exam     Vitals:    09/16/22 1344   BP: (!) 149/79   Pulse: (!) 104   Resp: 18   Temp: 99.2 °F (37.3 °C)   SpO2: 100%   Weight: 78.9 kg (174 lb)   Height: 5' 8\" (1.727 m)     Physical Exam  Vitals and nursing note reviewed. Constitutional:       General: She is not in acute distress. Appearance: She is well-developed. HENT:      Head: Normocephalic and atraumatic. Right Ear: Tympanic membrane and ear canal normal.      Left Ear: A middle ear effusion is present. Tympanic membrane is erythematous and bulging. Nose: Congestion present. Mouth/Throat:      Mouth: Mucous membranes are moist.      Pharynx: Oropharynx is clear. Uvula midline. Posterior oropharyngeal erythema present. No pharyngeal swelling or oropharyngeal exudate. Tonsils: No tonsillar exudate. Eyes:      Extraocular Movements: Extraocular movements intact. Conjunctiva/sclera: Conjunctivae normal.      Pupils: Pupils are equal, round, and reactive to light. Cardiovascular:      Rate and Rhythm: Normal rate and regular rhythm. Pulses: Normal pulses. Heart sounds: Normal heart sounds. Pulmonary:      Effort: Pulmonary effort is normal.      Breath sounds: Normal breath sounds. Musculoskeletal:      Cervical back: Normal range of motion and neck supple. Skin:     General: Skin is warm and dry. Neurological:      Mental Status: She is alert and oriented to person, place, and time.          Diagnostic Study Results     Labs -     Recent Results (from the past 12 hour(s))   SARS-COV-2    Collection Time: 09/16/22  2:50 PM   Result Value Ref Range    Specimen source Nasopharyngeal      SARS-CoV-2 Not detected NOTD         Radiologic Studies -   No orders to display     CT Results  (Last 48 hours)      None          CXR Results  (Last 48 hours)      None              Medical Decision Making   I am the first provider for this patient. I reviewed the vital signs, available nursing notes, past medical history, past surgical history, family history and social history. Vital Signs-Reviewed the patient's vital signs. Records Reviewed: Nursing Notes and Old Medical Records    Provider Notes (Medical Decision Making):   DDX: COVID 19, URI, Bronchitis, Influenza            Disposition:  Discharge     DISCHARGE NOTE:         Care plan outlined and precautions discussed. Patient has no new complaints, changes, or physical findings. All of pt's questions and concerns were addressed. Patient was instructed and agrees to follow up with PCP, as well as to return to the ED upon further deterioration. Patient is ready to go home. Follow-up Information       Follow up With Specialties Details Why [facility] EMERGENCY DEPT Emergency Medicine Call in 1 week As needed, If symptoms worsen Mayda Melissa            Discharge Medication List as of 9/16/2022  2:46 PM        START taking these medications    Details   amoxicillin-clavulanate (Augmentin) 875-125 mg per tablet Take 1 Tablet by mouth two (2) times a day., Normal, Disp-14 Tablet, R-0      guaiFENesin ER (MUCINEX) 600 mg ER tablet Take 1 Tablet by mouth two (2) times a day., Normal, Disp-14 Tablet, R-0           CONTINUE these medications which have CHANGED    Details   cetirizine-pseudoePHEDrine (ZyrTEC-D) 5-120 mg Tb12 Take 1 Tablet by mouth two (2) times a day., Normal, Disp-20 Tablet, R-0           CONTINUE these medications which have NOT CHANGED    Details   albuterol (ACCUNEB) 1.25 mg/3 mL nebu Take 3 mL by inhalation every four (4) hours as needed for Wheezing (wheezing). , Normal, Disp-50 Each, R-0      !! albuterol (PROVENTIL HFA, VENTOLIN HFA, PROAIR HFA) 90 mcg/actuation inhaler Take 2 Puffs by inhalation every four (4) hours as needed for Wheezing., Normal, Disp-1 Inhaler, R-0      Nebulizer & Compressor machine 1 Each by Does Not Apply route every four (4) hours as needed for Wheezing. As directed, Normal, Disp-1 Each, R-0      !! albuterol (PROVENTIL HFA, VENTOLIN HFA, PROAIR HFA) 90 mcg/actuation inhaler Take 2 Puffs by inhalation every four (4) hours as needed for Wheezing., Normal, Disp-1 Each, R-1       !! - Potential duplicate medications found. Please discuss with provider. Procedures:  Procedures    Please note that this dictation was completed with Dragon, computer voice recognition software. Quite often unanticipated grammatical, syntax, homophones, and other interpretive errors are inadvertently transcribed by the computer software. Please disregard these errors. Additionally, please excuse any errors that have escaped final proofreading. Diagnosis     Clinical Impression:   1. Non-recurrent acute suppurative otitis media of left ear without spontaneous rupture of tympanic membrane    2.  Acute URI

## 2022-10-20 ENCOUNTER — HOSPITAL ENCOUNTER (EMERGENCY)
Age: 37
Discharge: HOME | End: 2022-10-20
Attending: EMERGENCY MEDICINE
Payer: MEDICAID

## 2022-10-20 VITALS
RESPIRATION RATE: 19 BRPM | DIASTOLIC BLOOD PRESSURE: 85 MMHG | HEART RATE: 86 BPM | HEIGHT: 68 IN | OXYGEN SATURATION: 100 % | BODY MASS INDEX: 26.37 KG/M2 | TEMPERATURE: 98.7 F | SYSTOLIC BLOOD PRESSURE: 128 MMHG | WEIGHT: 174 LBS

## 2022-10-20 DIAGNOSIS — J06.9 ACUTE UPPER RESPIRATORY INFECTION: Primary | ICD-10-CM

## 2022-10-20 DIAGNOSIS — J45.21 MILD INTERMITTENT ASTHMA WITH ACUTE EXACERBATION: ICD-10-CM

## 2022-10-20 DIAGNOSIS — H65.02 NON-RECURRENT ACUTE SEROUS OTITIS MEDIA OF LEFT EAR: ICD-10-CM

## 2022-10-20 PROCEDURE — 99283 EMERGENCY DEPT VISIT LOW MDM: CPT

## 2022-10-20 RX ORDER — PREDNISONE 50 MG/1
50 TABLET ORAL DAILY
Qty: 5 TABLET | Refills: 0 | Status: SHIPPED | OUTPATIENT
Start: 2022-10-20 | End: 2022-10-25

## 2022-10-20 RX ORDER — BENZONATATE 100 MG/1
100 CAPSULE ORAL
Qty: 30 CAPSULE | Refills: 0 | Status: SHIPPED | OUTPATIENT
Start: 2022-10-20 | End: 2022-10-27

## 2022-10-20 RX ORDER — AMOXICILLIN AND CLAVULANATE POTASSIUM 875; 125 MG/1; MG/1
1 TABLET, FILM COATED ORAL 2 TIMES DAILY
Qty: 14 TABLET | Refills: 0 | Status: SHIPPED | OUTPATIENT
Start: 2022-10-20 | End: 2022-10-27

## 2022-10-20 NOTE — ED PROVIDER NOTES
EMERGENCY DEPARTMENT HISTORY AND PHYSICAL EXAM      Date: 10/20/2022  Patient Name: Lynn Torres    History of Presenting Illness     Chief Complaint   Patient presents with    Cough     Productive cough with yellow sputum, sore throat and runny nose x 3 days. History Provided By: Patient    HPI: Lynn Torres, 29 y.o. female with PMHx significant for asthma, hypertension, presents to the ED with cc of cough. For the last 3 days, the patient has had gradually worsening productive cough with yellow sputum, rhinorrhea, sore throat, and bilateral ear pain. She has been wheezing and has been using her albuterol nebulizer with minimal relief. She denies fevers, chest pain, shortness of breath. She denies known ill contacts. There are no other complaints, changes, or physical findings at this time. PCP: None    No current facility-administered medications on file prior to encounter. Current Outpatient Medications on File Prior to Encounter   Medication Sig Dispense Refill    amoxicillin-clavulanate (Augmentin) 875-125 mg per tablet Take 1 Tablet by mouth two (2) times a day. 14 Tablet 0    guaiFENesin ER (MUCINEX) 600 mg ER tablet Take 1 Tablet by mouth two (2) times a day. 14 Tablet 0    cetirizine-pseudoePHEDrine (ZyrTEC-D) 5-120 mg Tb12 Take 1 Tablet by mouth two (2) times a day. 20 Tablet 0    Nebulizer & Compressor machine 1 Each by Does Not Apply route every four (4) hours as needed for Wheezing. As directed (Patient not taking: Reported on 9/16/2022) 1 Each 0    albuterol (ACCUNEB) 1.25 mg/3 mL nebu Take 3 mL by inhalation every four (4) hours as needed for Wheezing (wheezing). 50 Each 0    albuterol (PROVENTIL HFA, VENTOLIN HFA, PROAIR HFA) 90 mcg/actuation inhaler Take 2 Puffs by inhalation every four (4) hours as needed for Wheezing.  1 Each 1    albuterol (PROVENTIL HFA, VENTOLIN HFA, PROAIR HFA) 90 mcg/actuation inhaler Take 2 Puffs by inhalation every four (4) hours as needed for Wheezing. 1 Inhaler 0       Past History     Past Medical History:  Past Medical History:   Diagnosis Date    Asthma     Delivery normal     Depression     Ectopic pregnancy     Hypertension     not taking medications    Miscarriage     Neurological disorder     migraines    Other ill-defined conditions(799.89)     ectopic pregnancy    Psychiatric disorder     anger        Past Surgical History:  Past Surgical History:   Procedure Laterality Date    HX CHOLECYSTECTOMY      HX GYN      right oophetrecomy    HX HERNIA REPAIR  15    Open repair right inguinal hernia with mesh    HX ORTHOPAEDIC      Right arm/leg    [place] ABDOMEN SURGERY PROC UNLISTED         Family History:  Family History   Problem Relation Age of Onset    Cancer Mother         breast    Hypertension Mother     Thyroid Disease Mother     Sickle Cell Trait Father     Asthma Sister     Asthma Brother     Asthma Sister     Asthma Sister     Asthma Sister     Asthma Sister     Asthma Sister     Asthma Brother     Asthma Brother     Asthma Brother     Asthma Brother     Sickle Cell Anemia Brother     Asthma Brother     Diabetes Paternal Grandmother        Social History:  Social History     Tobacco Use    Smoking status: Former     Packs/day: 0.00     Types: Cigarettes     Quit date: 2016     Years since quittin.5    Smokeless tobacco: Never    Tobacco comments:     social smoker   Substance Use Topics    Alcohol use: Yes     Comment: occasionally    Drug use: No       Allergies: Allergies   Allergen Reactions    Ibuprofen Hives and Itching    Ibuprofen Itching         Review of Systems   Review of Systems   Constitutional:  Negative for chills and fever. HENT:  Positive for congestion, ear pain and sore throat. Eyes:  Negative for redness and visual disturbance. Respiratory:  Positive for cough and wheezing. Negative for shortness of breath. Cardiovascular:  Negative for chest pain and palpitations.    Gastrointestinal:  Negative for abdominal pain, nausea and vomiting. Genitourinary:  Negative for dysuria and hematuria. Musculoskeletal:  Negative for back pain and gait problem. Skin:  Negative for rash and wound. Neurological:  Negative for dizziness and headaches. Psychiatric/Behavioral:  Negative for behavioral problems and confusion. All other systems reviewed and are negative. Physical Exam   Physical Exam  Vitals and nursing note reviewed. Constitutional:       Appearance: She is not toxic-appearing. Comments: Interactive female in no acute distress. HENT:      Head: Normocephalic and atraumatic. Ears:      Comments: Left TM is erythematous with middle ear effusion. Right TM is clear. Mouth/Throat:      Mouth: Mucous membranes are moist.      Comments: Erythema of the posterior oropharynx. No tonsillar enlargement no exudates. Eyes:      Extraocular Movements: Extraocular movements intact. Pupils: Pupils are equal, round, and reactive to light. Cardiovascular:      Rate and Rhythm: Normal rate and regular rhythm. Heart sounds: No murmur heard. Pulmonary:      Effort: Pulmonary effort is normal. No respiratory distress. Breath sounds: Normal breath sounds. No wheezing. Musculoskeletal:         General: No deformity. Normal range of motion. Cervical back: Normal range of motion and neck supple. Skin:     General: Skin is warm and dry. Neurological:      General: No focal deficit present. Mental Status: She is alert and oriented to person, place, and time. Psychiatric:         Behavior: Behavior normal.         Diagnostic Study Results     Labs -   No results found for this or any previous visit (from the past 12 hour(s)). Radiologic Studies -   No orders to display     CT Results  (Last 48 hours)      None          CXR Results  (Last 48 hours)      None              Medical Decision Making   I am the first provider for this patient.     I reviewed the vital signs, available nursing notes, past medical history, past surgical history, family history and social history. Vital Signs-Reviewed the patient's vital signs. Patient Vitals for the past 12 hrs:   Temp Pulse Resp BP SpO2   10/20/22 1245 98.7 °F (37.1 °C) 86 19 128/85 100 %         Records Reviewed: Nursing Notes and Old Medical Records      Provider Notes (Medical Decision Making):   DDx: Viral URI, asthma exacerbation, otitis media, pharyngitis    This is a 60-year-old female who presents with URI symptoms. She is afebrile and clinically well-appearing. Lungs are clear to auscultation at this time and she is maintaining high oxygenation on room air. She does have evidence of left otitis media, will treat with antibiotic. We will also treat with steroid for asthma exacerbation. Discussed supportive treatment, follow-up, and return precautions. ED Course:   Initial assessment performed. The patients presenting problems have been discussed, and they are in agreement with the care plan formulated and outlined with them. I have encouraged them to ask questions as they arise throughout their visit. Disposition:  2:00 PM    The patient has been re-evaluated and is ready for discharge. Reviewed available results with patient. Counseled patient on diagnosis and care plan. Patient has expressed understanding, and all questions have been answered. Patient agrees with plan and agrees to follow up as recommended, or to return to the ED if their symptoms worsen. Discharge instructions have been provided and explained to the patient, along with reasons to return to the ED. PLAN:  1. Current Discharge Medication List        START taking these medications    Details   predniSONE (DELTASONE) 50 mg tablet Take 1 Tablet by mouth daily for 5 days.   Qty: 5 Tablet, Refills: 0  Start date: 10/20/2022, End date: 10/25/2022      benzonatate (Tessalon Perles) 100 mg capsule Take 1 Capsule by mouth three (3) times daily as needed for Cough for up to 7 days. Qty: 30 Capsule, Refills: 0  Start date: 10/20/2022, End date: 10/27/2022      !! amoxicillin-clavulanate (Augmentin) 875-125 mg per tablet Take 1 Tablet by mouth two (2) times a day for 7 days. Qty: 14 Tablet, Refills: 0  Start date: 10/20/2022, End date: 10/27/2022       !! - Potential duplicate medications found. Please discuss with provider. CONTINUE these medications which have NOT CHANGED    Details   !! amoxicillin-clavulanate (Augmentin) 875-125 mg per tablet Take 1 Tablet by mouth two (2) times a day. Qty: 14 Tablet, Refills: 0       !! - Potential duplicate medications found. Please discuss with provider. 2.   Follow-up Information       Follow up With Specialties Details Why Contact Info    Your primary care provider  Schedule an appointment as soon as possible for a visit in 1 week      [facility] EMERGENCY DEPT Emergency Medicine Go to  If symptoms worsen Cory Keonjeffrey  [phone]          Return to ED if worse     Diagnosis     Clinical Impression:   1. Acute upper respiratory infection    2. Non-recurrent acute serous otitis media of left ear    3. Mild intermittent asthma with acute exacerbation            Herbie King.  DORI Monterroso  10/20/22 2:00 PM

## 2022-11-07 ENCOUNTER — HOSPITAL ENCOUNTER (EMERGENCY)
Age: 37
Discharge: HOME | End: 2022-11-07
Attending: EMERGENCY MEDICINE
Payer: MEDICAID

## 2022-11-07 VITALS
SYSTOLIC BLOOD PRESSURE: 138 MMHG | RESPIRATION RATE: 16 BRPM | HEART RATE: 94 BPM | BODY MASS INDEX: 26.37 KG/M2 | TEMPERATURE: 98.1 F | HEIGHT: 68 IN | WEIGHT: 174 LBS | OXYGEN SATURATION: 99 % | DIASTOLIC BLOOD PRESSURE: 83 MMHG

## 2022-11-07 DIAGNOSIS — N89.8 VAGINAL IRRITATION: Primary | ICD-10-CM

## 2022-11-07 LAB
APPEARANCE UR: ABNORMAL
BACTERIA URNS QL MICRO: NEGATIVE /HPF
BILIRUB UR QL: NEGATIVE
CLUE CELLS VAG QL WET PREP: NORMAL
COLOR UR: ABNORMAL
EPITH CASTS URNS QL MICRO: ABNORMAL /LPF
GLUCOSE UR STRIP.AUTO-MCNC: NEGATIVE MG/DL
HCG UR QL: NEGATIVE
HGB UR QL STRIP: ABNORMAL
KETONES UR QL STRIP.AUTO: NEGATIVE MG/DL
KOH PREP SPEC: NORMAL
LEUKOCYTE ESTERASE UR QL STRIP.AUTO: ABNORMAL
NITRITE UR QL STRIP.AUTO: NEGATIVE
PH UR STRIP: 5.5 [PH] (ref 5–8)
PROT UR STRIP-MCNC: NEGATIVE MG/DL
RBC #/AREA URNS HPF: ABNORMAL /HPF (ref 0–5)
SERVICE CMNT-IMP: NORMAL
SP GR UR REFRACTOMETRY: 1.02
T VAGINALIS VAG QL WET PREP: NORMAL
UA: UC IF INDICATED,UAUC: ABNORMAL
UROBILINOGEN UR QL STRIP.AUTO: 1 EU/DL (ref 0.2–1)
WBC URNS QL MICRO: ABNORMAL /HPF (ref 0–4)

## 2022-11-07 PROCEDURE — 87491 CHLMYD TRACH DNA AMP PROBE: CPT

## 2022-11-07 PROCEDURE — 87210 SMEAR WET MOUNT SALINE/INK: CPT

## 2022-11-07 PROCEDURE — 74011250637 HC RX REV CODE- 250/637: Performed by: PHYSICIAN ASSISTANT

## 2022-11-07 PROCEDURE — 81025 URINE PREGNANCY TEST: CPT

## 2022-11-07 PROCEDURE — 81001 URINALYSIS AUTO W/SCOPE: CPT

## 2022-11-07 PROCEDURE — 99283 EMERGENCY DEPT VISIT LOW MDM: CPT

## 2022-11-07 RX ORDER — FLUCONAZOLE 150 MG/1
150 TABLET ORAL
Status: COMPLETED | OUTPATIENT
Start: 2022-11-07 | End: 2022-11-07

## 2022-11-07 RX ADMIN — FLUCONAZOLE 150 MG: 150 TABLET ORAL at 13:53

## 2022-11-07 NOTE — ED PROVIDER NOTES
EMERGENCY DEPARTMENT HISTORY AND PHYSICAL EXAM      Date: 11/7/2022  Patient Name: Jose Tubbs    History of Presenting Illness     Chief Complaint   Patient presents with    Vaginal Itching       History Provided By: Patient    HPI: Jose Tubbs, 29 y.o. female with PMHx asthma, depression, and additional history as noted below, per patient and record review, presents  to the ED with cc of vaginal irritation for the past 2 days. Endorses irritation, but denies any abnormal discharge or bleeding. Denies any rashes, bumps, lesions to the groin. Denies urinary urgency, frequency, burning. Denies concern for pregnancy. Denies concern for STDs or STIs. Patient states she was recently on an antibiotic (10/20). Denies fevers or chills, sore throat, chest pain, shortness of breath abdominal pain, nausea vomiting, diarrhea, changes in bowel or bladder habits, sore throat, joint pain or swelling, rashes. No medications taken for symptoms prior to arrival, although states when she applies a cool washcloth to the area it provides mild, temporary symptom improvement. No additional exacerbating or alleviating factors. No other complaints this time. There are no other complaints, changes, or physical findings at this time. PCP: None    Current Outpatient Medications   Medication Sig Dispense Refill    amoxicillin-clavulanate (Augmentin) 875-125 mg per tablet Take 1 Tablet by mouth two (2) times a day. 14 Tablet 0    guaiFENesin ER (MUCINEX) 600 mg ER tablet Take 1 Tablet by mouth two (2) times a day. 14 Tablet 0    cetirizine-pseudoePHEDrine (ZyrTEC-D) 5-120 mg Tb12 Take 1 Tablet by mouth two (2) times a day. 20 Tablet 0    Nebulizer & Compressor machine 1 Each by Does Not Apply route every four (4) hours as needed for Wheezing.  As directed (Patient not taking: Reported on 9/16/2022) 1 Each 0    albuterol (ACCUNEB) 1.25 mg/3 mL nebu Take 3 mL by inhalation every four (4) hours as needed for Wheezing (wheezing). 50 Each 0    albuterol (PROVENTIL HFA, VENTOLIN HFA, PROAIR HFA) 90 mcg/actuation inhaler Take 2 Puffs by inhalation every four (4) hours as needed for Wheezing. 1 Each 1    albuterol (PROVENTIL HFA, VENTOLIN HFA, PROAIR HFA) 90 mcg/actuation inhaler Take 2 Puffs by inhalation every four (4) hours as needed for Wheezing. 1 Inhaler 0     Past History     Past Medical History:  Past Medical History:   Diagnosis Date    Asthma     Delivery normal     Depression     Ectopic pregnancy     Hypertension     not taking medications    Miscarriage     Neurological disorder     migraines    Other ill-defined conditions(799.89)     ectopic pregnancy    Psychiatric disorder     anger        Past Surgical History:  Past Surgical History:   Procedure Laterality Date    HX CHOLECYSTECTOMY      HX GYN      right oophetrecomy    HX HERNIA REPAIR  15    Open repair right inguinal hernia with mesh    HX ORTHOPAEDIC      Right arm/leg    UT ABDOMEN SURGERY PROC UNLISTED         Family History:  Family History   Problem Relation Age of Onset    Cancer Mother         breast    Hypertension Mother     Thyroid Disease Mother     Sickle Cell Trait Father     Asthma Sister     Asthma Brother     Asthma Sister     Asthma Sister     Asthma Sister     Asthma Sister     Asthma Sister     Asthma Brother     Asthma Brother     Asthma Brother     Asthma Brother     Sickle Cell Anemia Brother     Asthma Brother     Diabetes Paternal Grandmother        Social History:  Social History     Tobacco Use    Smoking status: Former     Packs/day: 0.00     Types: Cigarettes     Quit date: 2016     Years since quittin.6    Smokeless tobacco: Never    Tobacco comments:     social smoker   Substance Use Topics    Alcohol use: Yes     Comment: occasionally    Drug use: No       Allergies:   Allergies   Allergen Reactions    Ibuprofen Hives and Itching    Ibuprofen Itching     Review of Systems   Review of Systems   Constitutional:  Negative for appetite change, chills and fever. HENT:  Negative for congestion and sore throat. Eyes:  Negative for pain. Respiratory:  Negative for cough and shortness of breath. Cardiovascular:  Negative for chest pain. Gastrointestinal:  Negative for abdominal pain, constipation, diarrhea, nausea and vomiting. Genitourinary:  Positive for vaginal pain (vaginal irritation). Negative for decreased urine volume, difficulty urinating, dysuria, enuresis, flank pain, frequency, genital sores, hematuria, menstrual problem, pelvic pain, urgency, vaginal bleeding and vaginal discharge. Musculoskeletal:  Negative for back pain, joint swelling, neck pain and neck stiffness. Skin:  Negative for rash. Neurological:  Negative for syncope and headaches. All other systems reviewed and are negative. Physical Exam   Physical Exam  Vitals and nursing note reviewed. Constitutional:       General: She is not in acute distress. Appearance: Normal appearance. She is not ill-appearing, toxic-appearing or diaphoretic. Comments: 29 y.o. female   HENT:      Head: Normocephalic and atraumatic. Nose: Nose normal.      Mouth/Throat:      Mouth: Mucous membranes are moist.   Eyes:      Extraocular Movements: Extraocular movements intact. Conjunctiva/sclera: Conjunctivae normal.   Cardiovascular:      Rate and Rhythm: Normal rate. Pulmonary:      Effort: Pulmonary effort is normal. No respiratory distress. Abdominal:      General: There is no distension. Palpations: Abdomen is soft. There is no mass. Tenderness: There is no abdominal tenderness. There is no right CVA tenderness, left CVA tenderness or guarding. Genitourinary:     Comments: Differs, prefers self swab   Musculoskeletal:         General: Normal range of motion. Cervical back: Normal range of motion. Skin:     General: Skin is warm and dry. Neurological:      General: No focal deficit present.       Mental Status: She is alert and oriented to person, place, and time. Psychiatric:         Mood and Affect: Mood normal.         Behavior: Behavior normal.     Diagnostic Study Results     Labs -     No results found for this or any previous visit (from the past 12 hour(s)). Radiologic Studies -   No orders to display     CT Results  (Last 48 hours)      None          CXR Results  (Last 48 hours)      None          Medical Decision Making   I am the first provider for this patient. I reviewed the vital signs, available nursing notes, past medical history, past surgical history, family history and social history. Vital Signs-Reviewed the patient's vital signs. No data found. Records Reviewed: Nursing Notes and Old Medical Records    Provider Notes (Medical Decision Making):   Patient presents to the ED for evaluation of vaginal irritation as noted above. Denies concern for STDs or STIs. Defers empiric treatment. Discussed pending G/C results, and MyChart access. Although swabs without a yeast, given patient had recent antibiotics in the past few weeks, suspect irritation secondary to possibility of yeast infection, treated with one-time fluconazole dose here in the ED. No evidence of emergent conditions requiring further evaluation/management acutely at this time. Shared decision-making form and care plan created together discussed results, diagnosis, treatment plan. Women's health and PCP follow-up. Verbal return precautions advised. Patient verbalizes understanding and agreement of current plan of care. ED Course:   Initial assessment performed. The patients presenting problems have been discussed, and they are in agreement with the care plan formulated and outlined with them. I have encouraged them to ask questions as they arise throughout their visit. Disposition:  Discharge     PLAN:  1. Discharge Medication List as of 11/7/2022  2:02 PM        2.    Follow-up Information       Follow up With Specialties Details Why Contact Info    [facility] EMERGENCY DEPT Emergency Medicine  If symptoms worsen, As needed [address]  [facility], [address] [place] [address] [address]    Ayla Ag MD Internal Medicine Physician   [address]  [address]      [address] [facility] [phone]          Return to ED if worse     Diagnosis     Clinical Impression:   1.  Vaginal irritation

## 2022-11-07 NOTE — Clinical Note
[facility] EMERGENCY DEPT  [address] [facility] [id number] [phone]    Work/School Note    Date: 11/7/2022    To Whom It May concern:      Suman Berman was seen and treated today in the emergency room by the following provider(s):  Attending Provider: Ceferino Tirado MD  Physician Assistant: Travis Aguila. Suman Berman is excused from work/school on 11/07/22. She is clear to return to work/school on 11/08/22.         Sincerely,          HOWARD Stewart

## 2022-11-07 NOTE — ED NOTES
Patient presents with vaginal itching X2 days. Denies any other symptoms. Hx of asthma. Emergency Department Nursing Plan of Care       The Nursing Plan of Care is developed from the Nursing assessment and Emergency Department Attending provider initial evaluation. The plan of care may be reviewed in the ED Provider note.     The Plan of Care was developed with the following considerations:   Patient / Family readiness to learn indicated by:verbalized understanding and appropriate questions asked  Persons(s) to be included in education: patient  Barriers to Learning/Limitations:No    Signed     Iona Jackson RN    11/7/2022   12:50 PM

## 2022-12-08 ENCOUNTER — HOSPITAL ENCOUNTER (EMERGENCY)
Age: 37
Discharge: HOME | End: 2022-12-09
Attending: EMERGENCY MEDICINE
Payer: MEDICAID

## 2022-12-08 VITALS
TEMPERATURE: 99 F | WEIGHT: 175 LBS | OXYGEN SATURATION: 100 % | HEIGHT: 68 IN | DIASTOLIC BLOOD PRESSURE: 88 MMHG | SYSTOLIC BLOOD PRESSURE: 146 MMHG | HEART RATE: 97 BPM | BODY MASS INDEX: 26.52 KG/M2 | RESPIRATION RATE: 16 BRPM

## 2022-12-08 DIAGNOSIS — N30.00 ACUTE CYSTITIS WITHOUT HEMATURIA: Primary | ICD-10-CM

## 2022-12-08 PROCEDURE — 99283 EMERGENCY DEPT VISIT LOW MDM: CPT

## 2022-12-08 PROCEDURE — 81001 URINALYSIS AUTO W/SCOPE: CPT

## 2022-12-09 LAB
APPEARANCE UR: CLEAR
BACTERIA URNS QL MICRO: ABNORMAL /HPF
BILIRUB UR QL: NEGATIVE
COLOR UR: ABNORMAL
EPITH CASTS URNS QL MICRO: ABNORMAL /LPF
GLUCOSE UR STRIP.AUTO-MCNC: NEGATIVE MG/DL
HGB UR QL STRIP: NEGATIVE
KETONES UR QL STRIP.AUTO: ABNORMAL MG/DL
LEUKOCYTE ESTERASE UR QL STRIP.AUTO: ABNORMAL
NITRITE UR QL STRIP.AUTO: NEGATIVE
PH UR STRIP: 6.5 [PH] (ref 5–8)
PROT UR STRIP-MCNC: NEGATIVE MG/DL
RBC #/AREA URNS HPF: ABNORMAL /HPF (ref 0–5)
SP GR UR REFRACTOMETRY: 1.02
UA: UC IF INDICATED,UAUC: ABNORMAL
UROBILINOGEN UR QL STRIP.AUTO: 1 EU/DL (ref 0.2–1)
WBC URNS QL MICRO: ABNORMAL /HPF (ref 0–4)

## 2022-12-09 RX ORDER — PHENAZOPYRIDINE HYDROCHLORIDE 200 MG/1
200 TABLET, FILM COATED ORAL
Qty: 6 TABLET | Refills: 0 | Status: SHIPPED | OUTPATIENT
Start: 2022-12-09 | End: 2022-12-11

## 2022-12-09 RX ORDER — CEPHALEXIN 500 MG/1
500 CAPSULE ORAL 2 TIMES DAILY
Qty: 14 CAPSULE | Refills: 0 | Status: SHIPPED | OUTPATIENT
Start: 2022-12-09 | End: 2022-12-16

## 2022-12-09 RX ORDER — ACETAMINOPHEN 325 MG/1
650 TABLET ORAL
Qty: 20 TABLET | Refills: 0 | Status: SHIPPED | OUTPATIENT
Start: 2022-12-09

## 2022-12-09 NOTE — ED TRIAGE NOTES
Patient presents ambulatory to the ED with c/o lower back pain x 2 days. Denies injury. Reports urinary urgency. Denies taking medication for pain. Reports hx of arthritis in back.

## 2022-12-09 NOTE — ED PROVIDER NOTES
EMERGENCY DEPARTMENT HISTORY AND PHYSICAL EXAM          Date: 12/8/2022  Patient Name: Marilyne Gitelman    History of Presenting Illness     Chief Complaint   Patient presents with    Back Pain         History Provided By: Patient      HPI: Marilyne Gitelman is a 40 y.o. female with a PMH of  Depression, Asthma, HTN  who presents with back pain. Onset 2 days ago. Located to the lower back. Associated with urinary urgency. Denies urinary pain, fever, chills, vaginal discharge, itching or odor. Denies heavy lifting, pushing or pulling. She does report history of arthritis in her back and unsure if that may be the cause as well. Denies alleviating or aggravating factors. PCP: None    Current Outpatient Medications   Medication Sig Dispense Refill    cephALEXin (Keflex) 500 mg capsule Take 1 Capsule by mouth two (2) times a day for 7 days. 14 Capsule 0    phenazopyridine (Pyridium) 200 mg tablet Take 1 Tablet by mouth three (3) times daily as needed for Pain for up to 2 days. 6 Tablet 0    acetaminophen (TYLENOL) 325 mg tablet Take 2 Tablets by mouth every four (4) hours as needed for Pain. 20 Tablet 0    guaiFENesin ER (MUCINEX) 600 mg ER tablet Take 1 Tablet by mouth two (2) times a day. 14 Tablet 0    cetirizine-pseudoePHEDrine (ZyrTEC-D) 5-120 mg Tb12 Take 1 Tablet by mouth two (2) times a day. 20 Tablet 0    Nebulizer & Compressor machine 1 Each by Does Not Apply route every four (4) hours as needed for Wheezing. As directed (Patient not taking: No sig reported) 1 Each 0    albuterol (ACCUNEB) 1.25 mg/3 mL nebu Take 3 mL by inhalation every four (4) hours as needed for Wheezing (wheezing). 50 Each 0    albuterol (PROVENTIL HFA, VENTOLIN HFA, PROAIR HFA) 90 mcg/actuation inhaler Take 2 Puffs by inhalation every four (4) hours as needed for Wheezing.  1 Each 1    albuterol (PROVENTIL HFA, VENTOLIN HFA, PROAIR HFA) 90 mcg/actuation inhaler Take 2 Puffs by inhalation every four (4) hours as needed for Wheezing. 1 Inhaler 0       Past History     Past Medical History:  Past Medical History:   Diagnosis Date    Asthma     Delivery normal     Depression     Ectopic pregnancy     Hypertension     not taking medications    Miscarriage     Neurological disorder     migraines    Other ill-defined conditions(799.89)     ectopic pregnancy    Psychiatric disorder     anger        Past Surgical History:  Past Surgical History:   Procedure Laterality Date    HX CHOLECYSTECTOMY      HX GYN      right oophetrecomy    HX HERNIA REPAIR  15    Open repair right inguinal hernia with mesh    HX ORTHOPAEDIC      Right arm/leg    [place] ABDOMEN SURGERY PROC UNLISTED         Family History:  Family History   Problem Relation Age of Onset    Cancer Mother         breast    Hypertension Mother     Thyroid Disease Mother     Sickle Cell Trait Father     Asthma Sister     Asthma Brother     Asthma Sister     Asthma Sister     Asthma Sister     Asthma Sister     Asthma Sister     Asthma Brother     Asthma Brother     Asthma Brother     Asthma Brother     Sickle Cell Anemia Brother     Asthma Brother     Diabetes Paternal Grandmother        Social History:  Social History     Tobacco Use    Smoking status: Former     Packs/day: 0.00     Types: Cigarettes     Quit date: 2016     Years since quittin.6    Smokeless tobacco: Never    Tobacco comments:     social smoker   Substance Use Topics    Alcohol use: Yes     Comment: occasionally    Drug use: No       Allergies: Allergies   Allergen Reactions    Ibuprofen Hives and Itching    Ibuprofen Itching         Review of Systems   Review of Systems   Constitutional:  Negative for appetite change, chills, fatigue and fever. HENT:  Negative for congestion, ear pain and rhinorrhea. Eyes:  Negative for pain and itching. Respiratory:  Negative for cough, chest tightness, shortness of breath and wheezing. Cardiovascular:  Negative for chest pain, palpitations and leg swelling. Gastrointestinal:  Negative for abdominal pain, nausea and vomiting. Genitourinary:  Positive for urgency. Negative for dysuria, flank pain, frequency, hematuria and vaginal discharge. Musculoskeletal:  Positive for back pain. Negative for arthralgias and joint swelling. Skin:  Negative for color change and rash. Neurological:  Negative for dizziness, numbness and headaches. All other systems reviewed and are negative. Physical Exam     Vitals:    12/08/22 2326   BP: (!) 146/88   Pulse: 97   Resp: 16   Temp: 99 °F (37.2 °C)   SpO2: 100%   Weight: 79.4 kg (175 lb)   Height: 5' 8\" (1.727 m)     Physical Exam  Vitals and nursing note reviewed. Constitutional:       General: She is not in acute distress. Appearance: She is well-developed. She is not ill-appearing. HENT:      Head: Normocephalic and atraumatic. Right Ear: Tympanic membrane and ear canal normal.      Left Ear: Tympanic membrane and ear canal normal.      Nose: Nose normal.      Mouth/Throat:      Mouth: Mucous membranes are moist.      Pharynx: Oropharynx is clear. No oropharyngeal exudate or posterior oropharyngeal erythema. Eyes:      Extraocular Movements: Extraocular movements intact. Conjunctiva/sclera: Conjunctivae normal.      Pupils: Pupils are equal, round, and reactive to light. Cardiovascular:      Rate and Rhythm: Normal rate and regular rhythm. Pulses: Normal pulses. Heart sounds: Normal heart sounds. Pulmonary:      Effort: Pulmonary effort is normal.      Breath sounds: Normal breath sounds. Abdominal:      General: Bowel sounds are normal.      Palpations: Abdomen is soft. There is no mass. Tenderness: There is no abdominal tenderness. There is no right CVA tenderness, left CVA tenderness, guarding or rebound. Musculoskeletal:      Cervical back: Normal range of motion and neck supple. Skin:     General: Skin is warm and dry.    Neurological:      Mental Status: She is alert and oriented to person, place, and time. GCS: GCS eye subscore is 4. GCS verbal subscore is 5. GCS motor subscore is 6. Medical Decision Making   I am the first provider for this patient. I reviewed the vital signs, available nursing notes, past medical history, past surgical history, family history and social history. Vital Signs-Reviewed the patient's vital signs. Records Reviewed: Nursing Notes and Old Medical Records    Provider Notes (Medical Decision Making):     60-year-old female presenting with back pain exhibiting no spinal or muscular Kuehler tenderness on exam.  She also exhibits no abdominal tenderness. She does arrive with a low-grade temp and mildly elevated blood pressure. UA is remarkable for bacteria and leukocytes. Due to urinary frequency plan to treat with Keflex 500 mg p.o. twice daily for 7 days. In addition will give Pyridium to help with her urinary urgency. Procedures:  Procedures    Diagnostic Study Results     Labs -     Recent Results (from the past 12 hour(s))   URINALYSIS W/ REFLEX CULTURE    Collection Time: 12/08/22 11:39 PM    Specimen: Urine   Result Value Ref Range    Color YELLOW/STRAW      Appearance CLEAR CLEAR      Specific gravity 1.025      pH (UA) 6.5 5.0 - 8.0      Protein Negative NEG mg/dL    Glucose Negative NEG mg/dL    Ketone TRACE (A) NEG mg/dL    Bilirubin Negative NEG      Blood Negative NEG      Urobilinogen 1.0 0.2 - 1.0 EU/dL    Nitrites Negative NEG      Leukocyte Esterase SMALL (A) NEG      WBC 0-4 0 - 4 /hpf    RBC 0-5 0 - 5 /hpf    Epithelial cells FEW FEW /lpf    Bacteria 1+ (A) NEG /hpf    UA:UC IF INDICATED CULTURE NOT INDICATED BY UA RESULT CNI         Radiologic Studies -   No orders to display     CT Results  (Last 48 hours)      None          CXR Results  (Last 48 hours)      None                Disposition:  Discharge     DISCHARGE NOTE:     Care plan outlined and precautions discussed.   Patient has no new complaints, changes, or physical findings. All of pt's questions and concerns were addressed. Patient was instructed and agrees to follow up with PCP, as well as to return to the ED upon further deterioration. Patient is ready to go home. Follow-up Information       Follow up With Specialties Details Why [address]  Call in 1 week As needed, If symptoms worsen [address]  [phone]            Current Discharge Medication List        START taking these medications    Details   cephALEXin (Keflex) 500 mg capsule Take 1 Capsule by mouth two (2) times a day for 7 days. Qty: 14 Capsule, Refills: 0  Start date: 12/9/2022, End date: 12/16/2022      phenazopyridine (Pyridium) 200 mg tablet Take 1 Tablet by mouth three (3) times daily as needed for Pain for up to 2 days. Qty: 6 Tablet, Refills: 0  Start date: 12/9/2022, End date: 12/11/2022      acetaminophen (TYLENOL) 325 mg tablet Take 2 Tablets by mouth every four (4) hours as needed for Pain. Qty: 20 Tablet, Refills: 0  Start date: 12/9/2022               Please note that this dictation was completed with Dragon, computer voice recognition software. Quite often unanticipated grammatical, syntax, homophones, and other interpretive errors are inadvertently transcribed by the computer software. Please disregard these errors. Additionally, please excuse any errors that have escaped final proofreading. Diagnosis     Clinical Impression:   1.  Acute cystitis without hematuria

## 2022-12-09 NOTE — ED NOTES
Patient  given copy of dc instructions and 0 paper script(s) and 3 electronic scripts. Patient  verbalized understanding of instructions and script (s). Patient given a current medication reconciliation form and verbalized understanding of their medications. Patient verbalized understanding of the importance of discussing medications with  his or her physician or clinic they will be following up with. Patient alert and oriented and in no acute distress.

## 2022-12-09 NOTE — DISCHARGE INSTRUCTIONS
It was a pleasure taking care of you at [facility] Emergency Department today. We know that when you come to [facility], you are entrusting us with your health, comfort, and safety. Our physicians and nurses honor that trust, and we truly appreciate the opportunity to care for you and your loved ones. We also value our feedback. If you receive a survey about your Emergency Department experience today, please fill it out. We care about our patients' feedback, and we listen to what you have to say. Thank you!

## 2023-01-16 ENCOUNTER — TRANSCRIBE ORDER (OUTPATIENT)
Dept: SCHEDULING | Age: 38
End: 2023-01-16

## 2023-01-16 DIAGNOSIS — M54.32 BILATERAL SCIATICA: ICD-10-CM

## 2023-01-16 DIAGNOSIS — M51.36 DISCOGENIC LOW BACK PAIN: Primary | ICD-10-CM

## 2023-01-16 DIAGNOSIS — M54.31 BILATERAL SCIATICA: ICD-10-CM

## 2023-01-16 DIAGNOSIS — M47.816 LUMBAR SPONDYLOSIS: ICD-10-CM

## 2023-01-25 ENCOUNTER — HOSPITAL ENCOUNTER (OUTPATIENT)
Dept: MRI IMAGING | Age: 38
Discharge: HOME | End: 2023-01-25
Attending: FAMILY MEDICINE
Payer: MEDICAID

## 2023-01-25 DIAGNOSIS — M51.36 DISCOGENIC LOW BACK PAIN: ICD-10-CM

## 2023-01-25 DIAGNOSIS — M54.31 BILATERAL SCIATICA: ICD-10-CM

## 2023-01-25 DIAGNOSIS — M47.816 LUMBAR SPONDYLOSIS: ICD-10-CM

## 2023-01-25 DIAGNOSIS — M54.32 BILATERAL SCIATICA: ICD-10-CM

## 2023-01-25 PROCEDURE — 72148 MRI LUMBAR SPINE W/O DYE: CPT

## 2023-01-28 ENCOUNTER — HOSPITAL ENCOUNTER (EMERGENCY)
Age: 38
Discharge: HOME | End: 2023-01-28
Attending: EMERGENCY MEDICINE
Payer: MEDICAID

## 2023-01-28 VITALS
SYSTOLIC BLOOD PRESSURE: 143 MMHG | BODY MASS INDEX: 26.52 KG/M2 | TEMPERATURE: 98.8 F | WEIGHT: 175 LBS | HEIGHT: 68 IN | OXYGEN SATURATION: 100 % | DIASTOLIC BLOOD PRESSURE: 75 MMHG | HEART RATE: 86 BPM | RESPIRATION RATE: 16 BRPM

## 2023-01-28 DIAGNOSIS — M51.37 DDD (DEGENERATIVE DISC DISEASE), LUMBOSACRAL: Primary | ICD-10-CM

## 2023-01-28 PROCEDURE — 74011250637 HC RX REV CODE- 250/637: Performed by: NURSE PRACTITIONER

## 2023-01-28 PROCEDURE — 99283 EMERGENCY DEPT VISIT LOW MDM: CPT

## 2023-01-28 RX ORDER — HYDROCODONE BITARTRATE AND ACETAMINOPHEN 5; 325 MG/1; MG/1
1 TABLET ORAL
Status: COMPLETED | OUTPATIENT
Start: 2023-01-28 | End: 2023-01-28

## 2023-01-28 RX ORDER — DEXTROMETHORPHAN HYDROBROMIDE, GUAIFENESIN 5; 100 MG/5ML; MG/5ML
650 LIQUID ORAL EVERY 8 HOURS
Qty: 20 TABLET | Refills: 0 | Status: SHIPPED | OUTPATIENT
Start: 2023-01-28

## 2023-01-28 RX ORDER — METHYLPREDNISOLONE 4 MG/1
TABLET ORAL
Qty: 1 DOSE PACK | Refills: 0 | Status: SHIPPED | OUTPATIENT
Start: 2023-01-28

## 2023-01-28 RX ORDER — LIDOCAINE 4 G/100G
PATCH TOPICAL
Qty: 20 PATCH | Refills: 0 | Status: SHIPPED | OUTPATIENT
Start: 2023-01-28

## 2023-01-28 RX ADMIN — HYDROCODONE BITARTRATE AND ACETAMINOPHEN 1 TABLET: 5; 325 TABLET ORAL at 14:06
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
## 2023-01-28 NOTE — ED NOTES
Emergency Department Nursing Plan of Care       The Nursing Plan of Care is developed from the Nursing assessment and Emergency Department Attending provider initial evaluation. The plan of care may be reviewed in the ED Provider note.     The Plan of Care was developed with the following considerations:   Patient / Family readiness to learn indicated by:verbalized understanding  Persons(s) to be included in education: patient  Barriers to Learning/Limitations:No    Signed     Mireya Garcia RN    1/28/2023   1:51 PM

## 2023-02-24 ENCOUNTER — HOSPITAL ENCOUNTER (EMERGENCY)
Age: 38
Discharge: HOME | End: 2023-02-24
Attending: EMERGENCY MEDICINE
Payer: MEDICAID

## 2023-02-24 VITALS
DIASTOLIC BLOOD PRESSURE: 76 MMHG | OXYGEN SATURATION: 98 % | HEART RATE: 93 BPM | TEMPERATURE: 98.6 F | RESPIRATION RATE: 16 BRPM | SYSTOLIC BLOOD PRESSURE: 126 MMHG

## 2023-02-24 DIAGNOSIS — J45.20 MILD INTERMITTENT ASTHMA, UNSPECIFIED WHETHER COMPLICATED: ICD-10-CM

## 2023-02-24 DIAGNOSIS — J30.9 ALLERGIC RHINITIS, UNSPECIFIED SEASONALITY, UNSPECIFIED TRIGGER: ICD-10-CM

## 2023-02-24 DIAGNOSIS — Z20.822 PERSON UNDER INVESTIGATION FOR COVID-19: Primary | ICD-10-CM

## 2023-02-24 LAB
FLUAV AG NPH QL IA: NEGATIVE
FLUBV AG NOSE QL IA: NEGATIVE
SARS-COV-2 RNA RESP QL NAA+PROBE: NOT DETECTED
SOURCE, COVRS: NORMAL

## 2023-02-24 PROCEDURE — U0005 INFEC AGEN DETEC AMPLI PROBE: HCPCS

## 2023-02-24 PROCEDURE — 87804 INFLUENZA ASSAY W/OPTIC: CPT

## 2023-02-24 PROCEDURE — 99283 EMERGENCY DEPT VISIT LOW MDM: CPT

## 2023-02-24 RX ORDER — CETIRIZINE HYDROCHLORIDE, PSEUDOEPHEDRINE HYDROCHLORIDE 5; 120 MG/1; MG/1
1 TABLET, FILM COATED, EXTENDED RELEASE ORAL 2 TIMES DAILY
Qty: 20 TABLET | Refills: 0 | Status: SHIPPED | OUTPATIENT
Start: 2023-02-24

## 2023-02-24 RX ORDER — OXYMETAZOLINE HCL 0.05 %
2 SPRAY, NON-AEROSOL (ML) NASAL 2 TIMES DAILY
Qty: 1 EACH | Refills: 0 | Status: SHIPPED | OUTPATIENT
Start: 2023-02-24 | End: 2023-02-27

## 2023-02-24 RX ORDER — METHYLPREDNISOLONE 4 MG/1
TABLET ORAL
Qty: 1 DOSE PACK | Refills: 0 | Status: SHIPPED | OUTPATIENT
Start: 2023-02-24 | End: 2023-03-03

## 2023-02-24 NOTE — ED NOTES
Patient here with c/o cough and body aches. Patient states symptoms for 2 to 3 days. Patient reports runny nose, congestion, and yellow sputum with cough. Patient denies fever. Emergency Department Nursing Plan of Care       The Nursing Plan of Care is developed from the Nursing assessment and Emergency Department Attending provider initial evaluation. The plan of care may be reviewed in the ED Provider note.     The Plan of Care was developed with the following considerations:   Patient / Family readiness to learn indicated by:verbalized understanding  Persons(s) to be included in education: patient  Barriers to Learning/Limitations:No    Signed     Vanessa Britton RN    2/24/2023   2:11 PM

## 2023-02-24 NOTE — ED TRIAGE NOTES
Pt arrives to the ED AAOX4 with a c/c of productive cough, and runny nose onset 3 days ago. Pt is noted in stable condition and in no acute distress.

## 2023-02-24 NOTE — Clinical Note
[facility] [place] EMERGENCY DEPT  [address] [id number] [phone]    Work/School Note    Date: 2/24/2023    To Whom It May concern:    Hardeep Wu was seen and treated today in the emergency room by the following provider(s):  Attending Provider: Sagar Nieves MD  Nurse Practitioner: Dannie Flor NP. Hardeep Wu is excused from work/school on 02/24/23 and 02/25/23. She is medically clear to return to work/school on 2/26/2023.        Sincerely,          Malika Cuevas NP

## 2023-02-24 NOTE — DISCHARGE INSTRUCTIONS
It was a pleasure taking care of you at [facility] Emergency Department today. We know that when you come to [facility], you are entrusting us with your health, comfort, and safety. Our physicians and nurses honor that trust, and we truly appreciate the opportunity to care for you and your loved ones. We also value our feedback. If you receive a survey about your Emergency Department experience today, please fill it out. We care about our patients' feedback, and we listen to what you have to say. Thank you!

## 2023-02-24 NOTE — ED PROVIDER NOTES
[facility] EMERGENCY DEPT  EMERGENCY DEPARTMENT ENCOUNTER       Pt Name: Adalid Vazquez  MRN: [id number]  [place] 1985  Date of evaluation: 2/24/2023  Provider: Prerna Lenz NP   PCP: None  Note Started: 1:54 PM 2/24/23     ED attending involment: I have seen and evaluated the patient. My supervision physician was available for consultation. CHIEF COMPLAINT       Chief Complaint   Patient presents with    Flu Like Symptoms        HISTORY OF PRESENT ILLNESS: 1 or more elements      History From: Patient  HPI Limitations : None     Adalid Vazquez is a 40 y.o. female who presents ***     Nursing Notes were all reviewed and agreed with or any disagreements were addressed in the HPI. REVIEW OF SYSTEMS      Review of Systems   Constitutional:  Negative for appetite change, chills, fatigue and fever. HENT:  Positive for congestion and rhinorrhea. Negative for ear pain and sore throat. Eyes:  Negative for pain and itching. Respiratory:  Positive for cough. Negative for chest tightness, shortness of breath and wheezing. Cardiovascular:  Negative for chest pain. Musculoskeletal:  Negative for arthralgias, back pain and joint swelling. Skin:  Negative for color change and rash. Neurological:  Negative for dizziness, numbness and headaches. All other systems reviewed and are negative. Positives and Pertinent negatives as per HPI.     PAST HISTORY     Past Medical History:  Past Medical History:   Diagnosis Date    Asthma     Delivery normal     Depression     Ectopic pregnancy     Hypertension     not taking medications    Miscarriage     Neurological disorder     migraines    Other ill-defined conditions(799.89)     ectopic pregnancy    Psychiatric disorder     anger        Past Surgical History:  Past Surgical History:   Procedure Laterality Date    HX CHOLECYSTECTOMY      HX GYN      right oophetrecomy    HX HERNIA REPAIR  12/5/14    Open repair right inguinal hernia with mesh    HX ORTHOPAEDIC      Right arm/leg    [place] UNLISTED PROCEDURE ABDOMEN PERITONEUM & OMENTUM         Family History:  Family History   Problem Relation Age of Onset   Grace Briones Cancer Mother         breast    Hypertension Mother     Thyroid Disease Mother     Sickle Cell Trait Father     Asthma Sister     Asthma Brother     Asthma Sister     Asthma Sister     Asthma Sister     Asthma Sister     Asthma Sister     Asthma Brother     Asthma Brother     Asthma Brother     Asthma Brother     Sickle Cell Anemia Brother     Asthma Brother     Diabetes Paternal Grandmother        Social History:  Social History     Tobacco Use    Smoking status: Former     Packs/day: 0.00     Types: Cigarettes     Quit date: 2016     Years since quittin.9    Smokeless tobacco: Never    Tobacco comments:     social smoker   Substance Use Topics    Alcohol use: Yes     Comment: occasionally    Drug use: No       Allergies: Allergies   Allergen Reactions    Ibuprofen Hives and Itching    Ibuprofen Itching       CURRENT MEDICATIONS      Discharge Medication List as of 2023  3:03 PM        CONTINUE these medications which have NOT CHANGED    Details   acetaminophen (Tylenol Arthritis Pain) 650 mg TbER Take 1 Tablet by mouth every eight (8) hours. , Normal, Disp-20 Tablet, R-0      lidocaine 4 % patch Apply one patch every 12 hours as needed for pain, Normal, Disp-20 Patch, R-0      guaiFENesin ER (MUCINEX) 600 mg ER tablet Take 1 Tablet by mouth two (2) times a day., Normal, Disp-14 Tablet, R-0      Nebulizer & Compressor machine 1 Each by Does Not Apply route every four (4) hours as needed for Wheezing. As directed, Normal, Disp-1 Each, R-0      albuterol (ACCUNEB) 1.25 mg/3 mL nebu Take 3 mL by inhalation every four (4) hours as needed for Wheezing (wheezing). , Normal, Disp-50 Each, R-0      !! albuterol (PROVENTIL HFA, VENTOLIN HFA, PROAIR HFA) 90 mcg/actuation inhaler Take 2 Puffs by inhalation every four (4) hours as needed for Wheezing., Normal, Disp-1 Each, R-1      !! albuterol (PROVENTIL HFA, VENTOLIN HFA, PROAIR HFA) 90 mcg/actuation inhaler Take 2 Puffs by inhalation every four (4) hours as needed for Wheezing., Normal, Disp-1 Inhaler, R-0       !! - Potential duplicate medications found. Please discuss with provider. PHYSICAL EXAM      ED Triage Vitals [02/24/23 1325]   ED Encounter Vitals Group      /76      Pulse (Heart Rate) 93      Resp Rate 16      Temp 98.6 °F (37 °C)      Temp src       O2 Sat (%) 98 %      Weight       Height         Physical Exam  Vitals and nursing note reviewed. Constitutional:       General: She is not in acute distress. Appearance: She is well-developed. She is not ill-appearing. HENT:      Head: Normocephalic and atraumatic. Cardiovascular:      Rate and Rhythm: Normal rate and regular rhythm. Pulses: Normal pulses. Heart sounds: Normal heart sounds. Pulmonary:      Effort: Pulmonary effort is normal.      Breath sounds: Normal breath sounds. Abdominal:      Palpations: Abdomen is soft. Musculoskeletal:         General: Normal range of motion. Cervical back: Normal range of motion and neck supple. Skin:     General: Skin is warm and dry. Neurological:      Mental Status: She is alert and oriented to person, place, and time. GCS: GCS eye subscore is 4. GCS verbal subscore is 5. GCS motor subscore is 6.         DIAGNOSTIC RESULTS   LABS:     Recent Results (from the past 96 hour(s))   SARS-COV-2    Collection Time: 02/24/23  1:58 PM   Result Value Ref Range    Specimen source Nasopharyngeal      SARS-CoV-2 Not detected NOTD     INFLUENZA A+B VIRAL AGS    Collection Time: 02/24/23  1:58 PM   Result Value Ref Range    Influenza A Antigen Negative NEG      Influenza B Antigen Negative NEG             RADIOLOGY:  Non-plain film images such as CT, Ultrasound and MRI are read by the radiologist. Plain radiographic images are visualized and preliminarily interpreted by the ED Provider with the below findings:        Interpretation per the Radiologist below, if available at the time of this note:     No results found. PROCEDURES   Unless otherwise noted below, none  Procedures     EMERGENCY DEPARTMENT COURSE and DIFFERENTIAL DIAGNOSIS/MDM   Vitals:    Vitals:    02/24/23 1325   BP: 126/76   Pulse: 93   Resp: 16   Temp: 98.6 °F (37 °C)   SpO2: 98%        Patient was given the following medications:  Medications - No data to display    CONSULTS: (Who and What was discussed)  None    Chronic Conditions: none      Social Determinants affecting Dx or Tx: None    Records Reviewed (source and summary): Prior medical records, Previous Laboratory studies, and Nursing notes    MDM (CC/HPI Summary, DDx, ED Course, Reassessment, Disposition Considerations -Tests not done, Shared Decision Making, Pt Expectation of Test or Tx.):     44-year-old female presenting with flulike symptoms exhibiting bronchospastic cough but no wheezing on exam.  Patient arrives afebrile. FINAL IMPRESSION     1. Person under investigation for COVID-19    2. Mild intermittent asthma, unspecified whether complicated    3. Allergic rhinitis, unspecified seasonality, unspecified trigger          DISPOSITION/PLAN   Discharged        Care plan outlined and precautions discussed. Patient has no new complaints, changes, or physical findings. All of pt's questions and concerns were addressed. Patient was instructed and agrees to follow up with PCP, as well as to return to the ED upon further deterioration. Patient is ready to go home.            PATIENT REFERRED TO:  Follow-up Information       Follow up With Specialties Details Why [address]  Call in 1 week As needed, If symptoms worsen [address] [facility] [place], [address] [place] [address] Drive  [phone]              DISCHARGE MEDICATIONS:  Discharge Medication List as of 2/24/2023  3:03 PM        START taking these medications    Details   oxymetazoline (Afrin, oxymetazoline,) 0.05 % nasal spray 2 Sprays by Both Nostrils route two (2) times a day for 3 days. , Normal, Disp-1 Each, R-0           CONTINUE these medications which have CHANGED    Details   cetirizine-pseudoePHEDrine (ZyrTEC-D) 5-120 mg Tb12 Take 1 Tablet by mouth two (2) times a day., Normal, Disp-20 Tablet, R-0      methylPREDNISolone (Medrol, Low,) 4 mg tablet As directed, Normal, Disp-1 Dose Pack, R-0           CONTINUE these medications which have NOT CHANGED    Details   acetaminophen (Tylenol Arthritis Pain) 650 mg TbER Take 1 Tablet by mouth every eight (8) hours. , Normal, Disp-20 Tablet, R-0      lidocaine 4 % patch Apply one patch every 12 hours as needed for pain, Normal, Disp-20 Patch, R-0      guaiFENesin ER (MUCINEX) 600 mg ER tablet Take 1 Tablet by mouth two (2) times a day., Normal, Disp-14 Tablet, R-0      Nebulizer & Compressor machine 1 Each by Does Not Apply route every four (4) hours as needed for Wheezing. As directed, Normal, Disp-1 Each, R-0      albuterol (ACCUNEB) 1.25 mg/3 mL nebu Take 3 mL by inhalation every four (4) hours as needed for Wheezing (wheezing). , Normal, Disp-50 Each, R-0      !! albuterol (PROVENTIL HFA, VENTOLIN HFA, PROAIR HFA) 90 mcg/actuation inhaler Take 2 Puffs by inhalation every four (4) hours as needed for Wheezing., Normal, Disp-1 Each, R-1      !! albuterol (PROVENTIL HFA, VENTOLIN HFA, PROAIR HFA) 90 mcg/actuation inhaler Take 2 Puffs by inhalation every four (4) hours as needed for Wheezing., Normal, Disp-1 Inhaler, R-0       !! - Potential duplicate medications found. Please discuss with provider. DISCONTINUED MEDICATIONS:  Discharge Medication List as of 2/24/2023  3:03 PM          I am the Primary Clinician of Record.      N'Deevaw Mason, NP (electronically signed)    (Please note that parts of this dictation were completed with voice recognition software. Quite often unanticipated grammatical, syntax, homophones, and other interpretive errors are inadvertently transcribed by the computer software. Please disregards these errors.  Please excuse any errors that have escaped final proofreading.)

## 2023-03-03 RX ORDER — DULOXETIN HYDROCHLORIDE 30 MG/1
30 CAPSULE, DELAYED RELEASE ORAL DAILY
COMMUNITY
Start: 2023-02-06 | End: 2023-03-08

## 2023-03-03 RX ORDER — LORATADINE 10 MG/1
10 TABLET ORAL DAILY
COMMUNITY

## 2023-03-03 RX ORDER — DICLOFENAC SODIUM 75 MG/1
75 TABLET, DELAYED RELEASE ORAL 2 TIMES DAILY
COMMUNITY
Start: 2022-09-08

## 2023-03-03 RX ORDER — PREGABALIN 50 MG/1
CAPSULE ORAL
COMMUNITY
Start: 2023-01-16

## 2023-03-03 RX ORDER — CYCLOBENZAPRINE HCL 10 MG
TABLET ORAL
COMMUNITY
Start: 2023-02-04

## 2023-03-03 RX ORDER — TRAMADOL HYDROCHLORIDE 50 MG/1
TABLET ORAL
COMMUNITY
Start: 2023-02-04

## 2023-03-03 RX ORDER — FLUOXETINE HYDROCHLORIDE 40 MG/1
40 CAPSULE ORAL DAILY
COMMUNITY
Start: 2023-02-04

## 2023-03-03 RX ORDER — SUMATRIPTAN 100 MG/1
100 TABLET, FILM COATED ORAL
COMMUNITY
Start: 2022-12-05

## 2023-03-03 RX ORDER — BUDESONIDE AND FORMOTEROL FUMARATE DIHYDRATE 80; 4.5 UG/1; UG/1
AEROSOL RESPIRATORY (INHALATION)
COMMUNITY
Start: 2023-02-04

## 2023-03-03 NOTE — PERIOP NOTES
[facility]  Ambulatory Surgery Unit  Pre-operative Instructions    Procedure Date  Tuesday, March 14, 2023            Tentative Arrival Time 1230      1. On the day of your procedure, please report to the Ambulatory Surgery Unit Registration Desk and sign in at your designated time. The Ambulatory Surgery Unit is located in [facility] on the [facility] side of the [facility] across from the [address] [place] [address]. Please have all of your health insurance cards, copayment, and a photo ID.    **TWO adults may accompany you the day of the procedure. We have limited seating available. If our waiting room is at capacity, your ride may be asked to remain in their vehicle. No one under 15 is allowed in the waiting room. 2. You must have someone with you to drive you home as directed by your surgeon. 3. You may have a light breakfast and take normal morning medications. 4. We recommend you do not drink any alcoholic beverages for 24 hours before and after your procedure. 5. Contact your surgeons office for instructions on the following medications: non-steroidal anti-inflammatory drugs (i.e. Advil, Aleve), vitamins, and supplements. (Some surgeons will want you to stop these medications prior to surgery and others may allow you to take them)   **If you are currently taking Plavix, Coumadin, Aspirin and/or other blood-thinning agents, contact your surgeon for instructions. ** Your surgeon will partner with the physician prescribing these medications to determine if it is safe to stop or if you need to continue taking. Please do not stop taking these medications without instructions from your surgeon. 6. In an effort to help prevent surgical site infection, we ask that you shower with an anti-bacterial soap (i.e. Dial or Safeguard) on the morning of your procedure. Do not apply any lotions, powders, or deodorants after showering. 7. Wear comfortable clothes.  Wear glasses instead of contacts. Do not bring any jewelry or money (other than copays or fees as instructed). Do not wear make-up, particularly mascara, the morning of your procedure. Wear your hair loose or down, no ponytails, buns, abdiel pins or clips. All body piercings must be removed. 8. You should understand that if you do not follow these instructions your procedure may be cancelled. If your physical condition changes (i.e. fever, cold or flu) please contact your surgeon as soon as possible. 9. It is important that you be on time. If a situation occurs where you may be late, or if you have any questions or problems, please call [phone].    10. Your procedure time may be subject to change. You will receive a phone call the day prior to confirm your arrival time. I understand a pre-operative phone call will be made to verify my procedure time. In the event that I am not available, I give permission for a message to be left on my answering service and/or with another person?       yes    Preop instructions reviewed  Pt verbalized understanding.       ___________________      ___________________      ___________________  (Signature of Patient)          (Witness)                   (Date and Time)

## 2023-03-14 ENCOUNTER — APPOINTMENT (OUTPATIENT)
Dept: GENERAL RADIOLOGY | Age: 38
End: 2023-03-14
Attending: PHYSICAL MEDICINE & REHABILITATION
Payer: MEDICAID

## 2023-03-14 ENCOUNTER — HOSPITAL ENCOUNTER (OUTPATIENT)
Age: 38
Setting detail: OUTPATIENT SURGERY
Discharge: HOME | End: 2023-03-14
Attending: PHYSICAL MEDICINE & REHABILITATION | Admitting: PHYSICAL MEDICINE & REHABILITATION
Payer: MEDICAID

## 2023-03-14 VITALS
OXYGEN SATURATION: 100 % | SYSTOLIC BLOOD PRESSURE: 137 MMHG | DIASTOLIC BLOOD PRESSURE: 84 MMHG | BODY MASS INDEX: 26.52 KG/M2 | WEIGHT: 175 LBS | RESPIRATION RATE: 16 BRPM | HEART RATE: 69 BPM | TEMPERATURE: 98.2 F | HEIGHT: 68 IN

## 2023-03-14 LAB — HCG UR QL: NEGATIVE

## 2023-03-14 PROCEDURE — 76000 FLUOROSCOPY <1 HR PHYS/QHP: CPT

## 2023-03-14 PROCEDURE — 74011000636 HC RX REV CODE- 636: Performed by: PHYSICAL MEDICINE & REHABILITATION

## 2023-03-14 PROCEDURE — 74011250636 HC RX REV CODE- 250/636: Performed by: PHYSICAL MEDICINE & REHABILITATION

## 2023-03-14 PROCEDURE — 74011000250 HC RX REV CODE- 250: Performed by: PHYSICAL MEDICINE & REHABILITATION

## 2023-03-14 PROCEDURE — 72020 X-RAY EXAM OF SPINE 1 VIEW: CPT

## 2023-03-14 PROCEDURE — 76030000002 HC AMB SURG OR TIME FIRST 0.: Performed by: PHYSICAL MEDICINE & REHABILITATION

## 2023-03-14 PROCEDURE — 81025 URINE PREGNANCY TEST: CPT

## 2023-03-14 PROCEDURE — 77030003665 HC NDL SPN BBMI -A: Performed by: PHYSICAL MEDICINE & REHABILITATION

## 2023-03-14 PROCEDURE — 2709999900 HC NON-CHARGEABLE SUPPLY: Performed by: PHYSICAL MEDICINE & REHABILITATION

## 2023-03-14 PROCEDURE — 76210000046 HC AMBSU PH II REC FIRST 0.5 HR: Performed by: PHYSICAL MEDICINE & REHABILITATION

## 2023-03-14 RX ORDER — BUPIVACAINE HYDROCHLORIDE 5 MG/ML
10 INJECTION, SOLUTION EPIDURAL; INTRACAUDAL ONCE
Status: DISCONTINUED | OUTPATIENT
Start: 2023-03-14 | End: 2023-03-14 | Stop reason: HOSPADM

## 2023-03-14 RX ORDER — DEXAMETHASONE SODIUM PHOSPHATE 4 MG/ML
6 INJECTION, SOLUTION INTRA-ARTICULAR; INTRALESIONAL; INTRAMUSCULAR; INTRAVENOUS; SOFT TISSUE ONCE
Status: COMPLETED | OUTPATIENT
Start: 2023-03-14 | End: 2023-03-14

## 2023-03-14 RX ORDER — LIDOCAINE HYDROCHLORIDE 20 MG/ML
10 INJECTION, SOLUTION INFILTRATION; PERINEURAL ONCE
Status: COMPLETED | OUTPATIENT
Start: 2023-03-14 | End: 2023-03-14

## 2023-03-14 NOTE — H&P
Procedural Case Note    3/14/2023    (1:18 PM)    Carlene Alexander    1985   (40 y.o.)    [id number]    CC:  pain    ROS:   Complete ROS obtained, no CP, no SOB, no N or V    PMH:     Past Medical History:   Diagnosis Date    Anger     Asthma     Delivery normal     Depression     Ectopic pregnancy     Hypertension     not taking medications    Migraines     Miscarriage        ALLERGIES:     Allergies   Allergen Reactions    Ibuprofen Hives and Itching       MEDS:     Current Facility-Administered Medications   Medication Dose Route Frequency    BUPivacaine (PF) (MARCAINE) 0.5 % (5 mg/mL) injection 50 mg  10 mL Epidural ONCE    lidocaine (XYLOCAINE) 20 mg/mL (2 %) injection 200 mg  10 mL IntraDERMal ONCE    dexamethasone (DECADRON) 4 mg/mL injection 6 mg  6 mg Intra artICUlar ONCE    iohexoL (OMNIPAQUE) 180 mg iodine/mL injection 10 mL  10 mL Intra artICUlar ONCE        Visit Vitals  /84 (BP 1 Location: Right upper arm, BP Patient Position: At rest)   Pulse 91   Temp 98.5 °F (36.9 °C)   Resp 15   Ht 5' 8\" (1.727 m)   Wt 79.4 kg (175 lb)   SpO2 100%   BMI 26.61 kg/m²     PE:  Gen: NAD  Head: normocephalic  Heart: RRR  Lungs: CTA rosendo  Abd: NT, ND, soft  Neuro: awake and alert  Skin: intact    IMPRESSION:   Lumbar radiculopathy    Note:  The clinical status was discussed in detail with the patient. The procedure was again discussed and described in detail. All understand and accept the planned procedure and risks; reject other forms of treatment. All questions are answered.     Leonie Lambert MD

## 2023-03-14 NOTE — DISCHARGE INSTRUCTIONS
Dr. Amilcar Lin Discharge Instructions  Transforaminal Epidural Steroid Injection/ Selective Nerve Block    You had a transforaminal epidural steroid injection/ selective nerve block today. You will probably have some numbness, and possibly weakness, in your leg for the next 6 to 8 hours. The steroids will slowly become effective, reducing your pain, over the next 2 weeks. You should begin feeling better after a few days, but it may take up to 2 weeks to notice the difference. The benefit you get from your injection will last a variable amount of time, depending on the severity of your lumbar spine problem. Pain: Most people do not have any increase in pain after this injection. However, you might experience some soreness in your low back. If this happens, putting an ice pack over the sore area will help. Bandage: You will have a small bandage covering the site of the injection. You may remove it once you get home. Restrictions: Someone should drive you home after the injection. After that, you have no restrictions. You need to be careful while walking, as you may still have some numbness or weakness in your leg. You may resume your normal level of activity. You may take a shower or bath, and you may eat normally. You should continue your current exercises and/or therapy routine. Medications: Continue your current medications as prescribed. If your pain decreases, you may reduce the amount of your pain medicines. If you stopped taking anticoagulants or blood-thinners before the injection, start them tomorrow. If you have diabetes, your blood sugar may be elevated for a few days. Call your primary doctor with any questions.   Call Dr. Amilcar Lin at [phone] if you experience:  Fever (101 degrees Fahrenheit or greater)  Nausea or vomiting  Headache unrelieved by your normal pain medicine  Redness or swelling at the injection site that lasts more than 1 day  New numbness, tingling, weakness, or pain that you didnt have before the injection    Follow-up appointment:   If still having pain in 1-2 weeks, call office at [phone] for a follow up appointment. DISCHARGE SUMMARY from Nurse    The following personal items collected during your admission are returned to you:   Dental Appliance: Dental Appliances: None  Vision: Visual Aid: None  Hearing Aid:    Jewelry:    Clothing:    Other Valuables:    Valuables sent to safe: If you were given prescriptions, please review the written information on prescribed medications. You will receive a Post Operative Call from one of the Recovery Room Nurses on the day after your surgery to check on you. It is very important for us to know how you are recovering after your surgery. You may receive an e-mail or letter in the mail from [employer] regarding your experience with us in the Ambulatory Surgery Unit. Your feedback is valuable to us and we appreciate your participation in the survey. If you have not had your influenza or pneumococcal vaccines, please follow up with your primary care physician. The discharge information has been reviewed with the patient. The patient verbalized understanding.

## 2023-03-14 NOTE — PERIOP NOTES
Carlene Alexander  1985  [id number]    Situation:  Verbal report given from: suly whitehead rn  Procedure: Procedure(s):  BILATERAL L5-S1 TRANSFORAMINAL EPIDURAL STEROID INJECTION    Background:    Preoperative diagnosis: RADICULOPATHY/SPONDYLOSIS    Postoperative diagnosis: RADICULOPATHY/SPONDYLOSIS    :  Dr. Virgilio Diallo:  Intra-procedure medications, procedure, and allergies reviewed        Recommendation:    Discharge patient home after discharge instructions reviewed with patient. Rest until local has worn off.

## 2023-03-14 NOTE — PERIOP NOTES
All d/c instructions have been reviewed with pt, verbalized understanding. Pt A+Ox4. Assisted dressing, transferred to wheelchair with minimal assist.  D/c'd to car home with .   All belongings with pt, including phone

## 2023-03-14 NOTE — OP NOTES
Operative Note    Patient: Damari Dhaliwal  YOB: 1985  MRN: [id number]    Date of Procedure: 3/14/2023   Epidural Steroid Injection Operative Report    Indications: This is a 40 y.o. female who presents with low back pain and radiculopathy. She was positive for LS DDD with radiculopathy. The patient was admitted for spinal intervention as conservative measures have failed. Date of Surgery: 3/14/2023    Preoperative Diagnosis: LS DDD with Radiculopathy    Postoperative Diagnosis: LS DDD with Radiculopathy    Surgeon(s) and Role:     * Carol Ulrich MD - Primary     Procedure:  Procedure(s):  BILATERAL L5-S1 TRANSFORAMINAL EPIDURAL STEROID INJECTION    Procedure in Detail:  After appropriate informed consent was obtained, the patient was taken to the operating suite and placed in the prone position on the operating table on appropriate padding. The LS region was prepped and draped in the usual sterile fashion. Intraoperative fluoroscopy was used to localize the LS spine. The skin was infiltrated with 2% lidocaine. A 25-g needle was advanced into the [place] L5 neuroforamen under fluoroscopic guidance. A small amount of contrast was injected into the epidural space, confirming appropriate needle placement on fluoroscopy. Permanent fluoroscopic images were saved in the patient's record. Next, 2 ml of 2% lidocaine and 10 mg of Dexamethasone were injected. The needle was removed from the patient. The patient was then turned back into the supine position on the stretcher and was taken to the Recovery Room in stable condition.     Estimated Blood Loss:  none     Specimens: None       Drains: None          Complications:  None    Signed By: Marjorie Dickey MD                        March 14, 2023        Electronically Signed by Marjorie Dickey MD on 3/14/2023 at 2:52 PM

## 2023-05-20 ENCOUNTER — HOSPITAL ENCOUNTER (EMERGENCY)
Facility: HOSPITAL | Age: 38
Discharge: HOME | End: 2023-05-20
Attending: EMERGENCY MEDICINE
Payer: COMMERCIAL

## 2023-05-20 VITALS
RESPIRATION RATE: 14 BRPM | DIASTOLIC BLOOD PRESSURE: 84 MMHG | SYSTOLIC BLOOD PRESSURE: 136 MMHG | HEIGHT: 68 IN | TEMPERATURE: 98.4 F | WEIGHT: 181 LBS | HEART RATE: 87 BPM | OXYGEN SATURATION: 98 % | BODY MASS INDEX: 27.43 KG/M2

## 2023-05-20 DIAGNOSIS — G89.29 CHRONIC BILATERAL LOW BACK PAIN WITHOUT SCIATICA: Primary | ICD-10-CM

## 2023-05-20 DIAGNOSIS — M54.50 CHRONIC BILATERAL LOW BACK PAIN WITHOUT SCIATICA: Primary | ICD-10-CM

## 2023-05-20 LAB
APPEARANCE UR: CLEAR
BACTERIA URNS QL MICRO: NEGATIVE /HPF
BILIRUB UR QL: NEGATIVE
COLOR UR: ABNORMAL
EPITH CASTS URNS QL MICRO: ABNORMAL /LPF
GLUCOSE UR STRIP.AUTO-MCNC: NEGATIVE MG/DL
HGB UR QL STRIP: NEGATIVE
KETONES UR QL STRIP.AUTO: ABNORMAL MG/DL
LEUKOCYTE ESTERASE UR QL STRIP.AUTO: ABNORMAL
NITRITE UR QL STRIP.AUTO: NEGATIVE
PH UR STRIP: 5.5 (ref 5–8)
PROT UR STRIP-MCNC: NEGATIVE MG/DL
RBC #/AREA URNS HPF: ABNORMAL /HPF (ref 0–5)
SP GR UR REFRACTOMETRY: 1.02
URINE CULTURE IF INDICATED: ABNORMAL
UROBILINOGEN UR QL STRIP.AUTO: 1 EU/DL (ref 0.2–1)
WBC URNS QL MICRO: ABNORMAL /HPF (ref 0–4)

## 2023-05-20 PROCEDURE — 81001 URINALYSIS AUTO W/SCOPE: CPT

## 2023-05-20 PROCEDURE — 6360000002 HC RX W HCPCS: Performed by: NURSE PRACTITIONER

## 2023-05-20 PROCEDURE — 96372 THER/PROPH/DIAG INJ SC/IM: CPT

## 2023-05-20 PROCEDURE — 99284 EMERGENCY DEPT VISIT MOD MDM: CPT

## 2023-05-20 RX ORDER — KETOROLAC TROMETHAMINE 30 MG/ML
30 INJECTION, SOLUTION INTRAMUSCULAR; INTRAVENOUS
Status: COMPLETED | OUTPATIENT
Start: 2023-05-20 | End: 2023-05-20

## 2023-05-20 RX ORDER — DIFLUNISAL 500 MG/1
500 TABLET, FILM COATED ORAL 2 TIMES DAILY
Qty: 30 TABLET | Refills: 0 | Status: SHIPPED | OUTPATIENT
Start: 2023-05-20

## 2023-05-20 RX ADMIN — KETOROLAC TROMETHAMINE 30 MG: 30 INJECTION, SOLUTION INTRAMUSCULAR; INTRAVENOUS at 16:30

## 2023-05-20 ASSESSMENT — PAIN DESCRIPTION - DESCRIPTORS: DESCRIPTORS: ACHING;SORE

## 2023-05-20 ASSESSMENT — ENCOUNTER SYMPTOMS
BACK PAIN: 1
ABDOMINAL PAIN: 0
SHORTNESS OF BREATH: 0

## 2023-05-20 ASSESSMENT — PAIN DESCRIPTION - PAIN TYPE: TYPE: ACUTE PAIN;CHRONIC PAIN

## 2023-05-20 ASSESSMENT — PAIN DESCRIPTION - ORIENTATION: ORIENTATION: LOWER;RIGHT;LEFT

## 2023-05-20 ASSESSMENT — PAIN SCALES - GENERAL: PAINLEVEL_OUTOF10: 9

## 2023-05-20 ASSESSMENT — PAIN DESCRIPTION - LOCATION: LOCATION: BACK

## 2023-05-20 ASSESSMENT — PAIN - FUNCTIONAL ASSESSMENT: PAIN_FUNCTIONAL_ASSESSMENT: 0-10

## 2023-05-20 NOTE — ED NOTES
Patient reports low back pain and increased frequency of urination for two days.      Florin Walter RN  05/20/23 5894

## 2023-05-20 NOTE — ED TRIAGE NOTES
Left and right lower back pain, chronic. History of arthritis. Also reports she once had UTI with back pain. No known recent injury.

## 2023-05-20 NOTE — PROGRESS NOTES
Discharge instructions discussed with patient. Patient expressed understanding of the instructions provided. Patient was awake and alert when he ambulated out of the ED at time of discharge.

## 2023-07-18 ENCOUNTER — HOSPITAL ENCOUNTER (EMERGENCY)
Facility: HOSPITAL | Age: 38
Discharge: HOME | End: 2023-07-18
Payer: MEDICAID

## 2023-07-18 VITALS
HEIGHT: 68 IN | RESPIRATION RATE: 16 BRPM | DIASTOLIC BLOOD PRESSURE: 85 MMHG | WEIGHT: 180.78 LBS | BODY MASS INDEX: 27.4 KG/M2 | HEART RATE: 72 BPM | TEMPERATURE: 98.2 F | SYSTOLIC BLOOD PRESSURE: 141 MMHG | OXYGEN SATURATION: 99 %

## 2023-07-18 DIAGNOSIS — M54.16 LUMBAR RADICULAR PAIN: Primary | ICD-10-CM

## 2023-07-18 DIAGNOSIS — M54.42 CHRONIC LOW BACK PAIN WITH LEFT-SIDED SCIATICA, UNSPECIFIED BACK PAIN LATERALITY: ICD-10-CM

## 2023-07-18 DIAGNOSIS — G89.29 CHRONIC LOW BACK PAIN WITH LEFT-SIDED SCIATICA, UNSPECIFIED BACK PAIN LATERALITY: ICD-10-CM

## 2023-07-18 PROCEDURE — 99283 EMERGENCY DEPT VISIT LOW MDM: CPT

## 2023-07-18 PROCEDURE — 6370000000 HC RX 637 (ALT 250 FOR IP): Performed by: PHYSICIAN ASSISTANT

## 2023-07-18 RX ORDER — NAPROXEN 250 MG/1
250 TABLET ORAL 2 TIMES DAILY
Qty: 14 TABLET | Refills: 0 | Status: SHIPPED | OUTPATIENT
Start: 2023-07-18 | End: 2023-07-25

## 2023-07-18 RX ORDER — CYCLOBENZAPRINE HCL 10 MG
10 TABLET ORAL 3 TIMES DAILY PRN
Status: DISCONTINUED | OUTPATIENT
Start: 2023-07-18 | End: 2023-07-18 | Stop reason: HOSPADM

## 2023-07-18 RX ORDER — NAPROXEN 250 MG/1
250 TABLET ORAL
Status: COMPLETED | OUTPATIENT
Start: 2023-07-18 | End: 2023-07-18

## 2023-07-18 RX ORDER — CYCLOBENZAPRINE HCL 10 MG
10 TABLET ORAL 3 TIMES DAILY PRN
Qty: 21 TABLET | Refills: 0 | Status: SHIPPED | OUTPATIENT
Start: 2023-07-18 | End: 2023-07-25

## 2023-07-18 RX ADMIN — CYCLOBENZAPRINE 10 MG: 10 TABLET, FILM COATED ORAL at 15:26

## 2023-07-18 RX ADMIN — NAPROXEN 250 MG: 250 TABLET ORAL at 15:27

## 2023-07-18 ASSESSMENT — ENCOUNTER SYMPTOMS: BACK PAIN: 1

## 2023-07-18 ASSESSMENT — PAIN SCALES - GENERAL: PAINLEVEL_OUTOF10: 8

## 2023-07-18 NOTE — ED PROVIDER NOTES
[facility] EMERGENCY DEPT  EMERGENCY DEPARTMENT ENCOUNTER       Pt Name: Mikayla Dubois  MRN: [id number]  [address] 1985  Date of evaluation: 7/18/2023  Provider: JENNY Hansen   PCP: No primary care provider on file. Note Started: 4:06 PM 7/18/23     CHIEF COMPLAINT       Chief Complaint   Patient presents with    Leg Pain     Left leg starting in hip going down. No injury        HISTORY OF PRESENT ILLNESS: 1 or more elements      History From: Patient, partner at bedside  HPI Limitations: None     Mikayla Dubois is a 40 y.o. female who presents BIB self with CC of lumbar back pain, left hip pain, with radiation down the lateral and anterior left thigh. The pain started last night when she was laying down at home. She denies trauma, injury, recent exertion that may have triggered this pain. She states she has had this pain for a long time, and it flares up intermittently. She is established with Nadeem Murguia and has been diagnosed with lumbar spondylosis and discogenic low back pain. She underwent epidural steroid injections in March of this year, but states it did not help. She has not taken any medications at home for her pain. She denies loss of bladder or bowel function, saddle anesthesia, lower extremity numbness/tingling/weakness. No fevers, IVDU, malignancy. LMP 1 week ago. Denies dysuria, urinary frequency or urgency, abnormal vaginal discharge, vaginal bleeding, change in bowel movements, abdominal pain, appetite change, nausea/vomiting. Nursing Notes were all reviewed and agreed with or any disagreements were addressed in the HPI. REVIEW OF SYSTEMS      Review of Systems   Musculoskeletal:  Positive for back pain. Negative for gait problem. L leg pain      Positives and Pertinent negatives as per HPI.     PAST HISTORY     Past Medical History:  Past Medical History:   Diagnosis Date    Anger     Asthma     Delivery normal     Depression     Ectopic pregnancy     Hypertension

## 2023-07-27 ENCOUNTER — HOSPITAL ENCOUNTER (EMERGENCY)
Facility: HOSPITAL | Age: 38
Discharge: HOME | End: 2023-07-27
Payer: MEDICAID

## 2023-07-27 VITALS
WEIGHT: 182 LBS | HEART RATE: 77 BPM | HEIGHT: 68 IN | OXYGEN SATURATION: 98 % | BODY MASS INDEX: 27.58 KG/M2 | DIASTOLIC BLOOD PRESSURE: 75 MMHG | SYSTOLIC BLOOD PRESSURE: 129 MMHG | RESPIRATION RATE: 17 BRPM | TEMPERATURE: 98 F

## 2023-07-27 DIAGNOSIS — H92.01 ACUTE OTALGIA, RIGHT: Primary | ICD-10-CM

## 2023-07-27 PROCEDURE — 99283 EMERGENCY DEPT VISIT LOW MDM: CPT

## 2023-07-27 RX ORDER — PSEUDOEPHEDRINE HCL 30 MG
30 TABLET ORAL EVERY 6 HOURS PRN
Qty: 12 TABLET | Refills: 1 | Status: SHIPPED | OUTPATIENT
Start: 2023-07-27 | End: 2023-08-03

## 2023-07-27 RX ORDER — FLUTICASONE PROPIONATE 50 MCG
1 SPRAY, SUSPENSION (ML) NASAL DAILY
Qty: 10 G | Refills: 0 | Status: SHIPPED | OUTPATIENT
Start: 2023-07-27

## 2023-07-27 ASSESSMENT — PAIN DESCRIPTION - LOCATION: LOCATION: EAR

## 2023-07-27 ASSESSMENT — PAIN - FUNCTIONAL ASSESSMENT: PAIN_FUNCTIONAL_ASSESSMENT: 0-10

## 2023-07-27 ASSESSMENT — PAIN DESCRIPTION - DESCRIPTORS: DESCRIPTORS: ACHING

## 2023-07-27 ASSESSMENT — PAIN DESCRIPTION - ORIENTATION: ORIENTATION: RIGHT;INNER

## 2023-07-27 ASSESSMENT — PAIN SCALES - GENERAL: PAINLEVEL_OUTOF10: 10

## 2023-07-28 NOTE — ED PROVIDER NOTES
Age of Onset    Cancer Mother         breast    Hypertension Mother     Diabetes Paternal Grandmother     Asthma Brother     Sickle Cell Anemia Brother     Asthma Brother     Asthma Brother     Asthma Brother     Asthma Brother     Asthma Sister     Thyroid Disease Mother     Sickle Cell Trait Father     Asthma Sister     Asthma Brother     Asthma Sister     Asthma Sister     Asthma Sister     Asthma Sister        Social History:  Social History     Tobacco Use    Smoking status: Former     Packs/day: 0.00     Types: Cigarettes     Quit date: 2016     Years since quittin.3    Smokeless tobacco: Never   Substance Use Topics    Alcohol use: Yes    Drug use: No       Allergies: Allergies   Allergen Reactions    Ibuprofen Hives and Itching       CURRENT MEDICATIONS      Discharge Medication List as of 2023  9:23 PM        CONTINUE these medications which have NOT CHANGED    Details   naproxen (NAPROSYN) 250 MG tablet Take 1 tablet by mouth 2 times daily for 7 days, Disp-14 tablet, R-0Normal      diflunisal (DOLOBID) 500 MG TABS Take 1 tablet by mouth 2 times daily, Disp-30 tablet, R-0Normal      acetaminophen (TYLENOL) 650 MG extended release tablet Take 650 mg by mouth in the morning and 650 mg at noon and 650 mg in the evening. Historical Med      albuterol (ACCUNEB) 1.25 MG/3ML nebulizer solution Inhale 1.25 mg into the lungs every 4 hours as neededHistorical Med      albuterol sulfate HFA (PROVENTIL;VENTOLIN;PROAIR) 108 (90 Base) MCG/ACT inhaler Inhale 2 puffs into the lungs every 4 hours as neededHistorical Med      budesonide-formoterol (SYMBICORT) 80-4.5 MCG/ACT AERO TAKE 1 PUFF BY MOUTH TWICE A DAYHistorical Med      cetirizine-psuedoephedrine (ZYRTEC-D) 5-120 MG per extended release tablet Take 1 tablet by mouth 2 times dailyHistorical Med      cyclobenzaprine (FLEXERIL) 10 MG tablet TAKE 1 TABLET BY MOUTH THREE TIMES A DAY AS NEEDEDHistorical Med      diclofenac (VOLTAREN) 75 MG EC tablet Take 75

## 2023-07-28 NOTE — ED NOTES
Patient discharge instructions were reviewed with the patient. The patient did not have any further qeustions at this time. Pt is leaving dept in stable condition.        Nora Toussaint RN  07/27/23 2127

## 2023-07-28 NOTE — ED TRIAGE NOTES
Pt presents to the ED c/o right inner earache. No other complaints. 500mg PO Tylenol taken at 0900 today.

## 2023-10-10 ENCOUNTER — HOSPITAL ENCOUNTER (EMERGENCY)
Facility: HOSPITAL | Age: 38
Discharge: HOME | End: 2023-10-10
Payer: MEDICAID

## 2023-10-10 VITALS
WEIGHT: 180 LBS | OXYGEN SATURATION: 100 % | HEART RATE: 96 BPM | RESPIRATION RATE: 18 BRPM | SYSTOLIC BLOOD PRESSURE: 151 MMHG | DIASTOLIC BLOOD PRESSURE: 83 MMHG | BODY MASS INDEX: 28.25 KG/M2 | HEIGHT: 67 IN | TEMPERATURE: 98 F

## 2023-10-10 DIAGNOSIS — R09.81 NASAL CONGESTION: ICD-10-CM

## 2023-10-10 DIAGNOSIS — L02.411 ABSCESS OF AXILLA, RIGHT: Primary | ICD-10-CM

## 2023-10-10 PROCEDURE — 99283 EMERGENCY DEPT VISIT LOW MDM: CPT

## 2023-10-10 PROCEDURE — 2500000003 HC RX 250 WO HCPCS

## 2023-10-10 PROCEDURE — 10060 I&D ABSCESS SIMPLE/SINGLE: CPT

## 2023-10-10 RX ORDER — CEPHALEXIN 500 MG/1
500 CAPSULE ORAL 4 TIMES DAILY
Qty: 28 CAPSULE | Refills: 0 | Status: SHIPPED | OUTPATIENT
Start: 2023-10-10 | End: 2023-10-17

## 2023-10-10 RX ORDER — SULFAMETHOXAZOLE AND TRIMETHOPRIM 800; 160 MG/1; MG/1
1 TABLET ORAL 2 TIMES DAILY
Qty: 20 TABLET | Refills: 0 | Status: SHIPPED | OUTPATIENT
Start: 2023-10-10 | End: 2023-10-20

## 2023-10-10 RX ORDER — LIDOCAINE HYDROCHLORIDE AND EPINEPHRINE 10; 10 MG/ML; UG/ML
20 INJECTION, SOLUTION INFILTRATION; PERINEURAL ONCE
Status: COMPLETED | OUTPATIENT
Start: 2023-10-10 | End: 2023-10-10

## 2023-10-10 RX ADMIN — LIDOCAINE HYDROCHLORIDE AND EPINEPHRINE 20 ML: 10; 10 INJECTION, SOLUTION INFILTRATION; PERINEURAL at 22:43

## 2023-10-10 ASSESSMENT — PAIN DESCRIPTION - LOCATION: LOCATION: OTHER (COMMENT)

## 2023-10-10 ASSESSMENT — PAIN DESCRIPTION - DESCRIPTORS: DESCRIPTORS: SHARP;SORE

## 2023-10-10 ASSESSMENT — LIFESTYLE VARIABLES
HOW MANY STANDARD DRINKS CONTAINING ALCOHOL DO YOU HAVE ON A TYPICAL DAY: PATIENT DOES NOT DRINK
HOW OFTEN DO YOU HAVE A DRINK CONTAINING ALCOHOL: NEVER
HOW MANY STANDARD DRINKS CONTAINING ALCOHOL DO YOU HAVE ON A TYPICAL DAY: PATIENT DOES NOT DRINK
HOW OFTEN DO YOU HAVE A DRINK CONTAINING ALCOHOL: NEVER

## 2023-10-10 ASSESSMENT — PAIN - FUNCTIONAL ASSESSMENT: PAIN_FUNCTIONAL_ASSESSMENT: 0-10

## 2023-10-10 ASSESSMENT — PAIN SCALES - GENERAL: PAINLEVEL_OUTOF10: 8

## 2023-10-10 ASSESSMENT — PAIN DESCRIPTION - ORIENTATION: ORIENTATION: RIGHT

## 2023-10-10 ASSESSMENT — PAIN DESCRIPTION - PAIN TYPE: TYPE: ACUTE PAIN

## 2023-10-11 NOTE — ED NOTES
Patient (s)  given copy of dc instructions and 2 script(s). Patient (s)  verbalized understanding of instructions and script (s). Patient given a current medication reconciliation form and verbalized understanding of their medications. Patient (s) verbalized understanding of the importance of discussing medications with  his or her physician or clinic they will be following up with. Patient alert and oriented and in no acute distress. Patient discharged home ambulatory with self.        Kenya Freire RN  10/10/23 6433

## 2023-10-11 NOTE — ED TRIAGE NOTES
Abscess to r axilla x 1 week. Notes Abscess came about 2 weeks ago but went away and has returned. No drainage noted at this time. No meds PTA    Pt also notes nasal pressure and congestion due to allergies x 2 months. Notes taking several OTC allergy meds including Claritin daily plus singulare from PCP w/ no relief.

## 2023-10-11 NOTE — ED PROVIDER NOTES
testing, however symptoms appear to related to chronic allegic rhinitis given that they have been present for 2 months. She has an appointment with ENT in 3 weeks, encouraged to continue current prescribed regimen and follow-up with ENT as scheduled. For axillary abscess, incision and drainage was performed. There is a scant to moderate amount of purulent drainage expressed. Patient tolerated procedure well. Will discharge with Keflex and Bactrim. We discussed continue warm compress. He was given return precautions, advised to return to the ED for deterioration. Patient verbalized understanding of his current plan. FINAL IMPRESSION     1. Abscess of axilla, right    2. Nasal congestion          DISPOSITION/PLAN   DISPOSITION Decision To Discharge 10/10/2023 10:39:43 PM    Discharge Note: The patient is stable for discharge home. The signs, symptoms, diagnosis, and discharge instructions have been discussed, understanding conveyed, and agreed upon. The patient is to follow up as recommended or return to ER should their symptoms worsen. PATIENT REFERRED TO:  [facility]  [address] [place] [address]  [phone]  Schedule an appointment as soon as possible for a visit       [address] [place] [address] EMERGENCY DEPT  [address] [place] [address]  [phone]    As needed, If symptoms worsen       DISCHARGE MEDICATIONS:     Medication List        START taking these medications      cephALEXin 500 MG capsule  Commonly known as: KEFLEX  Take 1 capsule by mouth 4 times daily for 7 days     sulfamethoxazole-trimethoprim 800-160 MG per tablet  Commonly known as:  Bactrim DS  Take 1 tablet by mouth 2 times daily for 10 days            ASK your doctor about these medications      acetaminophen 650 MG extended release tablet  Commonly known as: TYLENOL     * albuterol 1.25 MG/3ML nebulizer solution  Commonly known as: ACCUNEB     * albuterol sulfate  (90 Base) MCG/ACT

## 2023-11-07 ENCOUNTER — HOSPITAL ENCOUNTER (EMERGENCY)
Facility: HOSPITAL | Age: 38
Discharge: HOME | End: 2023-11-07
Payer: MEDICAID

## 2023-11-07 VITALS
DIASTOLIC BLOOD PRESSURE: 99 MMHG | SYSTOLIC BLOOD PRESSURE: 145 MMHG | WEIGHT: 195 LBS | BODY MASS INDEX: 30.61 KG/M2 | TEMPERATURE: 97.5 F | HEIGHT: 67 IN | OXYGEN SATURATION: 97 % | HEART RATE: 98 BPM | RESPIRATION RATE: 18 BRPM

## 2023-11-07 DIAGNOSIS — N89.8 VAGINAL ITCHING: ICD-10-CM

## 2023-11-07 DIAGNOSIS — M54.50 ACUTE BILATERAL LOW BACK PAIN WITHOUT SCIATICA: Primary | ICD-10-CM

## 2023-11-07 LAB
APPEARANCE UR: CLEAR
BACTERIA URNS QL MICRO: NEGATIVE /HPF
BILIRUB UR QL: NEGATIVE
C TRACH DNA SPEC QL NAA+PROBE: NEGATIVE
CLUE CELLS VAG QL WET PREP: NORMAL
COLOR UR: NORMAL
EPITH CASTS URNS QL MICRO: NORMAL /LPF
GLUCOSE UR STRIP.AUTO-MCNC: NEGATIVE MG/DL
HCG UR QL: NEGATIVE
HGB UR QL STRIP: NEGATIVE
KETONES UR QL STRIP.AUTO: NEGATIVE MG/DL
KOH PREP SPEC: NORMAL
LEUKOCYTE ESTERASE UR QL STRIP.AUTO: NEGATIVE
N GONORRHOEA DNA SPEC QL NAA+PROBE: NEGATIVE
NITRITE UR QL STRIP.AUTO: NEGATIVE
PH UR STRIP: 7.5 (ref 5–8)
PROT UR STRIP-MCNC: NEGATIVE MG/DL
RBC #/AREA URNS HPF: NORMAL /HPF (ref 0–5)
SAMPLE TYPE: NORMAL
SERVICE CMNT-IMP: NORMAL
SERVICE CMNT-IMP: NORMAL
SP GR UR REFRACTOMETRY: 1.01
SPECIMEN SOURCE: NORMAL
T VAGINALIS VAG QL WET PREP: NORMAL
URINE CULTURE IF INDICATED: NORMAL
UROBILINOGEN UR QL STRIP.AUTO: 1 EU/DL (ref 0.2–1)
WBC URNS QL MICRO: NORMAL /HPF (ref 0–4)

## 2023-11-07 PROCEDURE — 87591 N.GONORRHOEAE DNA AMP PROB: CPT

## 2023-11-07 PROCEDURE — 87491 CHLMYD TRACH DNA AMP PROBE: CPT

## 2023-11-07 PROCEDURE — 81001 URINALYSIS AUTO W/SCOPE: CPT

## 2023-11-07 PROCEDURE — 81025 URINE PREGNANCY TEST: CPT

## 2023-11-07 PROCEDURE — 6370000000 HC RX 637 (ALT 250 FOR IP): Performed by: PHYSICIAN ASSISTANT

## 2023-11-07 PROCEDURE — 87210 SMEAR WET MOUNT SALINE/INK: CPT

## 2023-11-07 PROCEDURE — 99283 EMERGENCY DEPT VISIT LOW MDM: CPT

## 2023-11-07 RX ORDER — FLUCONAZOLE 150 MG/1
150 TABLET ORAL
Status: COMPLETED | OUTPATIENT
Start: 2023-11-07 | End: 2023-11-07

## 2023-11-07 RX ORDER — CYCLOBENZAPRINE HCL 10 MG
TABLET ORAL
Qty: 15 TABLET | Refills: 0 | Status: SHIPPED | OUTPATIENT
Start: 2023-11-07

## 2023-11-07 RX ORDER — LIDOCAINE 4 G/G
PATCH TOPICAL
Qty: 10 PATCH | Refills: 0 | Status: SHIPPED | OUTPATIENT
Start: 2023-11-07

## 2023-11-07 RX ORDER — DICLOFENAC SODIUM 75 MG/1
75 TABLET, DELAYED RELEASE ORAL 2 TIMES DAILY PRN
Qty: 20 TABLET | Refills: 0 | Status: SHIPPED | OUTPATIENT
Start: 2023-11-07

## 2023-11-07 RX ADMIN — FLUCONAZOLE 150 MG: 150 TABLET ORAL at 12:08

## 2023-11-07 ASSESSMENT — PAIN DESCRIPTION - ORIENTATION: ORIENTATION: LOWER

## 2023-11-07 ASSESSMENT — PAIN - FUNCTIONAL ASSESSMENT: PAIN_FUNCTIONAL_ASSESSMENT: 0-10

## 2023-11-07 ASSESSMENT — PAIN SCALES - GENERAL
PAINLEVEL_OUTOF10: 10
PAINLEVEL_OUTOF10: 10

## 2023-11-07 ASSESSMENT — PAIN DESCRIPTION - LOCATION: LOCATION: BACK

## 2023-11-07 ASSESSMENT — PAIN DESCRIPTION - DESCRIPTORS: DESCRIPTORS: THROBBING

## 2023-11-07 NOTE — ED NOTES
Patient (s)  given copy of dc instructions and  paper script(s) and 4 electronic scripts. Patient (s)  verbalized understanding of instructions and script (s). Patient given a current medication reconciliation form and verbalized understanding of their medications. Patient (s) verbalized understanding of the importance of discussing medications with  his or her physician or clinic they will be following up with. Patient alert and oriented and in no acute distress. Patient offered wheelchair from treatment area to hospital entrance, patient declined wheelchair.        Bhavna Lorenzo RN  11/07/23 7351

## 2023-11-07 NOTE — ED PROVIDER NOTES
MCG/ACT inhaler Comments:   Reason for Stopping:         guaiFENesin (MUCINEX) 600 MG extended release tablet Comments:   Reason for Stopping:               I have seen and evaluated the patient autonomously. My supervision physician was on site and available for consultation if needed. I am the Primary Clinician of Record. Deanna Bahena PA-C (electronically signed)    (Please note that parts of this dictation were completed with voice recognition software. Quite often unanticipated grammatical, syntax, homophones, and other interpretive errors are inadvertently transcribed by the computer software. Please disregards these errors.  Please excuse any errors that have escaped final proofreading.)     Deanna Bahena PA-C  11/07/23 1200

## 2023-11-09 ENCOUNTER — TELEPHONE (OUTPATIENT)
Age: 38
End: 2023-11-09

## 2023-11-09 NOTE — TELEPHONE ENCOUNTER
Attempted to reach the patient on her listed mobile number however her daughter answered stating that the patient was not available. The daughter took a message and will have the patient call back. Patient was being called about her recent ED visit and to be provided with the contact number for [facility] as she needs to be seen by a back specialists.

## 2024-01-05 ENCOUNTER — HOSPITAL ENCOUNTER (EMERGENCY)
Facility: HOSPITAL | Age: 39
Discharge: HOME | End: 2024-01-05
Attending: EMERGENCY MEDICINE
Payer: MEDICAID

## 2024-01-05 VITALS
BODY MASS INDEX: 29.66 KG/M2 | WEIGHT: 189 LBS | HEIGHT: 67 IN | TEMPERATURE: 98.3 F | DIASTOLIC BLOOD PRESSURE: 93 MMHG | RESPIRATION RATE: 17 BRPM | OXYGEN SATURATION: 98 % | HEART RATE: 69 BPM | SYSTOLIC BLOOD PRESSURE: 156 MMHG

## 2024-01-05 DIAGNOSIS — J01.90 ACUTE SINUSITIS, RECURRENCE NOT SPECIFIED, UNSPECIFIED LOCATION: ICD-10-CM

## 2024-01-05 DIAGNOSIS — G44.209 ACUTE NON INTRACTABLE TENSION-TYPE HEADACHE: ICD-10-CM

## 2024-01-05 DIAGNOSIS — J06.9 ACUTE UPPER RESPIRATORY INFECTION: Primary | ICD-10-CM

## 2024-01-05 DIAGNOSIS — R03.0 ELEVATED BLOOD PRESSURE READING WITHOUT DIAGNOSIS OF HYPERTENSION: ICD-10-CM

## 2024-01-05 LAB
FLUAV RNA SPEC QL NAA+PROBE: NOT DETECTED
FLUBV RNA SPEC QL NAA+PROBE: NOT DETECTED
SARS-COV-2 RNA RESP QL NAA+PROBE: NOT DETECTED

## 2024-01-05 PROCEDURE — 6370000000 HC RX 637 (ALT 250 FOR IP): Performed by: EMERGENCY MEDICINE

## 2024-01-05 PROCEDURE — 87636 SARSCOV2 & INF A&B AMP PRB: CPT

## 2024-01-05 PROCEDURE — 99283 EMERGENCY DEPT VISIT LOW MDM: CPT

## 2024-01-05 RX ORDER — PREDNISONE 20 MG/1
60 TABLET ORAL
Status: COMPLETED | OUTPATIENT
Start: 2024-01-05 | End: 2024-01-05

## 2024-01-05 RX ORDER — OXYMETAZOLINE HYDROCHLORIDE 0.05 G/100ML
2 SPRAY NASAL ONCE
Status: COMPLETED | OUTPATIENT
Start: 2024-01-05 | End: 2024-01-05

## 2024-01-05 RX ORDER — BENZONATATE 200 MG/1
200 CAPSULE ORAL 3 TIMES DAILY PRN
Qty: 15 CAPSULE | Refills: 0 | Status: SHIPPED | OUTPATIENT
Start: 2024-01-05 | End: 2024-01-12

## 2024-01-05 RX ORDER — BUTALBITAL, ACETAMINOPHEN AND CAFFEINE 50; 325; 40 MG/1; MG/1; MG/1
1 TABLET ORAL
Status: COMPLETED | OUTPATIENT
Start: 2024-01-05 | End: 2024-01-05

## 2024-01-05 RX ORDER — AZITHROMYCIN 250 MG/1
250 TABLET, FILM COATED ORAL SEE ADMIN INSTRUCTIONS
Qty: 6 TABLET | Refills: 0 | Status: SHIPPED | OUTPATIENT
Start: 2024-01-05 | End: 2024-01-10

## 2024-01-05 RX ORDER — BUTALBITAL, ACETAMINOPHEN AND CAFFEINE 50; 325; 40 MG/1; MG/1; MG/1
1 TABLET ORAL EVERY 6 HOURS PRN
Qty: 20 TABLET | Refills: 0 | Status: SHIPPED | OUTPATIENT
Start: 2024-01-05

## 2024-01-05 RX ORDER — PREDNISONE 50 MG/1
50 TABLET ORAL DAILY
Qty: 5 TABLET | Refills: 0 | Status: SHIPPED | OUTPATIENT
Start: 2024-01-05 | End: 2024-01-10

## 2024-01-05 RX ADMIN — PREDNISONE 60 MG: 20 TABLET ORAL at 02:14

## 2024-01-05 RX ADMIN — OXYMETAZOLINE HYDROCHLORIDE 2 SPRAY: 0.05 SPRAY, METERED NASAL at 02:14

## 2024-01-05 RX ADMIN — BUTALBITAL, ACETAMINOPHEN AND CAFFEINE 1 TABLET: 325; 50; 40 TABLET ORAL at 02:14

## 2024-01-05 ASSESSMENT — PAIN - FUNCTIONAL ASSESSMENT
PAIN_FUNCTIONAL_ASSESSMENT: 0-10
PAIN_FUNCTIONAL_ASSESSMENT: NONE - DENIES PAIN

## 2024-01-05 ASSESSMENT — PAIN DESCRIPTION - PAIN TYPE: TYPE: ACUTE PAIN

## 2024-01-05 ASSESSMENT — LIFESTYLE VARIABLES
HOW OFTEN DO YOU HAVE A DRINK CONTAINING ALCOHOL: MONTHLY OR LESS
HOW MANY STANDARD DRINKS CONTAINING ALCOHOL DO YOU HAVE ON A TYPICAL DAY: 1 OR 2

## 2024-01-05 ASSESSMENT — ENCOUNTER SYMPTOMS
COUGH: 1
SORE THROAT: 0
EYE PAIN: 0
NAUSEA: 0
ABDOMINAL PAIN: 0
RHINORRHEA: 0
DIARRHEA: 0
VOMITING: 0
SHORTNESS OF BREATH: 0

## 2024-01-05 ASSESSMENT — PAIN SCALES - GENERAL
PAINLEVEL_OUTOF10: 6
PAINLEVEL_OUTOF10: 0

## 2024-01-05 ASSESSMENT — PAIN DESCRIPTION - LOCATION: LOCATION: HEAD

## 2024-01-05 ASSESSMENT — PAIN DESCRIPTION - DESCRIPTORS: DESCRIPTORS: ACHING

## 2024-01-05 NOTE — DISCHARGE INSTRUCTIONS
you by the pharmacy. If you have any questions or reservations about taking the medication due to side effects or interactions with other medications, please call your primary care physician or contact the ER to speak with the charge nurse.     Please make an appointment with your family doctor or the physician you were referred to for follow-up of this visit as instructed on your discharge paperwork. Return to the ER if you are unable to be seen or if you are unable to be seen in a timely manner.    If you have any problem arranging the follow-up visit, contact the Emergency Department immediately.

## 2024-01-05 NOTE — ED PROVIDER NOTES
EMERGENCY DEPARTMENT HISTORY AND PHYSICAL EXAM            Please note that this dictation was completed with the assistance of \"Dragon\", the computer voice recognition software. Quite often unanticipated grammatical, syntax, homophones, and other interpretive errors are inadvertently transcribed by the computer software. Please disregard these errors and any errors that have escaped final proofreading. Thank you.    Date of Evaluation: 01/05/24  Patient: Mary Ellen Mohr  Patient Age and Sex: 38 y.o. female   MRN: [id number]  CSN: [id number]  PCP: No primary care provider on file.    History of Present Illness     Chief Complaint   Patient presents with    URI     History Provided By: Patient/family/EMS (if available)    History is limited by: Nothing     HPI: Mary Ellen Mohr, 38 y.o. female with past medical history as documented below presents to the ED with c/o of 1 week history of mild to moderate bitemporal headache, nasal congestion, dry cough and intermittent low-grade fevers.  Patient has taken over-the-counter medications including her home inhaler with minimal relief of symptoms.  Patient denies any chest pain or shortness of breath.  No recent sick contacts or illnesses but denies any chance of pregnancy.. Pt denies any other exacerbating or ameliorating factors. There are no other complaints, changes or physical findings pertinent to the HPI at this time.    Nursing notes were all reviewed and agreed with or any disagreements were addressed in the HPI.    Past History   Past Medical History:  Past Medical History:   Diagnosis Date    Anger     Asthma     Delivery normal     Depression     Ectopic pregnancy     Hypertension     not taking medications    Migraines     Miscarriage        Past Surgical History:  Past Surgical History:   Procedure Laterality Date    CHOLECYSTECTOMY      HERNIA REPAIR Right 12/05/2014    Open repair right inguinal hernia with mesh    HX OPEN REDUCTION INTERNAL FIXATION Right

## 2024-01-05 NOTE — ED NOTES
Pt discharged home. Home care and follow-up instructions given to pt. Pt verbalized understanding. No IV. VS WNL. No distress observed. 4 new prescriptions sent to pt's Pharmacy. Pt ambulated self to exit.

## 2024-01-05 NOTE — ED TRIAGE NOTES
Pt reports headaches, nasal congestion, dry cough,  intermittent fevers, r ear pain x 6 days. Pt reports taking OTC decongestants, cough suppressants and nasal decongestants wth little Improvement of symptoms. Resp even and unlabored. LCTA. VSS. NAD

## 2024-01-05 NOTE — ED NOTES
Discharge instructions were given to the patient by zoila.     The patient left the Emergency Department ambulatory, alert and oriented and in no acute distress with 4 prescriptions. The patient was encouraged to call or return to the ED for worsening issues or problems and was encouraged to schedule a follow up appointment for continuing care.     The patient verbalized understanding of discharge instructions and prescriptions, all questions were answered. The patient has no further concerns at this time.

## 2024-01-11 VITALS
DIASTOLIC BLOOD PRESSURE: 101 MMHG | BODY MASS INDEX: 28.83 KG/M2 | TEMPERATURE: 98.6 F | HEART RATE: 95 BPM | OXYGEN SATURATION: 98 % | RESPIRATION RATE: 16 BRPM | SYSTOLIC BLOOD PRESSURE: 155 MMHG | HEIGHT: 68 IN | WEIGHT: 190.26 LBS

## 2024-01-11 ASSESSMENT — PAIN - FUNCTIONAL ASSESSMENT: PAIN_FUNCTIONAL_ASSESSMENT: 0-10

## 2024-01-11 ASSESSMENT — PAIN SCALES - GENERAL: PAINLEVEL_OUTOF10: 10

## 2024-01-11 ASSESSMENT — PAIN DESCRIPTION - DESCRIPTORS: DESCRIPTORS: ACHING

## 2024-01-11 ASSESSMENT — PAIN DESCRIPTION - LOCATION: LOCATION: HEAD

## 2024-01-12 ENCOUNTER — HOSPITAL ENCOUNTER (EMERGENCY)
Facility: HOSPITAL | Age: 39
Discharge: LEFT BEFORE DISCHARGE | End: 2024-01-12

## 2024-01-18 ENCOUNTER — HOSPITAL ENCOUNTER (EMERGENCY)
Facility: HOSPITAL | Age: 39
Discharge: HOME | End: 2024-01-18
Payer: MEDICAID

## 2024-01-18 VITALS
SYSTOLIC BLOOD PRESSURE: 146 MMHG | DIASTOLIC BLOOD PRESSURE: 79 MMHG | WEIGHT: 196 LBS | HEART RATE: 97 BPM | RESPIRATION RATE: 18 BRPM | BODY MASS INDEX: 29.7 KG/M2 | TEMPERATURE: 98.1 F | HEIGHT: 68 IN | OXYGEN SATURATION: 99 %

## 2024-01-18 DIAGNOSIS — J01.20 ACUTE NON-RECURRENT ETHMOIDAL SINUSITIS: Primary | ICD-10-CM

## 2024-01-18 PROCEDURE — 6370000000 HC RX 637 (ALT 250 FOR IP): Performed by: PHYSICIAN ASSISTANT

## 2024-01-18 PROCEDURE — 99283 EMERGENCY DEPT VISIT LOW MDM: CPT

## 2024-01-18 RX ORDER — AMOXICILLIN AND CLAVULANATE POTASSIUM 875; 125 MG/1; MG/1
1 TABLET, FILM COATED ORAL 2 TIMES DAILY
Qty: 14 TABLET | Refills: 0 | Status: SHIPPED | OUTPATIENT
Start: 2024-01-18 | End: 2024-01-25

## 2024-01-18 RX ORDER — FLUTICASONE PROPIONATE 50 MCG
1 SPRAY, SUSPENSION (ML) NASAL DAILY
Qty: 16 G | Refills: 0 | Status: SHIPPED | OUTPATIENT
Start: 2024-01-18

## 2024-01-18 RX ORDER — LORATADINE 10 MG/1
10 TABLET ORAL DAILY
Qty: 30 TABLET | Refills: 0 | Status: SHIPPED | OUTPATIENT
Start: 2024-01-18

## 2024-01-18 RX ORDER — ACETAMINOPHEN 500 MG
1000 TABLET ORAL
Status: COMPLETED | OUTPATIENT
Start: 2024-01-18 | End: 2024-01-18

## 2024-01-18 RX ADMIN — ACETAMINOPHEN 1000 MG: 500 TABLET ORAL at 11:48

## 2024-01-18 ASSESSMENT — ENCOUNTER SYMPTOMS
CHEST TIGHTNESS: 0
RHINORRHEA: 1
VOICE CHANGE: 0
COUGH: 1
TROUBLE SWALLOWING: 0
NAUSEA: 0
SHORTNESS OF BREATH: 0
EYE PAIN: 0
SINUS PRESSURE: 1
SORE THROAT: 0
WHEEZING: 0
VOMITING: 0
DIARRHEA: 0
BACK PAIN: 0
FACIAL SWELLING: 0
ABDOMINAL PAIN: 0
SINUS PAIN: 1

## 2024-01-18 ASSESSMENT — PAIN SCALES - GENERAL: PAINLEVEL_OUTOF10: 8

## 2024-01-18 ASSESSMENT — PAIN DESCRIPTION - DESCRIPTORS: DESCRIPTORS: ACHING

## 2024-01-18 ASSESSMENT — PAIN DESCRIPTION - ORIENTATION: ORIENTATION: RIGHT;LEFT

## 2024-01-18 ASSESSMENT — PAIN - FUNCTIONAL ASSESSMENT: PAIN_FUNCTIONAL_ASSESSMENT: 0-10

## 2024-01-18 ASSESSMENT — PAIN DESCRIPTION - LOCATION: LOCATION: HEAD

## 2024-01-18 NOTE — ED NOTES

## 2024-01-18 NOTE — ED PROVIDER NOTES
2016     Years since quittin.8    Smokeless tobacco: Never   Substance Use Topics    Alcohol use: Not Currently    Drug use: No       Allergies:  Allergies   Allergen Reactions    Ibuprofen Hives and Itching       CURRENT MEDICATIONS      Previous Medications    ACETAMINOPHEN (TYLENOL) 650 MG EXTENDED RELEASE TABLET    Take 1 tablet by mouth in the morning and 1 tablet at noon and 1 tablet in the evening.    BUDESONIDE-FORMOTEROL (SYMBICORT) 80-4.5 MCG/ACT AERO    TAKE 1 PUFF BY MOUTH TWICE A DAY    BUTALBITAL-ACETAMINOPHEN-CAFFEINE (FIORICET, ESGIC) -40 MG PER TABLET    Take 1 tablet by mouth every 6 hours as needed for Headaches    CETIRIZINE-PSUEDOEPHEDRINE (ZYRTEC-D) 5-120 MG PER EXTENDED RELEASE TABLET    Take 1 tablet by mouth 2 times daily    CYCLOBENZAPRINE (FLEXERIL) 10 MG TABLET    TAKE 1 TABLET BY MOUTH THREE TIMES A DAY AS NEEDED    DICLOFENAC (VOLTAREN) 75 MG EC TABLET    Take 1 tablet by mouth 2 times daily as needed for Pain    LIDOCAINE 4 % EXTERNAL PATCH    Apply one patch every 12 hours as needed for pain    MICONAZOLE (MICOTIN) 2 % CREAM    Apply topically 2 times daily as needed (vainal itching) Apply topically 2 times daily.    NAPROXEN (NAPROSYN) 250 MG TABLET    Take 1 tablet by mouth 2 times daily for 7 days    TRAMADOL (ULTRAM) 50 MG TABLET    TAKE 1 TABLET BY MOUTH EVERY 8 HOURS AS NEEDED       SCREENINGS               No data recorded         PHYSICAL EXAM      Vitals:    24 1130 24 1145   BP: (!) 146/79    Pulse: (!) 101 97   Resp: 18    Temp: 98.1 °F (36.7 °C)    TempSrc: Oral    SpO2: 100% 99%   Weight: 88.9 kg (196 lb)    Height: 1.727 m (5' 8\")         Physical Exam  Vitals and nursing note reviewed.   Constitutional:       General: She is not in acute distress.     Appearance: Normal appearance. She is not ill-appearing, toxic-appearing or diaphoretic.   HENT:      Head: Normocephalic and atraumatic.      Jaw: There is normal jaw occlusion.      Right Ear:

## 2024-01-18 NOTE — ED NOTES
Pt presents ambulatory to ED complaining of nasal congestion, sinus pressure and pain x2 days. Pt also reports nasal burning, sneezing, runny nose, cough and bilat ear fullness. Pt reports she was seen here and dx with URI on 1/05/2024, she was sent home with cough medications, antibiotics, and prednisone. Pt reports she took all meds as prescribed. Pt felt better but symptoms returned 2 days PTA.    Patient is resting on stretcher. Call bell at bedside. Patient is A&Ox4, follows commands. Patient RR is regular rate and depth. Pt cough is dry. Pt throat is pink, intact. Pt voice appears nasally. Bilat ears are patent, TM visible.     Patient is well appearing and does not appear to be in acute distress at this time. Patient and family updated on care plan. This RN will continue to monitor.     Emergency Department Nursing Plan of Care The Nursing Plan of Care is developed from the Nursing assessment and Emergency Department Attending provider initial evaluation. The plan of care may be reviewed in the “ED Provider note”.     The Plan of Care was developed with the following considerations:  Patient / Family readiness to learn indicated by:verbalized understanding  Persons(s) to be included in education: patient  Barriers to Learning/Limitations:None    Signed    Noah Whitehead RN   1/18/2024   11:54 AM

## 2024-05-14 NOTE — ED NOTES
Bedside and Verbal shift change report given to Maria Teresa Parks RN   (oncoming nurse) by Jammie cuellar. Report included the following information SBAR, Kardex, ED Summary, Procedure Summary, Intake/Output, MAR, Recent Results and Med Rec Status. No

## 2024-07-10 ENCOUNTER — HOSPITAL ENCOUNTER (OUTPATIENT)
Facility: HOSPITAL | Age: 39
Discharge: HOME | End: 2024-07-13
Payer: MEDICAID

## 2024-07-10 DIAGNOSIS — M47.816 LUMBAR SPONDYLOSIS: ICD-10-CM

## 2024-07-10 DIAGNOSIS — M25.552 LEFT HIP PAIN: ICD-10-CM

## 2024-07-10 DIAGNOSIS — M54.32 LEFT SIDED SCIATICA: ICD-10-CM

## 2024-07-10 DIAGNOSIS — M79.18 MYOFASCIAL PAIN SYNDROME OF LUMBAR SPINE: ICD-10-CM

## 2024-07-10 DIAGNOSIS — M51.36 DISCOGENIC LOW BACK PAIN: ICD-10-CM

## 2024-07-10 PROCEDURE — 72148 MRI LUMBAR SPINE W/O DYE: CPT

## 2024-10-10 ENCOUNTER — ANESTHESIA EVENT (OUTPATIENT)
Facility: HOSPITAL | Age: 39
End: 2024-10-10
Payer: MEDICAID

## 2024-10-10 ENCOUNTER — HOSPITAL ENCOUNTER (OUTPATIENT)
Facility: HOSPITAL | Age: 39
Setting detail: OUTPATIENT SURGERY
Discharge: HOME | End: 2024-10-10
Attending: INTERNAL MEDICINE | Admitting: INTERNAL MEDICINE
Payer: MEDICAID

## 2024-10-10 ENCOUNTER — ANESTHESIA (OUTPATIENT)
Facility: HOSPITAL | Age: 39
End: 2024-10-10
Payer: MEDICAID

## 2024-10-10 VITALS
RESPIRATION RATE: 18 BRPM | OXYGEN SATURATION: 100 % | HEART RATE: 75 BPM | DIASTOLIC BLOOD PRESSURE: 84 MMHG | HEIGHT: 68 IN | TEMPERATURE: 97.5 F | SYSTOLIC BLOOD PRESSURE: 134 MMHG | BODY MASS INDEX: 29.95 KG/M2 | WEIGHT: 197.6 LBS

## 2024-10-10 PROCEDURE — 3600007512: Performed by: INTERNAL MEDICINE

## 2024-10-10 PROCEDURE — 2709999900 HC NON-CHARGEABLE SUPPLY: Performed by: INTERNAL MEDICINE

## 2024-10-10 PROCEDURE — 3700000000 HC ANESTHESIA ATTENDED CARE: Performed by: INTERNAL MEDICINE

## 2024-10-10 PROCEDURE — 6360000002 HC RX W HCPCS: Performed by: NURSE ANESTHETIST, CERTIFIED REGISTERED

## 2024-10-10 PROCEDURE — 3600007502: Performed by: INTERNAL MEDICINE

## 2024-10-10 PROCEDURE — 2580000003 HC RX 258: Performed by: NURSE ANESTHETIST, CERTIFIED REGISTERED

## 2024-10-10 PROCEDURE — 2500000003 HC RX 250 WO HCPCS: Performed by: NURSE ANESTHETIST, CERTIFIED REGISTERED

## 2024-10-10 PROCEDURE — 7100000010 HC PHASE II RECOVERY - FIRST 15 MIN: Performed by: INTERNAL MEDICINE

## 2024-10-10 PROCEDURE — 7100000011 HC PHASE II RECOVERY - ADDTL 15 MIN: Performed by: INTERNAL MEDICINE

## 2024-10-10 PROCEDURE — 3700000001 HC ADD 15 MINUTES (ANESTHESIA): Performed by: INTERNAL MEDICINE

## 2024-10-10 PROCEDURE — 88305 TISSUE EXAM BY PATHOLOGIST: CPT

## 2024-10-10 RX ORDER — SODIUM CHLORIDE 9 MG/ML
25 INJECTION, SOLUTION INTRAVENOUS PRN
Status: DISCONTINUED | OUTPATIENT
Start: 2024-10-10 | End: 2024-10-10 | Stop reason: HOSPADM

## 2024-10-10 RX ORDER — SODIUM CHLORIDE 0.9 % (FLUSH) 0.9 %
5-40 SYRINGE (ML) INJECTION PRN
Status: DISCONTINUED | OUTPATIENT
Start: 2024-10-10 | End: 2024-10-10 | Stop reason: HOSPADM

## 2024-10-10 RX ORDER — 0.9 % SODIUM CHLORIDE 0.9 %
INTRAVENOUS SOLUTION INTRAVENOUS
Status: DISCONTINUED | OUTPATIENT
Start: 2024-10-10 | End: 2024-10-10 | Stop reason: SDUPTHER

## 2024-10-10 RX ORDER — SODIUM CHLORIDE 0.9 % (FLUSH) 0.9 %
5-40 SYRINGE (ML) INJECTION EVERY 12 HOURS SCHEDULED
Status: DISCONTINUED | OUTPATIENT
Start: 2024-10-10 | End: 2024-10-10 | Stop reason: HOSPADM

## 2024-10-10 RX ADMIN — PROPOFOL 150 MG: 10 INJECTION, EMULSION INTRAVENOUS at 09:10

## 2024-10-10 RX ADMIN — PROPOFOL 50 MG: 10 INJECTION, EMULSION INTRAVENOUS at 09:28

## 2024-10-10 RX ADMIN — LIDOCAINE HYDROCHLORIDE 100 MG: 20 INJECTION, SOLUTION EPIDURAL; INFILTRATION; INTRACAUDAL; PERINEURAL at 09:05

## 2024-10-10 RX ADMIN — PROPOFOL 150 MG: 10 INJECTION, EMULSION INTRAVENOUS at 09:13

## 2024-10-10 RX ADMIN — SODIUM CHLORIDE 700 ML: 9 INJECTION, SOLUTION INTRAVENOUS at 09:35

## 2024-10-10 RX ADMIN — PROPOFOL 150 MG: 10 INJECTION, EMULSION INTRAVENOUS at 09:18

## 2024-10-10 RX ADMIN — PROPOFOL 150 MG: 10 INJECTION, EMULSION INTRAVENOUS at 09:05

## 2024-10-10 ASSESSMENT — PAIN - FUNCTIONAL ASSESSMENT: PAIN_FUNCTIONAL_ASSESSMENT: 0-10

## 2024-10-10 NOTE — OP NOTE
NAME:  Mary Ellen Mohr   :   1985   MRN:   [id number]     Date/Time:  10/10/2024 9:46 AM    Colonoscopy Operative Report    Procedure Type:   Colonoscopy with biopsy, polypectomy (cold snare)     Indications:     Rectal bleeding  Pre-operative Diagnosis: see indication above  Post-operative Diagnosis:  See findings below  :  Scooter Angel MD  Referring Provider: -No primary care provider on file.    Exam:  Airway: clear, no airway problems anticipated  Heart: RRR, without gallops or rubs  Lungs: clear bilaterally without wheezes, crackles, or rhonchi  Abdomen: soft, nontender, nondistended, bowel sounds present  Mental Status: awake, alert and oriented to person, place and time    Sedation:  MAC anesthesia Propofol 650mg IV  Procedure Details:  After informed consent was obtained with all risks and benefits of procedure explained and preoperative exam completed, the patient was taken to the endoscopy suite and placed in the left lateral decubitus position.  Upon sequential sedation as per above, a digital rectal exam was performed demonstrating internal hemorrhoids.  The Olympus videocolonoscope  was inserted in the rectum and carefully advanced to the cecum, which was identified by the ileocecal valve and appendiceal orifice.  The distal terminal ileum was evaluated.  The quality of preparation was adequate.  The colonoscope was slowly withdrawn with careful evaluation between folds. Retroflexion in the rectum was completed demonstrating internal hemorrhoids.     Findings:     -Normal ileum; biopsied to exclude inflammation  -Scattered colonic diverticulosis  -Single 4mm sessile polyp in the rectum at 18cm; removed with cold snare  -Small grade 1 internal hemorrhoids  -Random colon biopsies obtained to exclude microscopic colitis    Specimen Removed:  #3 rectal polyp; #4 terminal ileum; #5 random colon  Complications: None.   EBL:  None.    Impression:    -Normal ileum; biopsied to exclude
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
## 2024-10-10 NOTE — DISCHARGE INSTRUCTIONS
Mary Ellen Mohr  [id number]  1985    EGD/COLON DISCHARGE INSTRUCTIONS  Discomfort:  Redness at IV site- apply warm compress to area; if redness or soreness persist- contact your physician  There may be a slight amount of blood passed from the rectum  Gaseous discomfort- walking, belching will help relieve any discomfort  You may not operate a vehicle for 12 hours  You may not engage in an occupation involving machinery or appliances for rest of today  You may not drink alcoholic beverages for at least 12 hours  Avoid making any critical decisions for at least 24 hour  DIET:   High fiber diet.   - however -  remember your colon is empty and a heavy meal will produce gas.   Avoid these foods:  vegetables, fried / greasy foods, carbonated drinks for today  MEDICATION:  [unfilled]     ACTIVITY:  You may not resume your normal daily activities until tomorrow AM; it is recommended that you spend the remainder of the day resting -  avoid any strenuous activity.  CALL M.D.  ANY SIGN OF:   Increasing pain, nausea, vomiting  Abdominal distension (swelling)  New increased bleeding (oral or rectal)  Fever (chills)  Pain in chest area  Bloody discharge from nose or mouth  Shortness of breath    IMPRESSION:  -Normal esophagus; biopsied to exclude inflammation  -Normal stomach; biopsied to exclude inflammation  -Normal duodenum  -Normal ileum; biopsied to exclude inflammation  -Scattered colonic diverticulosis  -Single 4mm sessile polyp in the rectum at 18cm; removed with cold snare  -Small grade 1 internal hemorrhoids  -Random colon biopsies obtained to exclude microscopic colitis    Follow-up Instructions:   Call Dr. Angel for the results of procedure / biopsy in 7-10 days  Telephone # [phone]  Repeat colonoscopy in 5 years    Scooter Angel MD      Patient Education on Sedation / Analgesia Administered for Procedure      For 24 hours after general anesthesia or intravenous analgesia / sedation:  Have someone

## 2024-10-10 NOTE — ANESTHESIA POSTPROCEDURE EVALUATION
Department of Anesthesiology  Postprocedure Note    Patient: Mary Ellen Mohr  MRN: [id number]  YOB: 1985  Date of evaluation: 10/10/2024    Procedure Summary       Date: 10/10/24 Room / Location: [facility] ENDO 02 / [facility] ENDOSCOPY    Anesthesia Start: 0904 Anesthesia Stop: 0940    Procedures:       ESOPHAGOGASTRODUODENOSCOPY AND COLONOSCOPY      COLONOSCOPY      COLONOSCOPY POLYPECTOMY SNARE/BIOPSY Diagnosis:       Epigastric pain      Rectal bleeding      Rectal adenoma      First degree hemorrhoids      Diverticulosis of colon (without mention of hemorrhage)      (Epigastric pain [R10.13])      (Rectal bleeding [K62.5])    Surgeons: Scooter Angel MD Responsible Provider: Adilson Fung MD    Anesthesia Type: MAC ASA Status: 2            Anesthesia Type: MAC    Christiano Phase I: Christiano Score: 10    Christiano Phase II: Christiano Score: 10    Anesthesia Post Evaluation    Patient location during evaluation: PACU  Patient participation: complete - patient participated  Level of consciousness: awake and alert  Airway patency: patent  Nausea & Vomiting: no nausea  Cardiovascular status: hemodynamically stable  Respiratory status: acceptable  Hydration status: euvolemic    No notable events documented.

## 2024-10-10 NOTE — OP NOTE
NAME:  Mary Ellen Mohr   :   1985   MRN:   [id number]     Date/Time:  10/10/2024 9:48 AM    Esophagogastroduodenoscopy (EGD) Procedure Note    Procedure: Esophagogastroduodenoscopy with biopsy    Indication: Abdominal pain, epigastric  Pre-operative Diagnosis: see indication above  Post-operative Diagnosis: see findings below  :  Scooter Angel MD  Referring Provider:   -No primary care provider on file.    Exam:  Airway: clear, no airway problems anticipated  Heart: RRR, without gallops or rubs  Lungs: clear bilaterally without wheezes, crackles, or rhonchi  Abdomen: soft, nontender, nondistended, bowel sounds present  Mental Status: awake, alert and oriented to person, place and time     Anethesia/Sedation:  MAC anesthesia Propofol as per colonoscopy  Procedure Details   After informed consent was obtained for the procedure, with all risks and benefits of procedure explained the patient was taken to the endoscopy suite and placed in the left lateral decubitus position.  Following sequential administration of sedation as per above, the [id number] gastroscope was inserted into the mouth and advanced under direct vision to second portion of the duodenum.  A careful inspection was made as the gastroscope was withdrawn, including a retroflexed view of the proximal stomach; findings and interventions are described below.                  Findings:    -Normal esophagus; biopsied to exclude inflammation  -Normal stomach; biopsied to exclude inflammation  -Normal duodenum    Therapies:  biopsy of esophagus; biopsy of stomach   Specimens: #1 gastric; #2 gastroesophageal junction  EBL:  None.         Complications:   None; patient tolerated the procedure well.           Impression:    -Normal esophagus; biopsied to exclude inflammation  -Normal stomach; biopsied to exclude inflammation  -Normal duodenum    Recommendations:  -Await pathology., -Follow symptoms., -Proceed to colonoscopy    Discharge disposition:

## 2024-10-10 NOTE — PROGRESS NOTES
Endoscope was pre-cleaned at bedside immediately following procedure by Gareth gonsalves transported to recovery with patient

## 2024-10-10 NOTE — PERIOP NOTE
10/10/2024        RE: Mary Ellen Mohr         [address] [facility] [address] [facility] [address]          To Whom It May Concern,      Due to medical reasons, Mary Ellen Mohr   may return to work on 10/11/2024        Sincerely,          WM HAMMOND, RN

## 2024-10-10 NOTE — PROGRESS NOTES
ARRIVAL INFORMATION:  Verified patient name and date of birth, scheduled procedure, and informed consent.     : Trupti alvarez   contact number: [phone]  Physician and staff can share information with the .     Receive texts: yes    Belongings with patient include:  Clothing,Jewelry, patient stated that body jewelry was removed in bathroom and put in patient belonging bag, nose ring left in, personal blanket cell left with daughter     GI FOCUSED ASSESSMENT:  Neuro: Awake, alert, oriented x4  Respiratory: even and unlabored   GI: soft and non-distended  EKG Rhythm: normal sinus rhythm    The risks and benefits of the bite block have been explained to patient.  Patient verbalizes understanding.     Education:Reviewed general discharge instructions and  information.

## 2024-10-10 NOTE — ANESTHESIA PRE PROCEDURE
Department of Anesthesiology  Preprocedure Note       Name:  Mary Ellen Mohr   Age:  38 y.o.  :  1985                                          MRN:  [id number]         Date:  10/10/2024      Surgeon: Surgeon(s):  Scooter Angel MD    Procedure: Procedure(s):  ESOPHAGOGASTRODUODENOSCOPY AND COLONOSCOPY  COLONOSCOPY    Medications prior to admission:   Prior to Admission medications    Medication Sig Start Date End Date Taking? Authorizing Provider   loratadine (CLARITIN) 10 MG tablet Take 1 tablet by mouth daily 24   Heydi Valdes PA-C   fluticasone (FLONASE) 50 MCG/ACT nasal spray 1 spray by Each Nostril route daily 24   Heydi Valdes PA-C   butalbital-acetaminophen-caffeine (FIORICET, ESGIC) -40 MG per tablet Take 1 tablet by mouth every 6 hours as needed for Headaches 24   Fabian Lopez MD   cyclobenzaprine (FLEXERIL) 10 MG tablet TAKE 1 TABLET BY MOUTH THREE TIMES A DAY AS NEEDED 23   Abrahan Glez PA-C   diclofenac (VOLTAREN) 75 MG EC tablet Take 1 tablet by mouth 2 times daily as needed for Pain 23   Abrahan Glez PA-C   lidocaine 4 % external patch Apply one patch every 12 hours as needed for pain 23   Abrahan Glez PA-C   miconazole (MICOTIN) 2 % cream Apply topically 2 times daily as needed (vainal itching) Apply topically 2 times daily. 23   Abrahan Glez PA-C   naproxen (NAPROSYN) 250 MG tablet Take 1 tablet by mouth 2 times daily for 7 days 23  Farheen Marrufo PA   acetaminophen (TYLENOL) 650 MG extended release tablet Take 1 tablet by mouth in the morning and 1 tablet at noon and 1 tablet in the evening. 23   Automatic Reconciliation, Ar   budesonide-formoterol (SYMBICORT) 80-4.5 MCG/ACT AERO TAKE 1 PUFF BY MOUTH TWICE A DAY 23   Automatic Reconciliation, Ar   cetirizine-psuedoephedrine (ZYRTEC-D) 5-120 MG per extended release tablet Take 1 tablet by mouth 2 times daily 23   Automatic

## 2024-10-15 ENCOUNTER — HOSPITAL ENCOUNTER (EMERGENCY)
Facility: HOSPITAL | Age: 39
Discharge: HOME | End: 2024-10-15
Payer: MEDICAID

## 2024-10-15 VITALS
HEART RATE: 84 BPM | RESPIRATION RATE: 16 BRPM | BODY MASS INDEX: 29.4 KG/M2 | WEIGHT: 193.34 LBS | TEMPERATURE: 98.2 F | SYSTOLIC BLOOD PRESSURE: 148 MMHG | OXYGEN SATURATION: 100 % | DIASTOLIC BLOOD PRESSURE: 83 MMHG

## 2024-10-15 DIAGNOSIS — M54.50 ACUTE EXACERBATION OF CHRONIC LOW BACK PAIN: Primary | ICD-10-CM

## 2024-10-15 DIAGNOSIS — G89.29 ACUTE EXACERBATION OF CHRONIC LOW BACK PAIN: Primary | ICD-10-CM

## 2024-10-15 PROCEDURE — 96372 THER/PROPH/DIAG INJ SC/IM: CPT

## 2024-10-15 PROCEDURE — 6370000000 HC RX 637 (ALT 250 FOR IP)

## 2024-10-15 PROCEDURE — 99283 EMERGENCY DEPT VISIT LOW MDM: CPT

## 2024-10-15 PROCEDURE — 6360000002 HC RX W HCPCS

## 2024-10-15 RX ORDER — HYDROCODONE BITARTRATE AND ACETAMINOPHEN 5; 325 MG/1; MG/1
1 TABLET ORAL
Status: COMPLETED | OUTPATIENT
Start: 2024-10-15 | End: 2024-10-15

## 2024-10-15 RX ORDER — METHOCARBAMOL 750 MG/1
750 TABLET, FILM COATED ORAL
Status: COMPLETED | OUTPATIENT
Start: 2024-10-15 | End: 2024-10-15

## 2024-10-15 RX ORDER — METHOCARBAMOL 750 MG/1
750 TABLET, FILM COATED ORAL EVERY 6 HOURS PRN
Qty: 20 TABLET | Refills: 0 | Status: SHIPPED | OUTPATIENT
Start: 2024-10-15 | End: 2024-10-25

## 2024-10-15 RX ORDER — PREDNISONE 10 MG/1
TABLET ORAL
Qty: 21 EACH | Refills: 0 | Status: SHIPPED | OUTPATIENT
Start: 2024-10-15

## 2024-10-15 RX ORDER — KETOROLAC TROMETHAMINE 30 MG/ML
15 INJECTION, SOLUTION INTRAMUSCULAR; INTRAVENOUS ONCE
Status: DISCONTINUED | OUTPATIENT
Start: 2024-10-15 | End: 2024-10-15

## 2024-10-15 RX ORDER — PREDNISONE 20 MG/1
60 TABLET ORAL
Status: COMPLETED | OUTPATIENT
Start: 2024-10-15 | End: 2024-10-15

## 2024-10-15 RX ORDER — KETOROLAC TROMETHAMINE 30 MG/ML
15 INJECTION, SOLUTION INTRAMUSCULAR; INTRAVENOUS ONCE
Status: COMPLETED | OUTPATIENT
Start: 2024-10-15 | End: 2024-10-15

## 2024-10-15 RX ADMIN — KETOROLAC TROMETHAMINE 15 MG: 30 INJECTION, SOLUTION INTRAMUSCULAR at 13:40

## 2024-10-15 RX ADMIN — METHOCARBAMOL TABLETS 750 MG: 750 TABLET, COATED ORAL at 13:41

## 2024-10-15 RX ADMIN — HYDROCODONE BITARTRATE AND ACETAMINOPHEN 1 TABLET: 5; 325 TABLET ORAL at 13:41

## 2024-10-15 RX ADMIN — PREDNISONE 60 MG: 20 TABLET ORAL at 13:41

## 2024-10-15 ASSESSMENT — PAIN SCALES - GENERAL
PAINLEVEL_OUTOF10: 10

## 2024-10-15 NOTE — ED PROVIDER NOTES
[facility] EMERGENCY DEPT  EMERGENCY DEPARTMENT ENCOUNTER       Pt Name: Mary Ellen Mohr  MRN: [id number]  Birthdate 1985  Date of evaluation: 10/15/2024  Provider: Marialuisa Christensen PA-C   PCP: No primary care provider on file.  Note Started: 1:04 PM EDT 10/15/24     CHIEF COMPLAINT       Chief Complaint   Patient presents with    Back Pain     History of arthritis and bulgin disk, acute flair x 3 days, pt ambulatory to triage, denies injury/incontinence        HISTORY OF PRESENT ILLNESS: 1 or more elements      History From: Patient  HPI Limitations: None     Mary Ellen Mohr is a 38 y.o. female who presents complaining of acute on chronic exacerbation of her low back pain.  Patient reports that her low back pain has been acting up for the last 3 days.  She does have a history of chronic back pain for which she was previously seeing orthopedic specialist and had been referred to pain management.  She reports that she does not yet have an appointment with pain management.  She reports that her primary care is prescribing her tramadol and gabapentin however she did run out of these.  She does have an appointment next week with her primary care physician.  She complains of a sharp aching-like pain in her lower back that is similar to her previous back pain.  She denies any radiation pain down her extremities, extremity numbness, extremities, extremity tingling.  She denies any saddle anesthesia, bowel/bladder incontinence.  She does report she has had to come to the emergency room before for exacerbation of back pain, and received a Toradol shot which typically provides relief.  She denies any fever, chills, nausea, vomiting, dysuria, hematuria, urinary frequency, urinary urgency, hesitancy, radiation of pain to her abdomen.      Nursing Notes were all reviewed and agreed with or any disagreements were addressed in the HPI.     REVIEW OF SYSTEMS      Review of Systems     Positives and Pertinent negatives as per

## 2025-08-07 ENCOUNTER — HOSPITAL ENCOUNTER (EMERGENCY)
Facility: HOSPITAL | Age: 40
Discharge: HOME | End: 2025-08-07
Payer: MEDICAID

## 2025-08-07 VITALS
HEART RATE: 78 BPM | OXYGEN SATURATION: 100 % | TEMPERATURE: 98.1 F | HEIGHT: 68 IN | RESPIRATION RATE: 16 BRPM | DIASTOLIC BLOOD PRESSURE: 96 MMHG | BODY MASS INDEX: 29.25 KG/M2 | WEIGHT: 193 LBS | SYSTOLIC BLOOD PRESSURE: 134 MMHG

## 2025-08-07 DIAGNOSIS — G89.29 ACUTE EXACERBATION OF CHRONIC LOW BACK PAIN: Primary | ICD-10-CM

## 2025-08-07 DIAGNOSIS — M54.50 ACUTE EXACERBATION OF CHRONIC LOW BACK PAIN: Primary | ICD-10-CM

## 2025-08-07 PROCEDURE — 99283 EMERGENCY DEPT VISIT LOW MDM: CPT

## 2025-08-07 RX ORDER — PREDNISONE 20 MG/1
40 TABLET ORAL DAILY
Qty: 10 TABLET | Refills: 0 | Status: SHIPPED | OUTPATIENT
Start: 2025-08-07 | End: 2025-08-12

## 2025-08-07 RX ORDER — DICLOFENAC SODIUM 75 MG/1
75 TABLET, DELAYED RELEASE ORAL 2 TIMES DAILY PRN
Qty: 20 TABLET | Refills: 0 | Status: SHIPPED | OUTPATIENT
Start: 2025-08-07

## 2025-08-07 RX ORDER — OXYCODONE HYDROCHLORIDE 5 MG/1
5 TABLET ORAL EVERY 8 HOURS PRN
Qty: 6 TABLET | Refills: 0 | Status: SHIPPED | OUTPATIENT
Start: 2025-08-07 | End: 2025-08-09

## 2025-08-07 ASSESSMENT — LIFESTYLE VARIABLES
HOW MANY STANDARD DRINKS CONTAINING ALCOHOL DO YOU HAVE ON A TYPICAL DAY: PATIENT DOES NOT DRINK
HOW OFTEN DO YOU HAVE A DRINK CONTAINING ALCOHOL: NEVER

## 2025-08-07 ASSESSMENT — PAIN DESCRIPTION - LOCATION: LOCATION: LEG

## 2025-08-07 ASSESSMENT — PAIN - FUNCTIONAL ASSESSMENT: PAIN_FUNCTIONAL_ASSESSMENT: 0-10

## 2025-08-07 ASSESSMENT — PAIN SCALES - GENERAL: PAINLEVEL_OUTOF10: 8

## 2025-08-07 ASSESSMENT — PAIN DESCRIPTION - ORIENTATION: ORIENTATION: POSTERIOR

## (undated) DEVICE — CONTAINER SPEC 20 ML LID NEUT BUFF FORMALIN 10 % POLYPR STS

## (undated) DEVICE — TRAP ENDOSCP POLYP 2 CHMBR DRAWER TYP

## (undated) DEVICE — Device: Brand: JELCO

## (undated) DEVICE — IV START KIT: Brand: MEDLINE

## (undated) DEVICE — TOWEL OR BL STR 4/PK -- MEDICHOICE - MEDLINE

## (undated) DEVICE — POLYLINED TOWEL: Brand: CONVERTORS

## (undated) DEVICE — SET EXTN PRIMING 0.59ML 8.5IN 1.55LB PRSS RATE MINIBOR PUR

## (undated) DEVICE — ENDOSCOPIC KIT COMPLIANCE ENDOKIT

## (undated) DEVICE — COVIDIEN KENDALL DL DISPOSABLE 3 LEAD SY: Brand: MEDLINE RENEWAL

## (undated) DEVICE — ADULT SPO2 SENSOR: Brand: NELLCOR

## (undated) DEVICE — CATHETER IV 20GA L1.16IN OD1.0414-1.1176MM ID0.762-0.8382MM

## (undated) DEVICE — PREP SKN CHLRAPRP APL 26ML STR --

## (undated) DEVICE — ELECTRODE PT RET AD L9FT HI MOIST COND ADH HYDRGEL CORDED

## (undated) DEVICE — FORCEP BX JUMBO 4 2.8 MMX240 CM 3.2 MM OVL CUP RADIAL JAW

## (undated) DEVICE — GLOVE ORANGE PI 8   MSG9080

## (undated) DEVICE — SNARE ENDOSCP POLYP MED STD AD 2.4X27X240 CM 2.8 MM OVL SENS

## (undated) DEVICE — SYRINGE MED 10ML LUERLOCK TIP W/O SFTY DISP

## (undated) DEVICE — HYPODERMIC SAFETY NEEDLE: Brand: MONOJECT

## (undated) DEVICE — SYRINGE MED 5ML STD CLR PLAS LUERLOCK TIP N CTRL DISP

## (undated) DEVICE — TIP SUCT TRNSPAR RIB SURF STD BLB RIG NVENT W/ 5IN1 CONN DYND50138] MEDLINE INDUSTRIES INC]

## (undated) DEVICE — MARKER,SKIN,WI/RULER AND LABELS: Brand: MEDLINE

## (undated) DEVICE — SET GRAV CK VLV NEEDLESS ST 3 GANGED 4WAY STPCOCK HI FLO 10

## (undated) DEVICE — NEEDLE SPNL L4.75IN OD25GA QNCKE TYP SPINOCAN

## (undated) DEVICE — CUFF BLD PRSS AD CLTH SGL TB W/ BAYNT CONN ROUNDED CORNER

## (undated) DEVICE — ADULT RESUS BAG MANO PEEP FILT ADLT MASK: Brand: MEDLINE